# Patient Record
Sex: MALE | Race: BLACK OR AFRICAN AMERICAN | NOT HISPANIC OR LATINO | Employment: FULL TIME | URBAN - METROPOLITAN AREA
[De-identification: names, ages, dates, MRNs, and addresses within clinical notes are randomized per-mention and may not be internally consistent; named-entity substitution may affect disease eponyms.]

---

## 2017-01-25 ENCOUNTER — ALLSCRIPTS OFFICE VISIT (OUTPATIENT)
Dept: OTHER | Facility: OTHER | Age: 42
End: 2017-01-25

## 2017-04-05 ENCOUNTER — ALLSCRIPTS OFFICE VISIT (OUTPATIENT)
Dept: OTHER | Facility: OTHER | Age: 42
End: 2017-04-05

## 2017-04-05 DIAGNOSIS — Z13.6 ENCOUNTER FOR SCREENING FOR CARDIOVASCULAR DISORDERS: ICD-10-CM

## 2017-04-05 DIAGNOSIS — Z00.00 ENCOUNTER FOR GENERAL ADULT MEDICAL EXAMINATION WITHOUT ABNORMAL FINDINGS: ICD-10-CM

## 2017-04-08 LAB
A/G RATIO (HISTORICAL): 1.7 (CALC) (ref 1–2.5)
ALBUMIN SERPL BCP-MCNC: 4 G/DL (ref 3.6–5.1)
ALP SERPL-CCNC: 58 U/L (ref 40–115)
ALT SERPL W P-5'-P-CCNC: 40 U/L (ref 9–46)
AST SERPL W P-5'-P-CCNC: 27 U/L (ref 10–40)
BILIRUB SERPL-MCNC: 0.5 MG/DL (ref 0.2–1.2)
BUN SERPL-MCNC: 9 MG/DL (ref 7–25)
BUN/CREA RATIO (HISTORICAL): NORMAL (CALC) (ref 6–22)
CALCIUM SERPL-MCNC: 8.9 MG/DL (ref 8.6–10.3)
CHLORIDE SERPL-SCNC: 104 MMOL/L (ref 98–110)
CHOLEST SERPL-MCNC: 121 MG/DL (ref 125–200)
CHOLEST/HDLC SERPL: 3 (CALC)
CO2 SERPL-SCNC: 31 MMOL/L (ref 20–31)
CREAT SERPL-MCNC: 0.97 MG/DL (ref 0.6–1.35)
EGFR AFRICAN AMERICAN (HISTORICAL): 113 ML/MIN/1.73M2
EGFR-AMERICAN CALC (HISTORICAL): 98 ML/MIN/1.73M2
GAMMA GLOBULIN (HISTORICAL): 2.4 G/DL (CALC) (ref 1.9–3.7)
GLUCOSE (HISTORICAL): 94 MG/DL (ref 65–99)
HDLC SERPL-MCNC: 40 MG/DL
LDL CHOLESTEROL (HISTORICAL): 62 MG/DL (CALC)
NON-HDL-CHOL (CHOL-HDL) (HISTORICAL): 81 MG/DL (CALC)
POTASSIUM SERPL-SCNC: 4 MMOL/L (ref 3.5–5.3)
SODIUM SERPL-SCNC: 140 MMOL/L (ref 135–146)
TOTAL PROTEIN (HISTORICAL): 6.4 G/DL (ref 6.1–8.1)
TRIGL SERPL-MCNC: 95 MG/DL

## 2017-04-28 ENCOUNTER — ALLSCRIPTS OFFICE VISIT (OUTPATIENT)
Dept: OTHER | Facility: OTHER | Age: 42
End: 2017-04-28

## 2017-05-07 ENCOUNTER — GENERIC CONVERSION - ENCOUNTER (OUTPATIENT)
Dept: OTHER | Facility: OTHER | Age: 42
End: 2017-05-07

## 2017-05-16 LAB
DEPRECATED RDW RBC AUTO: 13.6 % (ref 11–15)
HCT VFR BLD AUTO: 45.9 % (ref 38.5–50)
HGB BLD-MCNC: 15.2 G/DL (ref 13.2–17.1)
MCH RBC QN AUTO: 28.4 PG (ref 27–33)
MCHC RBC AUTO-ENTMCNC: 33.2 G/DL (ref 32–36)
MCV RBC AUTO: 85.5 FL (ref 80–100)
PLATELET # BLD AUTO: 158 THOUSAND/UL (ref 140–400)
PMV BLD AUTO: 10.4 FL (ref 7.5–12.5)
RBC # BLD AUTO: 5.37 MILLION/UL (ref 4.2–5.8)
WBC # BLD AUTO: 3.4 THOUSAND/UL (ref 3.8–10.8)

## 2017-10-20 ENCOUNTER — ALLSCRIPTS OFFICE VISIT (OUTPATIENT)
Dept: OTHER | Facility: OTHER | Age: 42
End: 2017-10-20

## 2017-10-20 ENCOUNTER — GENERIC CONVERSION - ENCOUNTER (OUTPATIENT)
Dept: OTHER | Facility: OTHER | Age: 42
End: 2017-10-20

## 2017-10-20 ENCOUNTER — APPOINTMENT (OUTPATIENT)
Dept: LAB | Facility: HOSPITAL | Age: 42
End: 2017-10-20
Payer: COMMERCIAL

## 2017-10-20 ENCOUNTER — TRANSCRIBE ORDERS (OUTPATIENT)
Dept: ADMINISTRATIVE | Facility: HOSPITAL | Age: 42
End: 2017-10-20

## 2017-10-20 DIAGNOSIS — R39.14 FEELING OF INCOMPLETE BLADDER EMPTYING: Primary | ICD-10-CM

## 2017-10-20 DIAGNOSIS — R39.14 FEELING OF INCOMPLETE BLADDER EMPTYING: ICD-10-CM

## 2017-10-20 LAB
BILIRUB UR QL STRIP: 1
CLARITY UR: NORMAL
COLOR UR: YELLOW
GLUCOSE (HISTORICAL): NORMAL
HGB UR QL STRIP.AUTO: NORMAL
KETONES UR STRIP-MCNC: NORMAL MG/DL
LEUKOCYTE ESTERASE UR QL STRIP: 70
NITRITE UR QL STRIP: NORMAL
PH UR STRIP.AUTO: 5 [PH]
PROT UR STRIP-MCNC: 2000 MG/DL
SP GR UR STRIP.AUTO: 1.02
UROBILINOGEN UR QL STRIP.AUTO: 0.2

## 2017-10-20 PROCEDURE — 87086 URINE CULTURE/COLONY COUNT: CPT

## 2017-10-21 LAB — BACTERIA UR CULT: NORMAL

## 2017-10-21 NOTE — PROGRESS NOTES
Assessment  1  Feeling of incomplete bladder emptying (668 21) (R39 14)    Plan  Dysuria    · Urine Dip Non-Automated- POC; Status:Resulted - Requires Verification,Retrospective  By Protocol Authorization;   Done: 25GKU3684 09:50AM  Feeling of incomplete bladder emptying    · (1) URINE CULTURE; Source:Urine, Clean Catch; Status:Active; Requested  INL:[id number];    · US KIDNEY AND BLADDER WITH PVR; Status:Active; Requested PXX:[id number];   Flu vaccine need    · Fluzone Quadrivalent Intramuscular Suspension    Discussion/Summary    Incomplete bladder emptying- we will do bladder ultrasound  Urine sent for culture due to positive leukocytes  I will wait until culture results to prescribe any antibiotics  better family History on prostate cancer  The treatment plan was reviewed with the patient/guardian  The patient/guardian understands and agrees with the treatment plan      Chief Complaint  Pt here to discuss prostate exam       History of Present Illness  patient states he been having issues with bladder emptying  Has been feeling like volumes are decreased  This has been going on for a few months  Has some dribbling but no urgency, has decreased stream as it has been prior  Denies any burning or irritation  No blood has been present in urine  No ED concerns or libido concerns  Denies any immediate family history of prostate cancer  does do protein supplements up to twice a day for working out  also competed hep b series at work  Review of Systems    Constitutional: No fever or chills, feels well, no tiredness, no recent weight gain or weight loss  Cardiovascular: no chest pain  Respiratory: no shortness of breath  Gastrointestinal: no abdominal pain  Genitourinary: as noted in HPI  Musculoskeletal: no myalgias  ROS reviewed  Active Problems  1  Acquired trigger finger (727 03) (M65 30)   2  Bilateral impacted cerumen (380 4) (H61 23)   3   Encounter for screening for cardiovascular disorders (V81 2) (Z13 6)   4  Flu vaccine need (V04 81) (Z23)   5  Low back pain (724 2) (M54 5)   6  Mild persistent asthma without complication (551 23) (X89 27)   7  Need for Tdap vaccination (V06 1) (Z23)   8  Pain in finger of both hands (729 5) (M79 645,M79 644)   9  Snoring (786 09) (R06 83)   10  Suppurative otitis media of left ear, unspecified chronicity (382 4) (H66 42)    Past Medical History  1  Denied: History of alcohol abuse   2  Denied: History of mental disorder   3  Denied: History of substance abuse    The active problems and past medical history were reviewed and updated today  Surgical History  1  History of Rotator Cuff Repair    The surgical history was reviewed and updated today  Family History  Father    1  Family history of Diabetes (250 00) (E11 9)  Grandmother    2  Family history of Diabetes (250 00) (E11 9)  Family History    3  Denied: Family history of alcohol abuse   4  Denied: Family history of mental disorder   5  Denied: Family history of substance abuse    The family history was reviewed and updated today  Social History   · Active advance directive (V49 89) (Z78 9)   · Always uses seat belt   · Mandaeism   · Employed   · Has no children   · House   · Lives in private house   · Lives with domestic partner   · Never a smoker   · No advance directives (V49 89) (Z78 9)   · No alcohol use   · No history of alcohol use (Z78 9)   · No living will   · Primary language is English   · Single   · Supportive and safe   · [employer]  The social history was reviewed and updated today  Current Meds   1  Albuterol Sulfate (2 5 MG/3ML) 0 083% Inhalation Nebulization Solution; USE 1 VIAL IN   NEBULIZER EVERY 4 HOURS AS NEEDED; Therapy: 15Apr2016 to (Rehannohelia Pereira)  Requested for: 05Apr2017; Last   Rx:05Apr2017 Ordered   2  Cyclobenzaprine HCl - 10 MG Oral Tablet; TAKE 1 TABLET AT BEDTIME AS NEEDED;    Therapy: 27Apr2015 to (Evaluate:07Jxe9706)  Requested for: 05UWY3999; Last   Rx:28Apr2017 Ordered   3  Dulera 100-5 MCG/ACT Inhalation Aerosol; INHALE 2 PUFFS AT 12 HOUR INTERVALS   (MORNING AND EVENING); Therapy: 25ITN2615 to (Evaluate:39Xry6229)  Requested for: 05Apr2017; Last   Rx:05Apr2017 Ordered   4  Proventil  (90 Base) MCG/ACT Inhalation Aerosol Solution; INHALE 2 PUFFS   EVERY 4-6 HOURS, SPACED 60 SECONDS APART; Therapy: 91Wcl1868 to [phone])  Requested for: 320 2035; Last   Rx:05Apr2017 Ordered    The medication list was reviewed and updated today  Allergies  1  No Known Drug Allergies    Vitals  Vital Signs    Recorded: 37MKU0531 09:11AM   Temperature 97 7 F, Temporal   Heart Rate 66   Respiration 16   Systolic 627   Diastolic 510   Height 5 ft 2 in   Weight 152 lb 8 oz   BMI Calculated 27 89   BSA Calculated 1 7     Physical Exam    Constitutional   General appearance: No acute distress, well appearing and well nourished  Pulmonary   Respiratory effort: No increased work of breathing or signs of respiratory distress  Auscultation of lungs: Clear to auscultation, equal breath sounds bilaterally, no wheezes, no rales, no rhonci  Cardiovascular   Auscultation of heart: Normal rate and rhythm, normal S1 and S2, without murmurs  Abdomen   Abdomen: Non-tender, no masses    no CVA tenderness   Psychiatric   Orientation to person, place and time: Normal     Mood and affect: Normal          Results/Data  Urine Dip Non-Automated- POC 20Oct2017 09:50AM Noman Sagastume     Test Name Result Flag Reference   Color Yellow     Clarity Transparent     Leukocytes 70     Nitrite neg     Blood neg     Bilirubin 1     Urobilinogen 0 2     Protein 2000     Ph 5     Specific Gravity 1 025     Ketone ++++     Glucose neg     Color Yellow     Clarity Transparent     Leukocytes 70     Nitrite neg     Blood neg     Bilirubin 1     Urobilinogen 0 2     Protein 2000     Ph 5     Specific Gravity 1 025     Ketone ++++     Glucose neg Signatures   Electronically signed by : Melody Espino; Oct 20 2017 10:02AM EST                       (Author)    Electronically signed by :  Kirill Maria MD; Oct 20 2017 10:07AM EST                       (Author)

## 2017-10-23 ENCOUNTER — HOSPITAL ENCOUNTER (OUTPATIENT)
Dept: RADIOLOGY | Facility: HOSPITAL | Age: 42
Discharge: HOME | End: 2017-10-23
Payer: COMMERCIAL

## 2017-10-23 ENCOUNTER — GENERIC CONVERSION - ENCOUNTER (OUTPATIENT)
Dept: OTHER | Facility: OTHER | Age: 42
End: 2017-10-23

## 2017-10-23 DIAGNOSIS — R39.14 FEELING OF INCOMPLETE BLADDER EMPTYING: ICD-10-CM

## 2017-10-23 PROCEDURE — 51798 US URINE CAPACITY MEASURE: CPT

## 2018-01-10 NOTE — RESULT NOTES
Verified Results  (1) COMPREHENSIVE METABOLIC PANEL 09DPB3654 59:93WN Marysol Patricior   REPORT COMMENT:  FASTING:YES     Test Name Result Flag Reference   GLUCOSE 94 mg/dL  65-99   Fasting reference interval   UREA NITROGEN (BUN) 9 mg/dL  7-25   CREATININE 0 97 mg/dL  0 60-1 35   eGFR NON-AFR  AMERICAN 98 mL/min/1 73m2  > OR = 60   eGFR AFRICAN AMERICAN 113 mL/min/1 73m2  > OR = 60   BUN/CREATININE RATIO   4-46   NOT APPLICABLE (calc)   SODIUM 140 mmol/L  135-146   POTASSIUM 4 0 mmol/L  3 5-5 3   CHLORIDE 104 mmol/L     CARBON DIOXIDE 31 mmol/L  20-31   CALCIUM 8 9 mg/dL  8 6-10 3   PROTEIN, TOTAL 6 4 g/dL  6 1-8 1   ALBUMIN 4 0 g/dL  3 6-5 1   GLOBULIN 2 4 g/dL (calc)  1 9-3 7   ALBUMIN/GLOBULIN RATIO 1 7 (calc)  1 0-2 5   BILIRUBIN, TOTAL 0 5 mg/dL  0 2-1 2   ALKALINE PHOSPHATASE 58 U/L     AST 27 U/L  10-40   ALT 40 U/L  9-46     (1) LIPID PANEL, FASTING 07Apr2017 08:12AM Marysol Patricior     Test Name Result Flag Reference   CHOLESTEROL, TOTAL 121 mg/dL L 125-200   HDL CHOLESTEROL 40 mg/dL  > OR = 40   TRIGLICERIDES 95 mg/dL  <184   LDL-CHOLESTEROL 62 mg/dL (calc)  <130   Desirable range <100 mg/dL for patients with CHD or  diabetes and <70 mg/dL for diabetic patients with  known heart disease  CHOL/HDLC RATIO 3 0 (calc)  < OR = 5 0   NON HDL CHOLESTEROL 81 mg/dL (calc)     Target for non-HDL cholesterol is 30 mg/dL higher than   LDL cholesterol target

## 2018-01-12 VITALS
SYSTOLIC BLOOD PRESSURE: 140 MMHG | WEIGHT: 152.5 LBS | HEIGHT: 62 IN | HEART RATE: 66 BPM | BODY MASS INDEX: 28.06 KG/M2 | DIASTOLIC BLOOD PRESSURE: 102 MMHG | TEMPERATURE: 97.7 F | RESPIRATION RATE: 16 BRPM

## 2018-01-12 NOTE — RESULT NOTES
Verified Results  (1) URINE CULTURE 12QSD0114 05:52PM Luis Eduardo Cesar Order Number: [id number]     Test Name Result Flag Reference   CLINICAL REPORT (Report)     Test:        Urine culture  Specimen Type:   Urine  Specimen Date:   10/20/2017 5:52 PM  Result Date:    10/21/2017 3:30 PM  Result Status:   Final result  Resulting Lab:   [place] [address]            Tel: [phone]      CULTURE                                       ------------------                                   No Growth <1000 cfu/mL       Signatures   Electronically signed by : Alena Michael; Oct 23 2017 11:24AM EST                       (Author)

## 2018-01-13 VITALS
BODY MASS INDEX: 28.52 KG/M2 | TEMPERATURE: 99.7 F | DIASTOLIC BLOOD PRESSURE: 96 MMHG | HEART RATE: 80 BPM | WEIGHT: 155 LBS | SYSTOLIC BLOOD PRESSURE: 122 MMHG | RESPIRATION RATE: 16 BRPM | HEIGHT: 62 IN

## 2018-01-13 VITALS
HEIGHT: 62 IN | BODY MASS INDEX: 28.52 KG/M2 | SYSTOLIC BLOOD PRESSURE: 120 MMHG | WEIGHT: 155 LBS | DIASTOLIC BLOOD PRESSURE: 82 MMHG | HEART RATE: 78 BPM | TEMPERATURE: 99.3 F

## 2018-01-15 NOTE — PROGRESS NOTES
Assessment    1  Encounter for preventive health examination (V70 0) (Z00 00)    Plan  Encounter for screening for cardiovascular disorders    · (1) LIPID PANEL, FASTING; Status:Resulted - Requires Verification;   Done: 09Tga9739  08:12AM  Health Maintenance    · (1) COMPREHENSIVE METABOLIC PANEL; Status:Resulted - Requires Verification;    Done: 69ACV5143 08:12AM  Mild persistent asthma without complication    · Albuterol Sulfate (2 5 MG/3ML) 0 083% Inhalation Nebulization Solution; USE 1  VIAL IN NEBULIZER EVERY 4 HOURS AS NEEDED   · Dulera 100-5 MCG/ACT Inhalation Aerosol; INHALE 2 PUFFS AT 12 HOUR  INTERVALS (MORNING AND EVENING)   · Proventil  (90 Base) MCG/ACT Inhalation Aerosol Solution; INHALE 2  PUFFS EVERY 4-6 HOURS, SPACED 60 SECONDS APART    Discussion/Summary  Impression: health maintenance visit  Currently, he eats a healthy diet and has an adequate exercise regimen  Prostate cancer screening: PSA is not indicated  Colorectal cancer screening: colorectal cancer screening is not indicated  Patient discussion: discussed with the patient  Chief Complaint  yearly work physical      History of Present Illness  , Adult Male: The patient is being seen for a health maintenance evaluation  General Health: The patient's health since the last visit is described as good  He has regular dental visits  He denies vision problems  He denies hearing loss  Immunizations status: not up to date  Lifestyle:  He consumes a diverse and healthy diet  He does not have any weight concerns  He exercises regularly  He does not use tobacco  He denies alcohol use  Screening: cancer screening reviewed and updated  metabolic screening reviewed and updated  risk screening reviewed and updated  Review of Systems    Constitutional: No fever or chills, feels well, no tiredness, no recent weight gain or weight loss  Eyes: No complaints of eye pain, no red eyes, no discharge from eyes, no itchy eyes     ENT: no complaints of earache, no hearing loss, no nosebleeds, no nasal discharge, no sore throat, no hoarseness  Cardiovascular: No complaints of slow heart rate, no fast heart rate, no chest pain, no palpitations, no leg claudication, no lower extremity  Respiratory: No complaints of shortness of breath, no wheezing, no cough, no SOB on exertion, no orthopnea or PND  Gastrointestinal: No complaints of abdominal pain, no constipation, no nausea or vomiting, no diarrhea or bloody stools  Genitourinary: No complaints of dysuria, no incontinence, no hesitancy, no nocturia, no genital lesion, no testicular pain  Musculoskeletal: No complaints of arthralgia, no myalgias, no joint swelling or stiffness, no limb pain or swelling  Integumentary: No complaints of skin rash or skin lesions, no itching, no skin wound, no dry skin  Neurological: No compliants of headache, no confusion, no convulsions, no numbness or tingling, no dizziness or fainting, no limb weakness, no difficulty walking  Psychiatric: Is not suicidal, no sleep disturbances, no anxiety or depression, no change in personality, no emotional problems  Endocrine: No complaints of proptosis, no hot flashes, no muscle weakness, no erectile dysfunction, no deepening of the voice, no feelings of weakness  Hematologic/Lymphatic: No complaints of swollen glands, no swollen glands in the neck, does not bleed easily, no easy bruising  Active Problems    1  Acquired trigger finger (727 03) (M65 30)   2  Bilateral impacted cerumen (380 4) (H61 23)   3  Flu vaccine need (V04 81) (Z23)   4  Low back pain (724 2) (M54 5)   5  Mild persistent asthma without complication (785 36) (P35 43)   6  Need for Tdap vaccination (V06 1) (Z23)   7  Pain in finger of both hands (729 5) (M79 645,M79 644)   8  Snoring (786 09) (R06 83)   9   Suppurative otitis media of left ear, unspecified chronicity (382 4) (H66 42)    Past Medical History    · Denied: History of alcohol abuse   · Denied: History of mental disorder   · Denied: History of substance abuse    Surgical History    · History of Rotator Cuff Repair    Family History  Father    · Family history of Diabetes (250 00) (E11 9)  Grandmother    · Family history of Diabetes (250 00) (E11 9)    Social History    · Never a smoker    Current Meds   1  Albuterol Sulfate (2 5 MG/3ML) 0 083% Inhalation Nebulization Solution; USE 1 VIAL IN   NEBULIZER EVERY 4 HOURS AS NEEDED; Therapy: 57Voo3793 to (Evaluate:53Acm5305)  Requested for: 36Puo1982; Last   Rx:93Qsi2817 Ordered   2  Dulera 100-5 MCG/ACT Inhalation Aerosol; INHALE 2 PUFFS AT 12 HOUR INTERVALS   (MORNING AND EVENING); Therapy: 74MIV9556 to (Evaluate:04Jun2016)  Requested for: 33YJN8544; Last   Rx:29Pzh0772 Ordered   3  Proventil  (90 Base) MCG/ACT Inhalation Aerosol Solution; INHALE 2 PUFFS   EVERY 4-6 HOURS, SPACED 60 SECONDS APART; Therapy: 38Wig5527 to (Evaluate:93Fer3593)  Requested for: 97DLY8898; Last   Rx:37Oua2211 Ordered    Allergies    1  No Known Drug Allergies    Vitals   Recorded: 79LZC1534 11:22AM   Heart Rate 76   Systolic 780   Diastolic 76   Height 5 ft 2 in   Weight 153 lb    BMI Calculated 27 98   BSA Calculated 1 71     Physical Exam    Constitutional   General appearance: No acute distress, well appearing and well nourished  Eyes   Pupils and irises: Equal, round, reactive to light  Ears, Nose, Mouth, and Throat   Otoscopic examination: Tympanic membranes translucent with normal light reflex  Canals patent without erythema  Oropharynx: Normal with no erythema, edema, exudate or lesions  Neck   Thyroid: Normal, no thyromegaly  Pulmonary   Auscultation of lungs: Clear to auscultation  Cardiovascular   Auscultation of heart: Normal rate and rhythm, normal S1 and S2, no murmurs  Pedal pulses: 2+ bilaterally  Examination of extremities for edema and/or varicosities: Normal     Abdomen   Abdomen: Non-tender, no masses  Lymphatic   Palpation of lymph nodes in neck: No lymphadenopathy  Musculoskeletal   Gait and station: Normal     Inspection/palpation of joints, bones, and muscles: Normal     Skin   Skin and subcutaneous tissue: Normal without rashes or lesions  Neurologic   Cranial nerves: Cranial nerves 2-12 intact  Results/Data  (1) COMPREHENSIVE METABOLIC PANEL 25XIG4985 03:92GK Carmelo Up   REPORT COMMENT:  FASTING:YES     Test Name Result Flag Reference   GLUCOSE 94 mg/dL  65-99   Fasting reference interval   UREA NITROGEN (BUN) 9 mg/dL  7-25   CREATININE 0 97 mg/dL  0 60-1 35   eGFR NON-AFR  AMERICAN 98 mL/min/1 73m2  > OR = 60   eGFR AFRICAN AMERICAN 113 mL/min/1 73m2  > OR = 60   BUN/CREATININE RATIO   4-62   NOT APPLICABLE (calc)   SODIUM 140 mmol/L  135-146   POTASSIUM 4 0 mmol/L  3 5-5 3   CHLORIDE 104 mmol/L     CARBON DIOXIDE 31 mmol/L  20-31   CALCIUM 8 9 mg/dL  8 6-10 3   PROTEIN, TOTAL 6 4 g/dL  6 1-8 1   ALBUMIN 4 0 g/dL  3 6-5 1   GLOBULIN 2 4 g/dL (calc)  1 9-3 7   ALBUMIN/GLOBULIN RATIO 1 7 (calc)  1 0-2 5   BILIRUBIN, TOTAL 0 5 mg/dL  0 2-1 2   ALKALINE PHOSPHATASE 58 U/L     AST 27 U/L  10-40   ALT 40 U/L  9-46     PHQ-9 Adult Depression Screening 05Apr2017 11:25AM User, [facility]     Test Name Result Flag Reference   PHQ-9 Adult Depression Score 0     Over the last two weeks, how often have you been bothered by any of the following problems?   Little interest or pleasure in doing things: Not at all - 0  Feeling down, depressed, or hopeless: Not at all - 0  Trouble falling or staying asleep, or sleeping too much: Not at all - 0  Feeling tired or having little energy: Not at all - 0  Poor appetite or over eating: Not at all - 0  Feeling bad about yourself - or that you are a failure or have let yourself or your family down: Not at all - 0  Trouble concentrating on things, such as reading the newspaper or watching television: Not at all - 0  Moving or speaking so slowly that other people could have noticed  Or the opposite -  being so fidgety or restless that you have been moving around a lot more than usual: Not at all - 0  Thoughts that you would be better off dead, or of hurting yourself in some way: Not at all - 0   PHQ-9 Adult Depression Screening Negative     PHQ-9 Difficulty Level Not difficult at all     PHQ-9 Severity No Depression         Signatures   Electronically signed by : Winsome Simmons DO;  Apr 17 2017  8:30PM EST                       (Author)

## 2018-01-16 ENCOUNTER — ALLSCRIPTS OFFICE VISIT (OUTPATIENT)
Dept: OTHER | Facility: OTHER | Age: 43
End: 2018-01-16

## 2018-01-16 DIAGNOSIS — R80.9 PROTEINURIA: ICD-10-CM

## 2018-01-16 NOTE — RESULT NOTES
Verified Results  US KIDNEY AND BLADDER WITH PVR 23Oct2017 02:31PM Latoya Chun     Test Name Result Flag Reference   US KIDNEY AND BLADDER WITH PVR (Report)     RENAL ULTRASOUND     INDICATION: Proteinuria     COMPARISON: None  TECHNIQUE:  Ultrasound of the retroperitoneum was performed with a curvilinear transducer utilizing volumetric sweeps and still imaging techniques  FINDINGS:     KIDNEYS:   Symmetric and normal size  Right kidney: 9 7 x 6 5 cm  Normal echogenicity and contour  No suspicious masses detected  No hydronephrosis  No shadowing calculi  No perinephric fluid collections  Left kidney: 10 7 x 6 4 cm  Normal echogenicity and contour  No suspicious masses detected  No hydronephrosis  No shadowing calculi  No perinephric fluid collections  URETERS:   Nonvisualized  BLADDER:    Normally distended  No focal thickening or mass lesions  Bilateral ureteral jets detected  No significant postvoid residual urine volume         IMPRESSION:     Normal         Workstation performed: [id number]     Signed by:   Flores Richard MD   10/26/17       Signatures   Electronically signed by : Carlos Draper; Oct 30 2017 11:44AM EST                       (Author)

## 2018-01-16 NOTE — PROGRESS NOTES
Assessment   1  Mild persistent asthma without complication (292 21) (W64 72)  2  Pain in finger of both hands (729 5) (M79 645,M79 644)  3  Snoring (786 09) (R06 83)  4  Bilateral impacted cerumen (380 4) (H61 23)1      1 Amended By: Darcie Pitts; Feb 05 2016 8:38 AM EST    Plan  Bilateral impacted cerumen    · 1,2   Health Maintenance, Need for Tdap vaccination    · Administer: Adacel 5-2-15 5 LF-MCG/0 5 Intramuscular Suspension; INJECT 0 5  ML  Intramuscular; To Be Done: 10BWO12064   Low back pain    · Renew: Cyclobenzaprine HCl - 10 MG Oral Tablet; TAKE 1 TABLET AT BEDTIME AS  NEEDED1   Mild persistent asthma without complication    · Renew: Dulera 100-5 MCG/ACT Inhalation Aerosol; INHALE 2 PUFFS AT 12 HOUR  INTERVALS (MORNING AND EVENING)1    · Renew: Proventil  (90 Base) MCG/ACT Inhalation Aerosol Solution; INHALE 2  PUFFS EVERY 4-6 HOURS, SPACED 60 SECONDS APART1   Mild persistent asthma without complication, Pain in finger of both hands    · Start: PredniSONE 10 MG Oral Tablet; 4/d for  3days, 3/d for 3 days, 2/d for 3 days, 1/d for  3 days  Pain in finger of both hands    · *1 -  [facility] within [facility] (ORTHOPEDIC SURGERY ) Physician  Referral  Consult  Status: Hold For - Scheduling  Requested for:  65IHK6393  () Care Summary provided  : Yes  Snoring    · *1 - [address] Physician Referral  Consult  Status: Hold For - Scheduling   Requested for: 83FDE6609  () Care Summary provided  : Yes     1 Amended By: Darcie Pitts; Feb 05 2016 8:38 AM EST   2 Amended By: Darcie Pitts; Feb 05 2016 10:15 AM EST    Discussion/Summary    See how hands and congestion do w/prednisone taper, sleep study if no better, ortho eval if no better  Chief Complaint   1  Finger Problem  1ST FINGER, B/L HAND IS CLICKING  LEFT SIDE IS WORSE   HAS GOTTEN PROGRESSIVELY WORSE overworked out with exercise routine, iced and took motrin  SNORING IS GETTING WORSE AND NOTICED LATELY THAT WHEN HE SNEEZES, NO AIR COMES OUT OF HIS NOSE  ALSO NOTICED THAT HIS CHEST IS TIGHT IN THE MORNING AND FEELS CONGESTED  History of Present Illness  Finger Problem:   Gates Harada presents with complaints of sudden onset of frequent episodes of metacarpophalangeal joint and bilateral first digit finger problem  The symptoms resulted from a traction injury  The injury occurred at home  Symptoms are improved by ice, but not by analgesics  Symptoms are unchanged  Associated symptoms include finger stiffness  Review of Systems    Constitutional: no fever, not feeling poorly and no chills  ENT: nasal blockage sx, but no earache and no nasal discharge  Respiratory: shortness of breath, wheezing and girlfriend says snoring worse  Musculoskeletal: limb pain and finger pain  Active Problems   1  Low back pain (724 2) (M54 5)    Past Medical History  Active Problems And Past Medical History Reviewed: The active problems and past medical history were reviewed and updated today  Family History   1  Family history of Diabetes (250 00) (E11 9)   2  Family history of Diabetes (250 00) (E11 9)  Family History Reviewed: The family history was reviewed and updated today  Social History    · Never a smoker  The social history was reviewed and updated today  Surgical History   1  History of Rotator Cuff Repair  Surgical History Reviewed: The surgical history was reviewed and updated today  Current Meds  1  Cyclobenzaprine HCl - 10 MG Oral Tablet; TAKE 1 TABLET AT BEDTIME AS NEEDED; Therapy: 92EXS1497 to (Evaluate:27May2015)  Requested for: ; Last   Rx:27Apr2015 Ordered  2  Dulera 100-5 MCG/ACT Inhalation Aerosol; INHALE 2 PUFFS AT 12 HOUR INTERVALS   (MORNING AND EVENING); Therapy: 66WAN7366 to (Evaluate:02Jan2016)  Requested for: 07TPD7987; Last   EF:83JMI2706; Status: ACTIVE - Renewal Denied Ordered  3   Proventil  (90 Base) MCG/ACT Inhalation Aerosol Solution; INHALE 1 TO 2   PUFFS EVERY 4 TO 6 HOURS AS NEEDED; Therapy: 68Qxn0822 to (Last Rx:58Oma4514) Ordered    The medication list was reviewed and updated today  Allergies   1  No Known Drug Allergies    Vitals   Recorded: 85TBG8773 08:07AM   Temperature 98 7 F   Heart Rate 67   Systolic 98   Diastolic 66   Height 5 ft 2 in   Weight 150 lb    BMI Calculated 27 44   BSA Calculated 1 69     Physical Exam    Constitutional   General appearance: Abnormal   appears healthy and overweight  Ears, Nose, Mouth, and Throat   Otoscopic examination: Abnormal   The right tympanic membrane was normal  The left tympanic membrane was normal  The right external canal had a cerumen impaction  The left external canal had a cerumen impaction  Nasal mucosa, septum, and turbinates: Abnormal   The bilateral nasal mucosa was boggy and edematous  Oropharynx: Normal with no erythema, edema, exudate or lesions  Pulmonary   Auscultation of lungs: Abnormal   wheezing over both bases  Lymphatic   Palpation of lymph nodes in neck: No lymphadenopathy  Musculoskeletal   Inspection/palpation of joints, bones, and muscles: Abnormal   Appearance - bilateral finger swelling  Palpation - bilateral finger tenderness  Results/Data  PHQ-2 Adult Depression Screening 08ZKH8843 08:11AM User, [facility]     Test Name Result Flag Reference   PHQ-2 Adult Depression Score 0     Q1: 0, Q2: 0   PHQ-2 Adult Depression Screening Negative         Signatures   Electronically signed by :  Tristan Diamond MD; Feb 5 2016 10:15AM EST                       (Author)

## 2018-01-17 NOTE — PROGRESS NOTES
Assessment   1  Bunion, right foot (727 1) (M21 611)   2  Protein in urine (791 0) (R80 9)   3  Snoring (786 09) (R06 83)   4  Deviated nasal septum (470) (J34 2)    Plan   Bunion, right foot    · Wear shoes that give your toes plenty of room ; Status:Complete;   Done: 48HBA1284  Deviated nasal septum, Snoring    · 2 - [facility] (OTOLARYNGOLOGY) Co-Management  *  Status: Hold For - Scheduling     Requested for: 64FZK8480  Care Summary provided  : Yes  Protein in urine    · (1) PROTEIN, URINE 24HR; Status:Active; Requested ARJ:[id number]; Discussion/Summary      Abnormal protein in October during urinalysis  Recommend doing 24 hour urine  Continue diet changes to reduce high protein consumption  foot bunion- trial inserts and cushions for pain relief  if not improved requires podiatry referral   septum; referral to ENT given to evaluated for correction of septum   Possible side effects of new medications were reviewed with the patient/guardian today  The treatment plan was reviewed with the patient/guardian  The patient/guardian understands and agrees with the treatment plan      Chief Complaint   Patient present today for a follow up on foot pain and to discuss test results from October  patient he will like to get Tests to be rechecked to make sure everything is going back top normal           History of Present Illness   The patient is being seen for an initial evaluation of foot pain  This condition is not related to a specific injury  This occurred 1 month(s) ago  Symptoms:  no localized swelling,-- no limping-- and-- no refusal to bear weight--       The patient presents with complaints of foot pain (Right foot pain  )  The patient presents with complaints of localized bruising (right foot pain  ) The patient is currently experiencing symptoms  Associated symptoms:  no fever,-- no pain in other joints,-- no back pain,-- no localized numbness-- and-- no localized weakness   Current treatment includes ice, heat, activity modification, stretching regimen, acetaminophen, ibuprofen and muscle relaxer  patient states previous evaluation with ENT states he has small sinuses  he wants to be revaluated for this  States he has sinus congestion  wants to discuss ongoing urinary issues  regarding his protein  he states he has stopped doing protein supplements  Review of Systems        Constitutional: as noted in HPI  Eyes: as noted in HPI       ENT: as noted in HPI  Cardiovascular: no chest pain  Musculoskeletal: as noted in HPI  ROS reviewed  Active Problems   1  Acquired trigger finger (727 03) (M65 30)   2  Bilateral impacted cerumen (380 4) (H61 23)   3  Dysuria (788 1) (R30 0)   4  Encounter for screening for cardiovascular disorders (V81 2) (Z13 6)   5  Feeling of incomplete bladder emptying (788 21) (R39 14)   6  Flu vaccine need (V04 81) (Z23)   7  Low back pain (724 2) (M54 5)   8  Mild persistent asthma without complication (916 24) (A75 03)   9  Need for Tdap vaccination (V06 1) (Z23)   10  Pain in finger of both hands (729 5) (M79 645,M79 644)   11  Snoring (786 09) (R06 83)   12  Suppurative otitis media of left ear, unspecified chronicity (382 4) (H66 42)    Past Medical History   1  Denied: History of alcohol abuse   2  Denied: History of mental disorder   3  Denied: History of substance abuse     The active problems and past medical history were reviewed and updated today  Surgical History   1  History of Rotator Cuff Repair     The surgical history was reviewed and updated today  Family History   Father    1  Family history of Diabetes (250 00) (E11 9)  Grandmother    2  Family history of Diabetes (250 00) (E11 9)  Family History    3  Denied: Family history of alcohol abuse   4  Denied: Family history of mental disorder   5  Denied: Family history of substance abuse     The family history was reviewed and updated today         Social History · Active advance directive (V49 89) (Z78 9)   · Always uses seat belt   · Nondenominational   · Employed   · Has no children   · House   · Lives in private house   · Lives with domestic partner   · Never a smoker   · No advance directives (V49 89) (Z78 9)   · No alcohol use   · No history of alcohol use (Z78 9)   · No living will   · Primary language is English   · Single   · Supportive and safe   · [employer]  The social history was reviewed and is unchanged  Current Meds    1  Albuterol Sulfate (2 5 MG/3ML) 0 083% Inhalation Nebulization Solution; USE 1 VIAL IN     NEBULIZER EVERY 4 HOURS AS NEEDED; Therapy: 67Zac3925 to ([facility] Fanny)  Requested for: 05Apr2017; Last     Rx:[id number] Ordered   2  Cyclobenzaprine HCl - 10 MG Oral Tablet; TAKE 1 TABLET AT BEDTIME AS NEEDED; Therapy: 86Agp8781 to ([place])  Requested for: 28Apr2017; Last     Rx:[id number] Ordered   3  Dulera 100-5 MCG/ACT Inhalation Aerosol; INHALE 2 PUFFS AT 12 HOUR INTERVALS     (MORNING AND EVENING); Therapy: 76FIA1096 to (Evaluate:66Eqt8824)  Requested for: 05Apr2017; Last     Rx:05Apr2017 Ordered   4  Proventil  (90 Base) MCG/ACT Inhalation Aerosol Solution; INHALE 2 PUFFS     EVERY 4-6 HOURS, SPACED 60 SECONDS APART; Therapy: 65Cbm3490 to [phone])  Requested for: 320 2035; Last     Rx:[id number] Ordered     The medication list was reviewed and updated today  Allergies   1  No Known Drug Allergies    Vitals   Vital Signs    Recorded: 40OEL3447 09:40AM   Temperature 98 4 F, Temporal   Heart Rate 64   Respiration 16   Systolic 478   Diastolic 924   Height 5 ft 2 5 in   Weight 150 lb 8 oz   BMI Calculated 27 09   BSA Calculated 1 7     Physical Exam        Constitutional      General appearance: No acute distress, well appearing and well nourished         Ears, Nose, Mouth, and Throat      Nasal mucosa, septum, and turbinates: Abnormal   examination of the septum showed deviation to the left      Pulmonary      Respiratory effort: No increased work of breathing or signs of respiratory distress  Auscultation of lungs: Clear to auscultation, equal breath sounds bilaterally, no wheezes, no rales, no rhonci  Cardiovascular      Auscultation of heart: Normal rate and rhythm, normal S1 and S2, without murmurs  Musculoskeletal      Gait and station: Normal        Inspection/palpation of joints, bones, and muscles: Abnormal   Appearance - right foot ecchymosis  Palpation - right foot tenderness, but-- no right foot crepitus  Skin      Skin and subcutaneous tissue: Normal without rashes or lesions  Psychiatric      Orientation to person, place and time: Normal        Mood and affect: Normal           Results/Data   PHQ-9 Adult Depression Screening 87XQI6507 09:50AM User, [employer]      Test Name Result Flag Reference   PHQ-9 Adult Depression Score 0     Over the last two weeks, how often have you been bothered by any of the following problems? Little interest or pleasure in doing things: Not at all - 0     Feeling down, depressed, or hopeless: Not at all - 0     Trouble falling or staying asleep, or sleeping too much: Not at all - 0     Feeling tired or having little energy: Not at all - 0     Poor appetite or over eating: Not at all - 0     Feeling bad about yourself - or that you are a failure or have let yourself or your family down: Not at all - 0     Trouble concentrating on things, such as reading the newspaper or watching television: Not at all - 0     Moving or speaking so slowly that other people could have noticed   Or the opposite -  being so fidgety or restless that you have been moving around a lot more than usual: Not at all - 0     Thoughts that you would be better off dead, or of hurting yourself in some way: Not at all - 0   PHQ-9 Adult Depression Screening Negative     PHQ-9 Difficulty Level Not difficult at all     PHQ-9 Severity No Depression Signatures    Electronically signed by : Emerita Capone; Jan 16 2018 11:16AM EST                       (Author)     Electronically signed by :  Geno Black MD; Jan 16 2018 11:47AM EST                       (Author)

## 2018-01-18 ENCOUNTER — LAB CONVERSION - ENCOUNTER (OUTPATIENT)
Dept: OTHER | Facility: OTHER | Age: 43
End: 2018-01-18

## 2018-01-18 LAB
CREATININE 24 HOUR UR (HISTORICAL): 2.5 G/24 H (ref 0.63–2.5)
PROT/CREAT UR: 741 MG/G CREAT
PROTEIN 24 HOUR URINE (HISTORICAL): 1853 MG/24 H

## 2018-01-22 VITALS
SYSTOLIC BLOOD PRESSURE: 138 MMHG | RESPIRATION RATE: 16 BRPM | DIASTOLIC BLOOD PRESSURE: 102 MMHG | HEIGHT: 63 IN | BODY MASS INDEX: 26.67 KG/M2 | WEIGHT: 150.5 LBS | TEMPERATURE: 98.4 F | HEART RATE: 64 BPM

## 2018-01-22 VITALS
DIASTOLIC BLOOD PRESSURE: 76 MMHG | HEIGHT: 62 IN | WEIGHT: 153 LBS | HEART RATE: 76 BPM | SYSTOLIC BLOOD PRESSURE: 124 MMHG | BODY MASS INDEX: 28.16 KG/M2

## 2018-01-24 ENCOUNTER — TRANSCRIBE ORDERS (OUTPATIENT)
Dept: ADMINISTRATIVE | Facility: HOSPITAL | Age: 43
End: 2018-01-24

## 2018-01-24 DIAGNOSIS — R80.9 PROTEINURIA, UNSPECIFIED TYPE: Primary | ICD-10-CM

## 2018-03-22 ENCOUNTER — OFFICE VISIT (OUTPATIENT)
Dept: NEPHROLOGY | Facility: CLINIC | Age: 43
End: 2018-03-22
Payer: COMMERCIAL

## 2018-03-22 VITALS — WEIGHT: 151 LBS | BODY MASS INDEX: 27.79 KG/M2 | HEIGHT: 62 IN

## 2018-03-22 DIAGNOSIS — R80.9 PROTEINURIA, UNSPECIFIED TYPE: ICD-10-CM

## 2018-03-22 LAB
SL AMB  POCT GLUCOSE, UA: ABNORMAL
SL AMB LEUKOCYTE ESTERASE,UA: ABNORMAL
SL AMB POCT BILIRUBIN,UA: ABNORMAL
SL AMB POCT BLOOD,UA: ABNORMAL
SL AMB POCT CLARITY,UA: CLEAR
SL AMB POCT COLOR,UA: YELLOW
SL AMB POCT KETONES,UA: ABNORMAL
SL AMB POCT NITRITE,UA: ABNORMAL
SL AMB POCT PH,UA: 7
SL AMB POCT SPECIFIC GRAVITY,UA: 1.01
SL AMB POCT URINE PROTEIN: ABNORMAL
SL AMB POCT UROBILINOGEN: 0.2

## 2018-03-22 PROCEDURE — 99244 OFF/OP CNSLTJ NEW/EST MOD 40: CPT | Performed by: INTERNAL MEDICINE

## 2018-03-22 PROCEDURE — 81002 URINALYSIS NONAUTO W/O SCOPE: CPT | Performed by: INTERNAL MEDICINE

## 2018-03-22 RX ORDER — LISINOPRIL 10 MG/1
10 TABLET ORAL DAILY
Qty: 30 TABLET | Refills: 5 | Status: SHIPPED | OUTPATIENT
Start: 2018-03-22 | End: 2018-09-18

## 2018-03-22 RX ORDER — ALBUTEROL SULFATE 90 UG/1
2 AEROSOL, METERED RESPIRATORY (INHALATION)
COMMUNITY
Start: 2015-04-27 | End: 2018-07-13 | Stop reason: SDUPTHER

## 2018-03-22 NOTE — PATIENT INSTRUCTIONS
You are here for incidentally discovered protein in the urine  The amount of protein is around 1 8 g which is elevated but as we discussed it sort of in a medium range  Your last creatinine which is the blood test for the kidney function was 0 8 which was normal     At this point I think we should begin lisinopril 10 mg a day to help reduce protein in the urine  I understand on of high blood pressure but this is a low dose and should in give you any side effects if it does please stop it and call me      After your on that medication for a couple weeks then go get the blood work and urine test that I have provided and I will contact her with those results

## 2018-03-22 NOTE — PROGRESS NOTES
Consultation - Nephrology   Bozena Lopez 38 y o  male MRN: [id number]  Unit/Bed#:  Encounter: [id number]      Assessment/Plan     Assessment / Plan:  1  Renal     The patient was incidentally discovered to have proteinuria when he went to his family physician was complaining of some urinary hesitancy  This was then repeated and confirmed to be present a few months later  He is here for evaluation of proteinuria and a nondiabetic  His last blood work showed a creatinine is 0 8 but that was close to 10 months ago and protein quantitation was around 1 8 g  This falls and the tubular interstitial range  Urinalysis did not have an active urine sediment is I did not see any red blood cells or red blood cell casts  At this point we need to initiate evaluation with repeat blood work and repeat urine quantitation after initiation of ACE-inhibitor  I am going to start lisinopril 10 mg a day then will repeat lab work to make sure creatinine is stable and remained normal and also the protein estimation  If we are able to reduce proteinuria with this conservative approach we can avoid a renal biopsy but if the proteinuria would increased significantly with a creatinine will begin to increase he would need a renal biopsy at that time  2   Blood pressure  The patient has never had a history of hypertension was on no medications he states he was just very anxious today at sweats a little elevated  I told him that even if his blood pressure is normal the low dose of this medication should not reduce it but if he has any symptoms of dizziness or low blood pressure please stop it and give me a call  History of Present Illness   Physician Requesting Consult: No att  providers found  Reason for Consult / Principal Problem:   Proteinuria  Hx and PE limited by:   HPI: Bozena Lopez is a 38y o  year old male who presents with proteinuria    The patient states he has been in good health any went for a physical in complained a little bit of urinary hesitancy in felt eaten empties bladder well  They underwent a urinalysis in the office and found protein in the urine  A period of time past the primary physician repeated this and also did a 24 hour urine revealed significant proteinuria he is here for evaluation  History obtained from chart review and patient  He does not take any over-the-counter nonsteroidals  Consults    Review of Systems   Constitutional: Negative  HENT: Negative  Eyes: Negative  Respiratory: Negative  Cardiovascular: Negative  Gastrointestinal: Negative  Endocrine: Negative  Genitourinary: Negative for difficulty urinating, dysuria and hematuria  Mild urinary hesitancy   Musculoskeletal: Negative  Negative for arthralgias  Neurological: Negative  Historical Information   Patient Active Problem List   Diagnosis    Proteinuria    Mild asthma  Past Medical History:   Diagnosis Date    Proteinuria     No history of diabetes, cancer, stroke, liver disease, lung disease, HIV, hepatitis, kidney stones  Past Surgical History:   Procedure Laterality Date    SHOULDER SURGERY       Social History  works as a nurse  No alcohol tobacco or drug abuse  History   Alcohol Use No     History   Drug Use No     History   Smoking Status    Never Smoker   Smokeless Tobacco    Never Used     Family History   Problem Relation Age of Onset    No Known Problems Mother     Diabetes Father        Meds/Allergies   current meds:   No current facility-administered medications for this visit  No Known Allergies    Objective   [unfilled]  Body mass index is 27 62 kg/m²  Invasive Devices:        PHYSICAL EXAM:  Ht 5' 2" (1 575 m)   Wt 68 5 kg (151 lb)   BMI 27 62 kg/m²     Physical Exam   Constitutional: He is oriented to person, place, and time  He appears well-nourished  No distress  HENT:   Head: Atraumatic  Mouth/Throat: No oropharyngeal exudate     Eyes: Conjunctivae are normal  Pupils are equal, round, and reactive to light  No scleral icterus  Neck: Normal range of motion  Neck supple  No JVD present  Cardiovascular: Normal rate and regular rhythm  Exam reveals no friction rub  No murmur heard  No edema pretibially  Pulmonary/Chest: Effort normal and breath sounds normal  No respiratory distress  He has no wheezes  He has no rales  Abdominal: Soft  Bowel sounds are normal  He exhibits no distension  There is no tenderness  There is no rebound  Musculoskeletal: Normal range of motion  Neurological: He is alert and oriented to person, place, and time     Nonfocal motor exam in gait normal          Current Weight: Weight - Scale: 68 5 kg (151 lb)  First Weight: Weight - Scale: 68 5 kg (151 lb)    Lab Results:                  Invalid input(s): LABALBU

## 2018-03-22 NOTE — LETTER
March 22, 2018     Tomer [place], DO  [address] [facility]    Patient: Gerardo Mathis   YOB: 1975   Date of Visit: 3/22/2018       Dear Dr oRbina Stinson:    Thank you for referring Jose Her to me for evaluation  Below are my notes for this consultation  If you have questions, please do not hesitate to call me  I look forward to following your patient along with you  Sincerely,        Karla Del Valle MD        CC: No Recipients  Karla Del Valle MD  3/22/2018  3:44 PM  Sign at close encounter  Consultation - Nephrology   Gerardo Mathis 38 y o  male MRN: [id number]  Unit/Bed#:  Encounter: [id number]      Assessment/Plan     Assessment / Plan:  1  Renal     The patient was incidentally discovered to have proteinuria when he went to his family physician was complaining of some urinary hesitancy  This was then repeated and confirmed to be present a few months later  He is here for evaluation of proteinuria and a nondiabetic  His last blood work showed a creatinine is 0 8 but that was close to 10 months ago and protein quantitation was around 1 8 g  This falls and the tubular interstitial range  Urinalysis did not have an active urine sediment is I did not see any red blood cells or red blood cell casts  At this point we need to initiate evaluation with repeat blood work and repeat urine quantitation after initiation of ACE-inhibitor  I am going to start lisinopril 10 mg a day then will repeat lab work to make sure creatinine is stable and remained normal and also the protein estimation  If we are able to reduce proteinuria with this conservative approach we can avoid a renal biopsy but if the proteinuria would increased significantly with a creatinine will begin to increase he would need a renal biopsy at that time  2   Blood pressure  The patient has never had a history of hypertension was on no medications he states he was just very anxious today at sweats a little elevated  I told him that even if his blood pressure is normal the low dose of this medication should not reduce it but if he has any symptoms of dizziness or low blood pressure please stop it and give me a call  History of Present Illness   Physician Requesting Consult: No att  providers found  Reason for Consult / Principal Problem:   Proteinuria  Hx and PE limited by:   HPI: Maricruz Huff is a 38y o  year old male who presents with proteinuria  The patient states he has been in good health any went for a physical in complained a little bit of urinary hesitancy in felt eaten empties bladder well  They underwent a urinalysis in the office and found protein in the urine  A period of time past the primary physician repeated this and also did a 24 hour urine revealed significant proteinuria he is here for evaluation  History obtained from chart review and patient  He does not take any over-the-counter nonsteroidals  Consults    Review of Systems   Constitutional: Negative  HENT: Negative  Eyes: Negative  Respiratory: Negative  Cardiovascular: Negative  Gastrointestinal: Negative  Endocrine: Negative  Genitourinary: Negative for difficulty urinating, dysuria and hematuria  Mild urinary hesitancy   Musculoskeletal: Negative  Negative for arthralgias  Neurological: Negative  Historical Information   Patient Active Problem List   Diagnosis    Proteinuria    Mild asthma  Past Medical History:   Diagnosis Date    Proteinuria     No history of diabetes, cancer, stroke, liver disease, lung disease, HIV, hepatitis, kidney stones  Past Surgical History:   Procedure Laterality Date    SHOULDER SURGERY       Social History  works as a nurse  No alcohol tobacco or drug abuse    History   Alcohol Use No     History   Drug Use No     History   Smoking Status    Never Smoker   Smokeless Tobacco    Never Used     Family History   Problem Relation Age of Onset    No Known Problems Mother     Diabetes Father        Meds/Allergies   current meds:   No current facility-administered medications for this visit  No Known Allergies    Objective   [unfilled]  Body mass index is 27 62 kg/m²  Invasive Devices:        PHYSICAL EXAM:  Ht 5' 2" (1 575 m)   Wt 68 5 kg (151 lb)   BMI 27 62 kg/m²      Physical Exam   Constitutional: He is oriented to person, place, and time  He appears well-nourished  No distress  HENT:   Head: Atraumatic  Mouth/Throat: No oropharyngeal exudate  Eyes: Conjunctivae are normal  Pupils are equal, round, and reactive to light  No scleral icterus  Neck: Normal range of motion  Neck supple  No JVD present  Cardiovascular: Normal rate and regular rhythm  Exam reveals no friction rub  No murmur heard  No edema pretibially  Pulmonary/Chest: Effort normal and breath sounds normal  No respiratory distress  He has no wheezes  He has no rales  Abdominal: Soft  Bowel sounds are normal  He exhibits no distension  There is no tenderness  There is no rebound  Musculoskeletal: Normal range of motion  Neurological: He is alert and oriented to person, place, and time     Nonfocal motor exam in gait normal          Current Weight: Weight - Scale: 68 5 kg (151 lb)  First Weight: Weight - Scale: 68 5 kg (151 lb)    Lab Results:                  Invalid input(s): LABALBU

## 2018-04-10 LAB
ANA SER QL IF: NEGATIVE
BUN SERPL-MCNC: 15 MG/DL (ref 7–25)
BUN/CREAT SERPL: NORMAL (CALC) (ref 6–22)
CALCIUM SERPL-MCNC: 8.9 MG/DL (ref 8.6–10.3)
CHLORIDE SERPL-SCNC: 105 MMOL/L (ref 98–110)
CO2 SERPL-SCNC: 29 MMOL/L (ref 20–31)
CREAT SERPL-MCNC: 0.96 MG/DL (ref 0.6–1.35)
CREAT UR-MCNC: 62 MG/DL (ref 20–370)
GLUCOSE SERPL-MCNC: 67 MG/DL (ref 65–99)
POTASSIUM SERPL-SCNC: 4.2 MMOL/L (ref 3.5–5.3)
PROT UR-MCNC: 34 MG/DL (ref 5–25)
PROT/CREAT UR: 548 MG/G CREAT (ref 22–128)
SL AMB EGFR AFRICAN AMERICAN: 114 ML/MIN/1.73M2
SL AMB EGFR NON AFRICAN AMERICAN: 98 ML/MIN/1.73M2
SODIUM SERPL-SCNC: 138 MMOL/L (ref 135–146)

## 2018-04-11 ENCOUNTER — TELEPHONE (OUTPATIENT)
Dept: NEPHROLOGY | Facility: CLINIC | Age: 43
End: 2018-04-11

## 2018-04-11 NOTE — TELEPHONE ENCOUNTER
----- Message from Kalli Garcia MD sent at 4/11/2018 12:24 PM EDT -----  Call patient tell him on the medicine his protein estimation went down to 500 which is a big improvement kidney function electrolytes were all still totally normal   Decreased tolerating the medication if there is any problems you can let me know but tell this is all good news and then I can just see him back in September with urine protein to creatinine ratio and BMP unless I had him come in at a different time I just for get when he was supposed to come back  But things look good

## 2018-04-12 DIAGNOSIS — R80.1 PERSISTENT PROTEINURIA: Primary | ICD-10-CM

## 2018-06-22 DIAGNOSIS — J45.30 MILD PERSISTENT ASTHMA WITHOUT COMPLICATION: Primary | ICD-10-CM

## 2018-07-05 ENCOUNTER — TELEPHONE (OUTPATIENT)
Dept: NEPHROLOGY | Facility: CLINIC | Age: 43
End: 2018-07-05

## 2018-07-13 DIAGNOSIS — J45.30 MILD PERSISTENT ASTHMA WITHOUT COMPLICATION: Primary | ICD-10-CM

## 2018-07-13 RX ORDER — ALBUTEROL SULFATE 2.5 MG/3ML
2.5 SOLUTION RESPIRATORY (INHALATION) EVERY 6 HOURS PRN
Qty: 3 ML | Refills: 0 | Status: SHIPPED | OUTPATIENT
Start: 2018-07-13 | End: 2019-04-10 | Stop reason: SDUPTHER

## 2018-07-13 RX ORDER — ALBUTEROL SULFATE 90 UG/1
2 AEROSOL, METERED RESPIRATORY (INHALATION) 4 TIMES DAILY
Qty: 3 INHALER | Refills: 0 | Status: SHIPPED | OUTPATIENT
Start: 2018-07-13 | End: 2019-04-10 | Stop reason: SDUPTHER

## 2018-07-13 NOTE — TELEPHONE ENCOUNTER
Pt called in for med refill for albuterol solution for nebulizer and albuterol inhaler  Pt was advised that he is overdue for ov  Pt last seen January 2018  Pt will call back to schedule appointment

## 2018-07-28 DIAGNOSIS — J45.30 MILD PERSISTENT ASTHMA WITHOUT COMPLICATION: ICD-10-CM

## 2018-09-14 LAB
BUN SERPL-MCNC: 12 MG/DL (ref 7–25)
BUN/CREAT SERPL: NORMAL (CALC) (ref 6–22)
CALCIUM SERPL-MCNC: 8.8 MG/DL (ref 8.6–10.3)
CHLORIDE SERPL-SCNC: 106 MMOL/L (ref 98–110)
CO2 SERPL-SCNC: 28 MMOL/L (ref 20–32)
CREAT SERPL-MCNC: 0.85 MG/DL (ref 0.6–1.35)
CREAT UR-MCNC: 202 MG/DL (ref 20–320)
GLUCOSE SERPL-MCNC: 90 MG/DL (ref 65–99)
POTASSIUM SERPL-SCNC: 4.3 MMOL/L (ref 3.5–5.3)
PROT UR-MCNC: 186 MG/DL (ref 5–25)
PROT/CREAT UR: 921 MG/G CREAT (ref 22–128)
SL AMB EGFR AFRICAN AMERICAN: 125 ML/MIN/1.73M2
SL AMB EGFR NON AFRICAN AMERICAN: 108 ML/MIN/1.73M2
SODIUM SERPL-SCNC: 140 MMOL/L (ref 135–146)

## 2018-09-17 ENCOUNTER — HOSPITAL ENCOUNTER (OUTPATIENT)
Dept: RADIOLOGY | Facility: HOSPITAL | Age: 43
Discharge: HOME | End: 2018-09-17
Payer: COMMERCIAL

## 2018-09-17 ENCOUNTER — OFFICE VISIT (OUTPATIENT)
Dept: FAMILY MEDICINE CLINIC | Facility: CLINIC | Age: 43
End: 2018-09-17
Payer: COMMERCIAL

## 2018-09-17 ENCOUNTER — TRANSCRIBE ORDERS (OUTPATIENT)
Dept: LAB | Facility: HOSPITAL | Age: 43
End: 2018-09-17

## 2018-09-17 ENCOUNTER — APPOINTMENT (OUTPATIENT)
Dept: LAB | Facility: HOSPITAL | Age: 43
End: 2018-09-17
Payer: COMMERCIAL

## 2018-09-17 VITALS
BODY MASS INDEX: 27.12 KG/M2 | HEART RATE: 68 BPM | SYSTOLIC BLOOD PRESSURE: 142 MMHG | WEIGHT: 147.4 LBS | HEIGHT: 62 IN | RESPIRATION RATE: 16 BRPM | DIASTOLIC BLOOD PRESSURE: 84 MMHG

## 2018-09-17 DIAGNOSIS — R51.9 CHRONIC NONINTRACTABLE HEADACHE, UNSPECIFIED HEADACHE TYPE: ICD-10-CM

## 2018-09-17 DIAGNOSIS — R05.9 COUGH: ICD-10-CM

## 2018-09-17 DIAGNOSIS — M79.604 PAIN OF RIGHT LOWER EXTREMITY: ICD-10-CM

## 2018-09-17 DIAGNOSIS — G89.29 CHRONIC BILATERAL LOW BACK PAIN WITHOUT SCIATICA: ICD-10-CM

## 2018-09-17 DIAGNOSIS — G89.29 CHRONIC NONINTRACTABLE HEADACHE, UNSPECIFIED HEADACHE TYPE: ICD-10-CM

## 2018-09-17 DIAGNOSIS — J45.40 MODERATE PERSISTENT ASTHMA, UNSPECIFIED WHETHER COMPLICATED: ICD-10-CM

## 2018-09-17 DIAGNOSIS — Z23 NEED FOR INFLUENZA VACCINATION: Primary | ICD-10-CM

## 2018-09-17 DIAGNOSIS — J45.30 MILD PERSISTENT ASTHMA WITHOUT COMPLICATION: ICD-10-CM

## 2018-09-17 DIAGNOSIS — M54.50 CHRONIC BILATERAL LOW BACK PAIN WITHOUT SCIATICA: ICD-10-CM

## 2018-09-17 PROBLEM — R80.8 OTHER PROTEINURIA: Status: ACTIVE | Noted: 2018-03-22

## 2018-09-17 PROBLEM — R03.0 ELEVATED BP WITHOUT DIAGNOSIS OF HYPERTENSION: Status: ACTIVE | Noted: 2018-09-17

## 2018-09-17 LAB
BASOPHILS # BLD AUTO: 0.01 THOUSANDS/ΜL (ref 0–0.1)
BASOPHILS NFR BLD AUTO: 0 % (ref 0–1)
CK MB SERPL-MCNC: 1 % (ref 0–2.5)
CK MB SERPL-MCNC: 6.6 NG/ML (ref 0–5)
CK SERPL-CCNC: 690 U/L (ref 39–308)
CRP SERPL QL: <3 MG/L
EOSINOPHIL # BLD AUTO: 0.1 THOUSAND/ΜL (ref 0–0.61)
EOSINOPHIL NFR BLD AUTO: 2 % (ref 0–6)
ERYTHROCYTE [DISTWIDTH] IN BLOOD BY AUTOMATED COUNT: 12.2 % (ref 11.6–15.1)
ERYTHROCYTE [SEDIMENTATION RATE] IN BLOOD: 8 MM/HOUR (ref 0–10)
HCT VFR BLD AUTO: 48.1 % (ref 36.5–49.3)
HGB BLD-MCNC: 15.8 G/DL (ref 12–17)
IMM GRANULOCYTES # BLD AUTO: 0.01 THOUSAND/UL (ref 0–0.2)
IMM GRANULOCYTES NFR BLD AUTO: 0 % (ref 0–2)
LYMPHOCYTES # BLD AUTO: 2.44 THOUSANDS/ΜL (ref 0.6–4.47)
LYMPHOCYTES NFR BLD AUTO: 53 % (ref 14–44)
MCH RBC QN AUTO: 29.3 PG (ref 26.8–34.3)
MCHC RBC AUTO-ENTMCNC: 32.8 G/DL (ref 31.4–37.4)
MCV RBC AUTO: 89 FL (ref 82–98)
MONOCYTES # BLD AUTO: 0.32 THOUSAND/ΜL (ref 0.17–1.22)
MONOCYTES NFR BLD AUTO: 7 % (ref 4–12)
NEUTROPHILS # BLD AUTO: 1.79 THOUSANDS/ΜL (ref 1.85–7.62)
NEUTS SEG NFR BLD AUTO: 38 % (ref 43–75)
NRBC BLD AUTO-RTO: 0 /100 WBCS
PLATELET # BLD AUTO: 176 THOUSANDS/UL (ref 149–390)
PMV BLD AUTO: 11.7 FL (ref 8.9–12.7)
RBC # BLD AUTO: 5.4 MILLION/UL (ref 3.88–5.62)
WBC # BLD AUTO: 4.67 THOUSAND/UL (ref 4.31–10.16)

## 2018-09-17 PROCEDURE — 86618 LYME DISEASE ANTIBODY: CPT

## 2018-09-17 PROCEDURE — 70220 X-RAY EXAM OF SINUSES: CPT

## 2018-09-17 PROCEDURE — 36415 COLL VENOUS BLD VENIPUNCTURE: CPT

## 2018-09-17 PROCEDURE — 86140 C-REACTIVE PROTEIN: CPT

## 2018-09-17 PROCEDURE — 82553 CREATINE MB FRACTION: CPT | Performed by: FAMILY MEDICINE

## 2018-09-17 PROCEDURE — 82550 ASSAY OF CK (CPK): CPT | Performed by: FAMILY MEDICINE

## 2018-09-17 PROCEDURE — 3008F BODY MASS INDEX DOCD: CPT | Performed by: FAMILY MEDICINE

## 2018-09-17 PROCEDURE — 94010 BREATHING CAPACITY TEST: CPT | Performed by: FAMILY MEDICINE

## 2018-09-17 PROCEDURE — 90471 IMMUNIZATION ADMIN: CPT

## 2018-09-17 PROCEDURE — 85652 RBC SED RATE AUTOMATED: CPT

## 2018-09-17 PROCEDURE — 85025 COMPLETE CBC W/AUTO DIFF WBC: CPT

## 2018-09-17 PROCEDURE — 99214 OFFICE O/P EST MOD 30 MIN: CPT | Performed by: FAMILY MEDICINE

## 2018-09-17 PROCEDURE — 71046 X-RAY EXAM CHEST 2 VIEWS: CPT

## 2018-09-17 PROCEDURE — 90686 IIV4 VACC NO PRSV 0.5 ML IM: CPT

## 2018-09-17 RX ORDER — MELOXICAM 7.5 MG/1
7.5 TABLET ORAL DAILY
Qty: 30 TABLET | Refills: 5 | Status: SHIPPED | OUTPATIENT
Start: 2018-09-17 | End: 2018-12-13 | Stop reason: CLARIF

## 2018-09-17 NOTE — PROGRESS NOTES
OFFICE FOLLOW UP - Nephrology   Robert Easton 41 y o  male MRN: [id number]       ASSESSMENT and PLAN:  [place] was seen today for follow-up, proteinuria and hypertension  Diagnoses and all orders for this visit:    Other proteinuria  -unsure etiology, however concerns for Hypertension despite no definitive diagnosis  -Will stop Lisinopril secondary to complaints of dry cough  -Will start Losartan 25 mg daily  -Will check BMP in 10 days and have him return to office for BP check in 2-4 weeks  -Will repeat BMP and UPCR in 6 months with return office appt with Dr Dori Restrepo  -     losartan (COZAAR) 25 mg tablet; Take 1 tablet (25 mg total) by mouth daily  -     Basic metabolic panel; Future  -     Basic metabolic panel; Future  -     Protein / creatinine ratio, urine; Future    Elevated BP without diagnosis of hypertension  -BP acceptable today  -Instructed on low sodium diet and information given  -Will see in 2-4 weeks for BP check with initiation of losartan   -Okay for NSAID for short term use       Patient Instructions   All questions asked and answered  The patient has been instructed to call office at [phone] with any questions or concerns  The patient has been instructed to obtain prescribed blood work and urine studies prior to next appointment   Avoid NSAID products to include Motrin, Ibuprofen, Aleve, Naprosyn, or naproxen   Education material " Kidney Disease: Eating Less Sodium" given to patient today  Stop Lisinopril secondary to dry cough  Start losartan 25 mg daily tomorrow  Get BMP in 10 days  Return to office for BP check in 2-4 weeks          HPI: Robert Easton is a 41 y o  male who is here for Follow-up; Proteinuria; and Hypertension  The patient returns to office for hypertension and proteinuria follow up  He was last seen on 3/22/2018 for initial evaluation for proteinuria  Lisinopril 10 mg daily was started    Repeat UPC after initiation of ACE I was 0 54, however repeat UPC on 9/13/18 revealed increased to 0 92  Of note, he was recently evaluated in PCP office with complaints of H/A and back pain  Blood work has been revealed  On discussion, he is feeling okay  Complaining of dry cough that worsens with activity  Denies chest pain, shortness of breath, headaches, nausea, vomiting or urinary issues  Reports seeing family doctor for lower back pain and was prescribed Mobic for pain control  ROS:   All the systems were reviewed and were negative except as documented on the HPI  Allergies: Molds & smuts and Shellfish-derived products    Medications:   Current Outpatient Prescriptions:     albuterol (2 5 mg/3 mL) 0 083 % nebulizer solution, Take 1 vial (2 5 mg total) by nebulization every 6 (six) hours as needed for wheezing or shortness of breath, Disp: 3 mL, Rfl: 0    albuterol (PROVENTIL HFA) 90 mcg/act inhaler, Inhale 2 puffs 4 (four) times a day, Disp: 3 Inhaler, Rfl: 0    meloxicam (MOBIC) 7 5 mg tablet, Take 1 tablet (7 5 mg total) by mouth daily (Patient taking differently: Take 7 5 mg by mouth as needed  ), Disp: 30 tablet, Rfl: 5    mometasone-formoterol (DULERA) 200-5 MCG/ACT inhaler, Inhale 2 puffs 2 (two) times a day Rinse mouth after use , Disp: 1 Inhaler, Rfl: 0    losartan (COZAAR) 25 mg tablet, Take 1 tablet (25 mg total) by mouth daily, Disp: 30 tablet, Rfl: 5    Past Medical History:   Diagnosis Date    Proteinuria      Past Surgical History:   Procedure Laterality Date    SHOULDER SURGERY       Family History   Problem Relation Age of Onset    No Known Problems Mother     Diabetes Father       reports that he has never smoked  He has never used smokeless tobacco  He reports that he does not drink alcohol or use drugs  Physical Exam:   Vitals:    09/18/18 1132 09/18/18 1157   BP:  132/88   Pulse:  68   Weight: 67 4 kg (148 lb 9 6 oz)    Height: 5' 3" (1 6 m)      Body mass index is 26 32 kg/m²      General: cooperative, in not acute distress  Eyes: conjunctivae pink, anicteric sclerae  ENT: lips and mucous membranes moist  Neck: supple, no JVD  Chest: clear breath sounds bilateral, no crackles, ronchus or wheezings  CVS: distinct S1 & S2, normal rate, regular rhythm  Abdomen: soft, non-tender, non-distended, normoactive bowel sounds  Extremities: no edema of both legs  Skin: no rash  Neuro: awake, alert, oriented      Lab Results:  Results for orders placed or performed in visit on 09/17/18   C-reactive protein   Result Value Ref Range    CRP <3 0 <3 0 mg/L   Sedimentation rate, automated   Result Value Ref Range    Sed Rate 8 0 - 10 mm/hour   CBC and differential   Result Value Ref Range    WBC 4 67 4 31 - 10 16 Thousand/uL    RBC 5 40 3 88 - 5 62 Million/uL    Hemoglobin 15 8 12 0 - 17 0 g/dL    Hematocrit 48 1 36 5 - 49 3 %    MCV 89 82 - 98 fL    MCH 29 3 26 8 - 34 3 pg    MCHC 32 8 31 4 - 37 4 g/dL    RDW 12 2 11 6 - 15 1 %    MPV 11 7 8 9 - 12 7 fL    Platelets 623 170 - 702 Thousands/uL    nRBC 0 /100 WBCs    Neutrophils Relative 38 (L) 43 - 75 %    Immat GRANS % 0 0 - 2 %    Lymphocytes Relative 53 (H) 14 - 44 %    Monocytes Relative 7 4 - 12 %    Eosinophils Relative 2 0 - 6 %    Basophils Relative 0 0 - 1 %    Neutrophils Absolute 1 79 (L) 1 85 - 7 62 Thousands/µL    Immature Grans Absolute 0 01 0 00 - 0 20 Thousand/uL    Lymphocytes Absolute 2 44 0 60 - 4 47 Thousands/µL    Monocytes Absolute 0 32 0 17 - 1 22 Thousand/µL    Eosinophils Absolute 0 10 0 00 - 0 61 Thousand/µL    Basophils Absolute 0 01 0 00 - 0 10 Thousands/µL         Results from last 7 days  Lab Units 09/17/18  1659 09/13/18  1013   WBC Thousand/uL 4 67  --    HEMOGLOBIN g/dL 15 8  --    HEMATOCRIT % 48 1  --    PLATELETS Thousands/uL 176  --    CHLORIDE mmol/L  --  106   CO2 mmol/L  --  28   BUN mg/dL  --  12   CREATININE mg/dL  --  0 85   SL AMB CALCIUM mg/dL  --  8 8         Portions of the record may have been created with voice recognition software   Occasional wrong word or "sound a like" substitutions may have occurred due to the inherent limitations of voice recognition software  Read the chart carefully and recognize, using context, where substitutions have occurred  If you have any questions, please contact the dictating provider

## 2018-09-17 NOTE — PROGRESS NOTES
Assessment/Plan:    Mild persistent asthma without complication  Needs spirometry    Chronic bilateral low back pain without sciatica  Continue muscle relaxant    Protein in urine  Seeing kidney doc tomorrow-await their input on nSAID       Diagnoses and all orders for this visit:    Need for influenza vaccination  -     Cancel: influenza vaccine, 1148-1975, quadrivalent, recombinant, PF, 0 5 mL, for patients 18-49 yr with comorbidities (FLUBLOK)  -     Cancel: influenza vaccine, 8841-0279, quadrivalent, recombinant, PF, 0 5 mL, for patients 18-49 yr with comorbidities (FLUBLOK)  -     SYRINGE/SINGLE-DOSE VIAL: influenza vaccine, 6758-7985, quadrivalent, 0 5 mL, preservative-free, for patients 3+ yr (FLUZONE)    Mild persistent asthma without complication  -     POCT spirometry    Chronic bilateral low back pain without sciatica  -     CK  -     C-reactive protein; Future  -     Sedimentation rate, automated; Future  -     CBC and differential; Future  -     Lyme Antibody Profile with reflex to WB; Future    Chronic nonintractable headache, unspecified headache type  -     XR sinuses < 3 vw; Future    Cough  -     XR sinuses < 3 vw; Future  -     XR chest pa & lateral; Future    Pain of right lower extremity  -     CK  -     C-reactive protein; Future  -     Sedimentation rate, automated; Future  -     CBC and differential; Future  -     Lyme Antibody Profile with reflex to WB; Future          Subjective:      Patient ID: Robert Easton is a 41 y o  male  cramping r leg  Headache r side of head,trouble swallowing, spots before l eye      Back Pain   This is a chronic problem  The current episode started more than 1 month ago  The problem occurs constantly  The problem is unchanged  The pain is present in the lumbar spine and thoracic spine  The quality of the pain is described as aching  Radiates to: cramping r leg  The pain is at a severity of 5/10  The pain is moderate  The symptoms are aggravated by bending  Associated symptoms include headaches and leg pain  Pertinent negatives include no bladder incontinence, bowel incontinence, chest pain or dysuria  He has tried analgesics and home exercises (exercise helps) for the symptoms  The treatment provided mild relief  Leg Pain    The incident occurred more than 1 week ago  There was no injury mechanism  The pain is present in the right leg  The quality of the pain is described as cramping  The pain is at a severity of 3/10  The pain is mild  The pain has been fluctuating since onset  Pertinent negatives include no inability to bear weight, loss of sensation or muscle weakness  Nothing aggravates the symptoms  He has tried acetaminophen for the symptoms  The treatment provided mild relief  Headache    This is a new problem  The current episode started more than 1 month ago  The problem occurs intermittently  The problem has been unchanged  The pain is located in the right unilateral region  Radiates to: r ear  The quality of the pain is described as aching  The pain is at a severity of 3/10  The pain is mild  Associated symptoms include back pain and coughing  Nothing aggravates the symptoms  He has tried acetaminophen for the symptoms  The treatment provided mild relief  The following portions of the patient's history were reviewed and updated as appropriate: allergies, current medications, past family history, past medical history, past social history, past surgical history and problem list       Review of Systems   Constitutional: Positive for fatigue  Negative for activity change and appetite change  Eyes: Positive for visual disturbance  Respiratory: Positive for cough  Due to lisinopril   Cardiovascular: Negative for chest pain  Gastrointestinal: Negative for bowel incontinence  Genitourinary: Negative for bladder incontinence and dysuria  Musculoskeletal: Positive for back pain  Neurological: Positive for headaches  Psychiatric/Behavioral: Positive for sleep disturbance  Objective:      /92 (BP Location: Left arm, Patient Position: Sitting, Cuff Size: Standard)   Pulse 68   Resp 16   Ht 5' 2" (1 575 m)   Wt 66 9 kg (147 lb 6 4 oz)   BMI 26 96 kg/m²          Physical Exam   Constitutional: He appears well-developed and well-nourished  HENT:   Right Ear: Tympanic membrane normal    Left Ear: Tympanic membrane normal    Nose: Mucosal edema and rhinorrhea present  Right sinus exhibits no maxillary sinus tenderness and no frontal sinus tenderness  Left sinus exhibits no maxillary sinus tenderness and no frontal sinus tenderness  Mouth/Throat: No oropharyngeal exudate, posterior oropharyngeal edema or posterior oropharyngeal erythema  Neck: No thyromegaly present  Cardiovascular: Normal rate, regular rhythm and normal heart sounds  Pulmonary/Chest: Breath sounds normal    Musculoskeletal: He exhibits no edema or tenderness  Lymphadenopathy:     He has no cervical adenopathy  Vitals reviewed

## 2018-09-18 ENCOUNTER — OFFICE VISIT (OUTPATIENT)
Dept: NEPHROLOGY | Facility: CLINIC | Age: 43
End: 2018-09-18
Payer: COMMERCIAL

## 2018-09-18 VITALS
SYSTOLIC BLOOD PRESSURE: 132 MMHG | WEIGHT: 148.6 LBS | HEIGHT: 63 IN | HEART RATE: 68 BPM | DIASTOLIC BLOOD PRESSURE: 88 MMHG | BODY MASS INDEX: 26.33 KG/M2

## 2018-09-18 DIAGNOSIS — R80.8 OTHER PROTEINURIA: Primary | ICD-10-CM

## 2018-09-18 DIAGNOSIS — R03.0 ELEVATED BP WITHOUT DIAGNOSIS OF HYPERTENSION: ICD-10-CM

## 2018-09-18 PROCEDURE — 99213 OFFICE O/P EST LOW 20 MIN: CPT | Performed by: NURSE PRACTITIONER

## 2018-09-18 RX ORDER — LOSARTAN POTASSIUM 25 MG/1
25 TABLET ORAL DAILY
Qty: 30 TABLET | Refills: 5 | Status: ON HOLD | OUTPATIENT
Start: 2018-09-18 | End: 2019-02-21 | Stop reason: ALTCHOICE

## 2018-09-18 NOTE — PATIENT INSTRUCTIONS
All questions asked and answered  The patient has been instructed to call office at [phone] with any questions or concerns     The patient has been instructed to obtain prescribed blood work and urine studies prior to next appointment   Avoid NSAID products to include Motrin, Ibuprofen, Aleve, Naprosyn, or naproxen   Education material " Kidney Disease: Eating Less Sodium" given to patient today  Stop Lisinopril secondary to dry cough  Start losartan 25 mg daily tomorrow  Get BMP in 10 days  Return to office for BP check in 2-4 weeks

## 2018-09-19 ENCOUNTER — TELEPHONE (OUTPATIENT)
Dept: FAMILY MEDICINE CLINIC | Facility: CLINIC | Age: 43
End: 2018-09-19

## 2018-09-19 LAB
B BURGDOR IGG SER IA-ACNC: 0.2
B BURGDOR IGM SER IA-ACNC: 0.23

## 2018-09-19 NOTE — TELEPHONE ENCOUNTER
----- Message from Moshe Rider MD sent at 9/18/2018  6:07 PM EDT -----  cpk quiite high-rec rheum eval-find out who takes insurance, needs to go for xryas

## 2018-09-19 NOTE — TELEPHONE ENCOUNTER
Patient notified  Patient also wanted to let Dr Samantha Pino know that he called his specialist and he was told to call rheumatology to schedule an appointment

## 2018-09-19 NOTE — TELEPHONE ENCOUNTER
----- Message from Linda Linda MD sent at 9/19/2018  5:16 PM EDT -----  Lyme nl, only abnormality is increased CPK previously addressed

## 2018-09-19 NOTE — TELEPHONE ENCOUNTER
Spoke with pt   Made him aware his results are elevated and to contact his ins to find out what rheumatologist is in network

## 2018-09-20 DIAGNOSIS — R74.8 ELEVATED CPK: Primary | ICD-10-CM

## 2018-09-20 NOTE — TELEPHONE ENCOUNTER
Pt called back in and stated he has an rheumatology appt with Dr Giovana Serrano and he will like you to generate a referral please advise

## 2018-09-21 ENCOUNTER — TELEPHONE (OUTPATIENT)
Dept: FAMILY MEDICINE CLINIC | Facility: CLINIC | Age: 43
End: 2018-09-21

## 2018-09-21 DIAGNOSIS — J32.0 CHRONIC MAXILLARY SINUSITIS: Primary | ICD-10-CM

## 2018-09-21 RX ORDER — AMOXICILLIN 400 MG/5ML
POWDER, FOR SUSPENSION ORAL
Qty: 300 ML | Refills: 0 | Status: SHIPPED | OUTPATIENT
Start: 2018-09-21 | End: 2018-10-12

## 2018-09-28 LAB
BUN SERPL-MCNC: 8 MG/DL (ref 7–25)
BUN/CREAT SERPL: NORMAL (CALC) (ref 6–22)
CALCIUM SERPL-MCNC: 8.9 MG/DL (ref 8.6–10.3)
CHLORIDE SERPL-SCNC: 106 MMOL/L (ref 98–110)
CO2 SERPL-SCNC: 30 MMOL/L (ref 20–32)
CREAT SERPL-MCNC: 0.81 MG/DL (ref 0.6–1.35)
GLUCOSE SERPL-MCNC: 93 MG/DL (ref 65–99)
POTASSIUM SERPL-SCNC: 4.2 MMOL/L (ref 3.5–5.3)
SL AMB EGFR AFRICAN AMERICAN: 128 ML/MIN/1.73M2
SL AMB EGFR NON AFRICAN AMERICAN: 110 ML/MIN/1.73M2
SODIUM SERPL-SCNC: 139 MMOL/L (ref 135–146)

## 2018-10-19 ENCOUNTER — TELEPHONE (OUTPATIENT)
Dept: FAMILY MEDICINE CLINIC | Facility: CLINIC | Age: 43
End: 2018-10-19

## 2018-10-19 ENCOUNTER — OFFICE VISIT (OUTPATIENT)
Dept: FAMILY MEDICINE CLINIC | Facility: CLINIC | Age: 43
End: 2018-10-19
Payer: COMMERCIAL

## 2018-10-19 VITALS
TEMPERATURE: 97.6 F | WEIGHT: 144.13 LBS | HEIGHT: 61 IN | SYSTOLIC BLOOD PRESSURE: 132 MMHG | RESPIRATION RATE: 16 BRPM | HEART RATE: 76 BPM | BODY MASS INDEX: 27.21 KG/M2 | DIASTOLIC BLOOD PRESSURE: 82 MMHG

## 2018-10-19 DIAGNOSIS — R74.8 ELEVATED CPK: Primary | ICD-10-CM

## 2018-10-19 DIAGNOSIS — J45.40 MODERATE PERSISTENT ASTHMA, UNSPECIFIED WHETHER COMPLICATED: Primary | ICD-10-CM

## 2018-10-19 DIAGNOSIS — R80.8 OTHER PROTEINURIA: ICD-10-CM

## 2018-10-19 PROCEDURE — 1036F TOBACCO NON-USER: CPT | Performed by: FAMILY MEDICINE

## 2018-10-19 PROCEDURE — 3008F BODY MASS INDEX DOCD: CPT | Performed by: FAMILY MEDICINE

## 2018-10-19 PROCEDURE — 99213 OFFICE O/P EST LOW 20 MIN: CPT | Performed by: FAMILY MEDICINE

## 2018-10-19 RX ORDER — MELOXICAM 15 MG/1
TABLET ORAL
COMMUNITY
End: 2018-10-19 | Stop reason: CLARIF

## 2018-10-19 NOTE — TELEPHONE ENCOUNTER
Pt is very concerned about not getting answers for protein in urine and that your NP  Scheduled him 6  Months out/ Is there any way he can see you sooner or you can call him to discuss his concerns-he wants some answers   Realize w/u still in progress and there may no true answers for proteinuria as of yet but pt would like a better plan than 6  Month follow yup

## 2018-10-19 NOTE — PROGRESS NOTES
Assessment/Plan: Moderate persistent asthma  Asthma doing better    Other proteinuria  On BP meds for protein in urine       There are no diagnoses linked to this encounter  Subjective:      Patient ID: Toya Palacios is a 41 y o  male  F/u asthma and protein in urine-still having foamy urine occasional        The following portions of the patient's history were reviewed and updated as appropriate: allergies, current medications, past family history, past medical history, past social history, past surgical history and problem list     Review of Systems   Constitutional: Negative for activity change, appetite change and unexpected weight change  Respiratory: Negative for shortness of breath  Cardiovascular: Negative for chest pain  Genitourinary: Negative for difficulty urinating  Neurological: Negative for headaches  Objective:      /82 (BP Location: Left arm, Patient Position: Sitting, Cuff Size: Adult)   Pulse 76   Temp 97 6 °F (36 4 °C) (Temporal)   Resp 16   Ht 5' 1 03" (1 55 m)   Wt 65 4 kg (144 lb 2 oz)   BMI 27 21 kg/m²          Physical Exam   Constitutional: He appears well-developed and well-nourished  Neck: No thyromegaly present  Cardiovascular: Normal rate, regular rhythm and normal heart sounds  Pulmonary/Chest: Breath sounds normal    Musculoskeletal: He exhibits no edema  Lymphadenopathy:     He has no cervical adenopathy  Vitals reviewed

## 2018-10-22 DIAGNOSIS — R80.1 PERSISTENT PROTEINURIA: Primary | ICD-10-CM

## 2018-10-22 NOTE — TELEPHONE ENCOUNTER
A follow up appointment has been made and [facility] lab slip has been mailed out to the patient

## 2018-10-22 NOTE — TELEPHONE ENCOUNTER
Have him see me next available and when that is get urine protein/creatinine ratio  Could be next month or two

## 2018-11-02 ENCOUNTER — TELEPHONE (OUTPATIENT)
Dept: FAMILY MEDICINE CLINIC | Facility: CLINIC | Age: 43
End: 2018-11-02

## 2018-11-21 DIAGNOSIS — M54.50 CHRONIC BILATERAL LOW BACK PAIN WITHOUT SCIATICA: Primary | ICD-10-CM

## 2018-11-21 DIAGNOSIS — G89.29 CHRONIC BILATERAL LOW BACK PAIN WITHOUT SCIATICA: Primary | ICD-10-CM

## 2018-11-21 LAB — CK SERPL-CCNC: 976 U/L (ref 44–196)

## 2018-11-23 ENCOUNTER — TELEPHONE (OUTPATIENT)
Dept: FAMILY MEDICINE CLINIC | Facility: CLINIC | Age: 43
End: 2018-11-23

## 2018-11-23 RX ORDER — CYCLOBENZAPRINE HCL 10 MG
10 TABLET ORAL
Qty: 30 TABLET | Refills: 0 | Status: SHIPPED | OUTPATIENT
Start: 2018-11-23 | End: 2018-12-13 | Stop reason: CLARIF

## 2018-11-23 NOTE — TELEPHONE ENCOUNTER
----- Message from Tristan Diamond MD sent at 11/22/2018  9:42 PM EST -----  cpk higher, does pt have f/u with rheum?

## 2018-11-23 NOTE — TELEPHONE ENCOUNTER
Pt called looking for cyclobenzaprine 10mg I po qd #30  but I didn't see it on his current med list but is in [place] [facility] rx'd back in April 2017, he uses only if needed  Please send a new script over for pt

## 2018-12-06 LAB
CREAT UR-MCNC: 23 MG/DL (ref 20–320)
PROT UR-MCNC: 66 MG/DL (ref 5–25)
PROT/CREAT UR: 2870 MG/G CREAT (ref 22–128)

## 2018-12-13 ENCOUNTER — OFFICE VISIT (OUTPATIENT)
Dept: NEPHROLOGY | Facility: CLINIC | Age: 43
End: 2018-12-13
Payer: COMMERCIAL

## 2018-12-13 VITALS
HEART RATE: 80 BPM | DIASTOLIC BLOOD PRESSURE: 82 MMHG | SYSTOLIC BLOOD PRESSURE: 136 MMHG | HEIGHT: 61 IN | BODY MASS INDEX: 26.81 KG/M2 | WEIGHT: 142 LBS

## 2018-12-13 DIAGNOSIS — R80.8 OTHER PROTEINURIA: Primary | ICD-10-CM

## 2018-12-13 PROCEDURE — 99214 OFFICE O/P EST MOD 30 MIN: CPT | Performed by: INTERNAL MEDICINE

## 2018-12-13 NOTE — LETTER
December 13, 2018     MD Cody Yepez [phone]    Patient: Jun Vu   YOB: 1975   Date of Visit: 12/13/2018       Dear Dr Africa Scott:    Thank you for referring Niki Heredia to me for evaluation  Below are my notes for this consultation  If you have questions, please do not hesitate to call me  I look forward to following your patient along with you  Sincerely,        Nisha Monge MD        CC: No Recipients  Nisha Monge MD  12/13/2018  8:43 AM  Sign at close encounter  NEPHROLOGY PROGRESS NOTE    Jun Vu 41 y o  male MRN: [id number]  Unit/Bed#:  Encounter: [id number]  Reason for Consult:  Proteinuria    Patient is here for routine follow-up related to proteinuria  Overall the patient has been doing well from a health standpoint but of course he is anxious worry about it  His cough did improve after stopping lisinopril and he is on losartan at a low dose and had 1 episode of dizziness but other wise doing okay  ASSESSMENT/PLAN:  1  Renal  Patient is referred for evaluation of proteinuria and we have been monitor it for about a year and it has ranged anywhere from 500 mg to 2 8 g  His most recent estimation has increased again over 2000 mg  The only change was switching from lisinopril to losartan so it is unsure whether not that is the cause or whether not it is related to a change in the underlying condition going on  We are going to do a 24 hr urine for protein and creatinine to truly measure the amount of proteinuria then based on that decide whether not we are going to try and optimize medical therapy versus kidney biopsy  I discussed this at length with the patient answered his questions explained to him the procedure and potential treatments  He will continue with losartan blood pressure is acceptable and I will contact him with results as above  SUBJECTIVE:  Review of Systems   Constitution: Negative  HENT: Negative      Eyes: Negative  Cardiovascular: Negative  Negative for dyspnea on exertion and leg swelling  Respiratory: Negative  Negative for cough, hemoptysis and shortness of breath  Skin: Negative for rash  Musculoskeletal: Negative  Gastrointestinal: Negative  Genitourinary: Negative for dysuria, hematuria and incomplete emptying  Foamy urine  Neurological: Negative  OBJECTIVE:  Current Weight: Weight - Scale: 64 4 kg (142 lb)  [email or web address] com:     Blood pressure 136/82, pulse 80, height 5' 1 03" (1 55 m), weight 64 4 kg (142 lb)  , Body mass index is 26 8 kg/m²  [unfilled]    Physical Exam: /82 (BP Location: Left arm, Patient Position: Sitting, Cuff Size: Standard)   Pulse 80   Ht 5' 1 03" (1 55 m)   Wt 64 4 kg (142 lb)   BMI 26 80 kg/m²    Physical Exam   Constitutional: He is oriented to person, place, and time  He appears well-nourished  No distress  HENT:   Head: Atraumatic  Mouth/Throat: No oropharyngeal exudate  Eyes: Pupils are equal, round, and reactive to light  Conjunctivae are normal  No scleral icterus  Neck: Neck supple  No JVD present  Cardiovascular: Normal rate and regular rhythm  Exam reveals no friction rub  No edema  Pulmonary/Chest: Effort normal and breath sounds normal  No respiratory distress  He has no wheezes  He has no rales  Abdominal: Soft  Bowel sounds are normal  He exhibits no distension  There is no tenderness  There is no rebound  Neurological: He is alert and oriented to person, place, and time         Medications:    Current Outpatient Prescriptions:     albuterol (2 5 mg/3 mL) 0 083 % nebulizer solution, Take 1 vial (2 5 mg total) by nebulization every 6 (six) hours as needed for wheezing or shortness of breath, Disp: 3 mL, Rfl: 0    albuterol (PROVENTIL HFA) 90 mcg/act inhaler, Inhale 2 puffs 4 (four) times a day, Disp: 3 Inhaler, Rfl: 0    losartan (COZAAR) 25 mg tablet, Take 1 tablet (25 mg total) by mouth daily, Disp: 30 tablet, Rfl: 5    mometasone-formoterol (DULERA) 200-5 MCG/ACT inhaler, Inhale 2 puffs 2 (two) times a day Rinse mouth after use , Disp: 1 Inhaler, Rfl: 0    mometasone-formoterol (DULERA) 200-5 MCG/ACT inhaler, Dulera 200 mcg-5 mcg/actuation HFA aerosol inhaler, Disp: , Rfl:     Laboratory Results:  Lab Results   Component Value Date    WBC 4 67 09/17/2018    HGB 15 8 09/17/2018    HCT 48 1 09/17/2018    MCV 89 09/17/2018     09/17/2018     Lab Results   Component Value Date    CALCIUM 8 9 09/28/2018     04/07/2017    K 4 2 09/28/2018    CO2 30 09/28/2018     09/28/2018    BUN 8 09/28/2018    CREATININE 0 97 04/07/2017     Lab Results   Component Value Date    CALCIUM 8 9 09/28/2018     No results found for: LABPROT

## 2018-12-13 NOTE — PROGRESS NOTES
NEPHROLOGY PROGRESS NOTE    Isela Carpenter 41 y o  male MRN: [id number]  Unit/Bed#:  Encounter: [id number]  Reason for Consult:  Proteinuria    Patient is here for routine follow-up related to proteinuria  Overall the patient has been doing well from a health standpoint but of course he is anxious worry about it  His cough did improve after stopping lisinopril and he is on losartan at a low dose and had 1 episode of dizziness but other wise doing okay  ASSESSMENT/PLAN:  1  Renal  Patient is referred for evaluation of proteinuria and we have been monitor it for about a year and it has ranged anywhere from 500 mg to 2 8 g  His most recent estimation has increased again over 2000 mg  The only change was switching from lisinopril to losartan so it is unsure whether not that is the cause or whether not it is related to a change in the underlying condition going on  We are going to do a 24 hr urine for protein and creatinine to truly measure the amount of proteinuria then based on that decide whether not we are going to try and optimize medical therapy versus kidney biopsy  I discussed this at length with the patient answered his questions explained to him the procedure and potential treatments  He will continue with losartan blood pressure is acceptable and I will contact him with results as above  SUBJECTIVE:  Review of Systems   Constitution: Negative  HENT: Negative  Eyes: Negative  Cardiovascular: Negative  Negative for dyspnea on exertion and leg swelling  Respiratory: Negative  Negative for cough, hemoptysis and shortness of breath  Skin: Negative for rash  Musculoskeletal: Negative  Gastrointestinal: Negative  Genitourinary: Negative for dysuria, hematuria and incomplete emptying  Foamy urine  Neurological: Negative          OBJECTIVE:  Current Weight: Weight - Scale: 64 4 kg (142 lb)  [email or web address]:     Blood pressure 136/82, pulse 80, height 5' 1 03" (1 55 m), weight 64 4 kg (142 lb)  , Body mass index is 26 8 kg/m²  [unfilled]    Physical Exam: /82 (BP Location: Left arm, Patient Position: Sitting, Cuff Size: Standard)   Pulse 80   Ht 5' 1 03" (1 55 m)   Wt 64 4 kg (142 lb)   BMI 26 80 kg/m²   Physical Exam   Constitutional: He is oriented to person, place, and time  He appears well-nourished  No distress  HENT:   Head: Atraumatic  Mouth/Throat: No oropharyngeal exudate  Eyes: Pupils are equal, round, and reactive to light  Conjunctivae are normal  No scleral icterus  Neck: Neck supple  No JVD present  Cardiovascular: Normal rate and regular rhythm  Exam reveals no friction rub  No edema  Pulmonary/Chest: Effort normal and breath sounds normal  No respiratory distress  He has no wheezes  He has no rales  Abdominal: Soft  Bowel sounds are normal  He exhibits no distension  There is no tenderness  There is no rebound  Neurological: He is alert and oriented to person, place, and time         Medications:    Current Outpatient Prescriptions:     albuterol (2 5 mg/3 mL) 0 083 % nebulizer solution, Take 1 vial (2 5 mg total) by nebulization every 6 (six) hours as needed for wheezing or shortness of breath, Disp: 3 mL, Rfl: 0    albuterol (PROVENTIL HFA) 90 mcg/act inhaler, Inhale 2 puffs 4 (four) times a day, Disp: 3 Inhaler, Rfl: 0    losartan (COZAAR) 25 mg tablet, Take 1 tablet (25 mg total) by mouth daily, Disp: 30 tablet, Rfl: 5    mometasone-formoterol (DULERA) 200-5 MCG/ACT inhaler, Inhale 2 puffs 2 (two) times a day Rinse mouth after use , Disp: 1 Inhaler, Rfl: 0    mometasone-formoterol (DULERA) 200-5 MCG/ACT inhaler, Dulera 200 mcg-5 mcg/actuation HFA aerosol inhaler, Disp: , Rfl:     Laboratory Results:  Lab Results   Component Value Date    WBC 4 67 09/17/2018    HGB 15 8 09/17/2018    HCT 48 1 09/17/2018    MCV 89 09/17/2018     09/17/2018     Lab Results   Component Value Date    CALCIUM 8 9 09/28/2018     04/07/2017    K 4 2 09/28/2018    CO2 30 09/28/2018     09/28/2018    BUN 8 09/28/2018    CREATININE 0 97 04/07/2017     Lab Results   Component Value Date    CALCIUM 8 9 09/28/2018     No results found for: LABPROT

## 2018-12-13 NOTE — PATIENT INSTRUCTIONS
You are here for follow-up to monitor protein in the urine  The blood test your kidney function call creatinine is still normal as it is less than 1  Unfortunately as we discussed the protein level or estimation increased to over 2 g  Over the course of the year it has been up and down and the only change was from lisinopril to losartan some unsure if that affected it or if there is the actual changes in the kidney  We will do a 24 hour urine collection for protein to truly measure it and then we will discuss whether not we should proceed with kidney biopsy or titrating your medications to help lower protein  Your total creatine kinase is the muscle enzyme that was elevated slightly and your doctor is going to monitor that

## 2018-12-26 LAB
BUN SERPL-MCNC: 9 MG/DL (ref 7–25)
BUN/CREAT SERPL: ABNORMAL (CALC) (ref 6–22)
CALCIUM SERPL-MCNC: 8.8 MG/DL (ref 8.6–10.3)
CHLORIDE SERPL-SCNC: 106 MMOL/L (ref 98–110)
CO2 SERPL-SCNC: 30 MMOL/L (ref 20–32)
CREAT 24H UR-MRATE: 1.69 G/24 H (ref 0.5–2.15)
CREAT SERPL-MCNC: 0.75 MG/DL (ref 0.6–1.35)
GLUCOSE SERPL-MCNC: 108 MG/DL (ref 65–99)
POTASSIUM SERPL-SCNC: 3.8 MMOL/L (ref 3.5–5.3)
PROT 24H UR-MRATE: 4740 MG/24 H
PROT/CREAT 24H UR: 2801 MG/G CREAT
SL AMB EGFR AFRICAN AMERICAN: 132 ML/MIN/1.73M2
SL AMB EGFR NON AFRICAN AMERICAN: 114 ML/MIN/1.73M2
SODIUM SERPL-SCNC: 141 MMOL/L (ref 135–146)

## 2019-01-02 ENCOUNTER — TELEPHONE (OUTPATIENT)
Dept: FAMILY MEDICINE CLINIC | Facility: CLINIC | Age: 44
End: 2019-01-02

## 2019-01-02 NOTE — TELEPHONE ENCOUNTER
Dr Helen Maurice ordering provider and he just reviewed lab an hour ago-his office should be contacting pt by end of day

## 2019-01-08 ENCOUNTER — TELEPHONE (OUTPATIENT)
Dept: NEPHROLOGY | Facility: CLINIC | Age: 44
End: 2019-01-08

## 2019-01-08 NOTE — TELEPHONE ENCOUNTER
Second attempt called and left a voicemail in regards to scheduling patient for his march follow up    Left our call back number

## 2019-01-10 ENCOUNTER — TELEPHONE (OUTPATIENT)
Dept: NEPHROLOGY | Facility: CLINIC | Age: 44
End: 2019-01-10

## 2019-01-10 NOTE — TELEPHONE ENCOUNTER
Patient was left with three voice mails in regards to scheduling him for his march follow up appointment  Also a letter was sent on 1 10 19 to call and schedule the follow up appointment  If patient calls in regards to scheduling his follow up he is all set for an appointment already on 03 13 2019 with Dr Branden Madrigal

## 2019-01-11 ENCOUNTER — OFFICE VISIT (OUTPATIENT)
Dept: FAMILY MEDICINE CLINIC | Facility: CLINIC | Age: 44
End: 2019-01-11
Payer: COMMERCIAL

## 2019-01-11 VITALS
BODY MASS INDEX: 28.89 KG/M2 | TEMPERATURE: 98.1 F | DIASTOLIC BLOOD PRESSURE: 100 MMHG | WEIGHT: 153 LBS | OXYGEN SATURATION: 98 % | HEIGHT: 61 IN | SYSTOLIC BLOOD PRESSURE: 142 MMHG | RESPIRATION RATE: 18 BRPM | HEART RATE: 81 BPM

## 2019-01-11 DIAGNOSIS — H65.92 LEFT NON-SUPPURATIVE OTITIS MEDIA: ICD-10-CM

## 2019-01-11 DIAGNOSIS — I88.9 LYMPHADENITIS: Primary | ICD-10-CM

## 2019-01-11 PROCEDURE — 99213 OFFICE O/P EST LOW 20 MIN: CPT | Performed by: FAMILY MEDICINE

## 2019-01-11 PROCEDURE — 3008F BODY MASS INDEX DOCD: CPT | Performed by: FAMILY MEDICINE

## 2019-01-11 NOTE — PROGRESS NOTES
Assessment/Plan:    No problem-specific Assessment & Plan notes found for this encounter  Diagnoses and all orders for this visit:    Lymphadenitis  -     cefuroxime (CEFTIN) 250 mg/5 mL suspension; 10 ml po bid    Left non-suppurative otitis media  -     cefuroxime (CEFTIN) 250 mg/5 mL suspension; 10 ml po bid          Subjective:      Patient ID: Tina Cabral is a 41 y o  male  URI    This is a new problem  The current episode started in the past 7 days  The problem has been gradually worsening  There has been no fever  Associated symptoms include congestion, coughing, rhinorrhea and sinus pain  Pertinent negatives include no ear pain or headaches  He has tried acetaminophen and sleep for the symptoms  The treatment provided mild relief  The following portions of the patient's history were reviewed and updated as appropriate: allergies, current medications, past family history, past medical history, past social history, past surgical history and problem list     Review of Systems   Constitutional: Positive for chills  Negative for activity change, appetite change and fever  HENT: Positive for congestion, rhinorrhea and sinus pain  Negative for ear pain  Respiratory: Positive for cough  Neurological: Negative for headaches  Hematological: Positive for adenopathy  Objective:      /100 (BP Location: Left arm, Patient Position: Sitting, Cuff Size: Standard)   Pulse 81   Temp 98 1 °F (36 7 °C) (Temporal)   Resp 18   Ht 5' 1" (1 549 m)   Wt 69 4 kg (153 lb)   SpO2 98%   BMI 28 91 kg/m²          Physical Exam   Constitutional: He appears well-developed and well-nourished  HENT:   Right Ear: Tympanic membrane normal    Left Ear: Tympanic membrane is erythematous  A middle ear effusion is present  Nose: Mucosal edema present  No rhinorrhea  Right sinus exhibits no maxillary sinus tenderness  Left sinus exhibits no maxillary sinus tenderness     Mouth/Throat: Posterior oropharyngeal erythema present  No oropharyngeal exudate or posterior oropharyngeal edema  Cardiovascular: Normal rate, regular rhythm and normal heart sounds  Pulmonary/Chest: Breath sounds normal    Lymphadenopathy:     He has cervical adenopathy

## 2019-01-14 ENCOUNTER — TELEPHONE (OUTPATIENT)
Dept: FAMILY MEDICINE CLINIC | Facility: CLINIC | Age: 44
End: 2019-01-14

## 2019-01-14 DIAGNOSIS — J01.91 ACUTE RECURRENT SINUSITIS, UNSPECIFIED LOCATION: Primary | ICD-10-CM

## 2019-01-14 RX ORDER — AMOXICILLIN AND CLAVULANATE POTASSIUM 400; 57 MG/5ML; MG/5ML
10 POWDER, FOR SUSPENSION ORAL 2 TIMES DAILY
Qty: 200 ML | Refills: 0 | Status: SHIPPED | OUTPATIENT
Start: 2019-01-14 | End: 2019-01-24

## 2019-01-14 NOTE — TELEPHONE ENCOUNTER
Pharmacy called and stated that they do not offer this dose of anbx in solution, please call in an alternative anbx for the patient

## 2019-01-16 ENCOUNTER — TELEPHONE (OUTPATIENT)
Dept: NEPHROLOGY | Facility: CLINIC | Age: 44
End: 2019-01-16

## 2019-01-16 NOTE — TELEPHONE ENCOUNTER
Efrain Bradley called in requesting lab results from 12/24/18  I'm not sure if you had a chance to review his results yet  Results are in EPIC

## 2019-01-24 ENCOUNTER — TELEPHONE (OUTPATIENT)
Dept: NEPHROLOGY | Facility: CLINIC | Age: 44
End: 2019-01-24

## 2019-01-24 DIAGNOSIS — R80.9 PROTEINURIA, UNSPECIFIED TYPE: Primary | ICD-10-CM

## 2019-02-07 ENCOUNTER — TELEPHONE (OUTPATIENT)
Dept: NEPHROLOGY | Facility: CLINIC | Age: 44
End: 2019-02-07

## 2019-02-07 NOTE — TELEPHONE ENCOUNTER
Pete Coates just called  He stated that he was admitted yesterday  They removed his appendix  They want him to get a CT scan done of his stomach  He said that he is getting an MRI done for you in like two weeks  He is wondering if the CT Scan will be enough or do you want him to do the whole process of the MRI  Patient is requesting a phone call back from one of us  He is at [facility], [facility]  Patients call back number is [phone]  Patient has a follow up appointment with you   3/13/19 at 930 am in [address] [place] [address]  Please advise

## 2019-02-08 ENCOUNTER — TELEPHONE (OUTPATIENT)
Dept: FAMILY MEDICINE CLINIC | Facility: CLINIC | Age: 44
End: 2019-02-08

## 2019-02-08 NOTE — TELEPHONE ENCOUNTER
Davonte Caballero for [facility] called to confirm reason for surgery   Davonte Caballero was advised pt had Appendectomy

## 2019-02-18 NOTE — NURSING NOTE
Spoke with patient and phone consult / pre instructions for kidney biopsy scheduled for 2/21/19 completed  Patient verbalizes understanding of NPO status ,  needed , potential to stay overnight if any complications, day and campus of procedure and aware he will receive phone call night prior to procedure with arrival time

## 2019-02-21 ENCOUNTER — HOSPITAL ENCOUNTER (OUTPATIENT)
Dept: RADIOLOGY | Facility: HOSPITAL | Age: 44
Discharge: HOME | End: 2019-02-21
Attending: INTERNAL MEDICINE | Admitting: RADIOLOGY
Payer: COMMERCIAL

## 2019-02-21 VITALS
BODY MASS INDEX: 27.79 KG/M2 | DIASTOLIC BLOOD PRESSURE: 84 MMHG | SYSTOLIC BLOOD PRESSURE: 129 MMHG | WEIGHT: 151 LBS | HEIGHT: 62 IN | RESPIRATION RATE: 16 BRPM | OXYGEN SATURATION: 98 % | HEART RATE: 95 BPM | TEMPERATURE: 97.7 F

## 2019-02-21 DIAGNOSIS — R80.9 PROTEINURIA, UNSPECIFIED TYPE: ICD-10-CM

## 2019-02-21 LAB
ANION GAP SERPL CALCULATED.3IONS-SCNC: 5 MMOL/L (ref 4–13)
BUN SERPL-MCNC: 8 MG/DL (ref 5–25)
CALCIUM SERPL-MCNC: 8.7 MG/DL (ref 8.3–10.1)
CHLORIDE SERPL-SCNC: 108 MMOL/L (ref 100–108)
CO2 SERPL-SCNC: 27 MMOL/L (ref 21–32)
CREAT SERPL-MCNC: 0.78 MG/DL (ref 0.6–1.3)
ERYTHROCYTE [DISTWIDTH] IN BLOOD BY AUTOMATED COUNT: 12.3 % (ref 11.6–15.1)
GFR SERPL CREATININE-BSD FRML MDRD: 130 ML/MIN/1.73SQ M
GLUCOSE P FAST SERPL-MCNC: 85 MG/DL (ref 65–99)
GLUCOSE SERPL-MCNC: 85 MG/DL (ref 65–140)
HCT VFR BLD AUTO: 43.3 % (ref 36.5–49.3)
HGB BLD-MCNC: 14.3 G/DL (ref 12–17)
INR PPP: 0.9 (ref 0.86–1.17)
MCH RBC QN AUTO: 28.8 PG (ref 26.8–34.3)
MCHC RBC AUTO-ENTMCNC: 33 G/DL (ref 31.4–37.4)
MCV RBC AUTO: 87 FL (ref 82–98)
PLATELET # BLD AUTO: 188 THOUSANDS/UL (ref 149–390)
PMV BLD AUTO: 11.1 FL (ref 8.9–12.7)
POTASSIUM SERPL-SCNC: 3.9 MMOL/L (ref 3.5–5.3)
PROTHROMBIN TIME: 12.3 SECONDS (ref 11.8–14.2)
RBC # BLD AUTO: 4.96 MILLION/UL (ref 3.88–5.62)
SODIUM SERPL-SCNC: 140 MMOL/L (ref 136–145)
WBC # BLD AUTO: 4.53 THOUSAND/UL (ref 4.31–10.16)

## 2019-02-21 PROCEDURE — 80048 BASIC METABOLIC PNL TOTAL CA: CPT | Performed by: RADIOLOGY

## 2019-02-21 PROCEDURE — 88305 TISSUE EXAM BY PATHOLOGIST: CPT | Performed by: INTERNAL MEDICINE

## 2019-02-21 PROCEDURE — 85610 PROTHROMBIN TIME: CPT | Performed by: RADIOLOGY

## 2019-02-21 PROCEDURE — 50200 RENAL BIOPSY PERQ: CPT

## 2019-02-21 PROCEDURE — 85027 COMPLETE CBC AUTOMATED: CPT | Performed by: RADIOLOGY

## 2019-02-21 PROCEDURE — 88350 IMFLUOR EA ADDL 1ANTB STN PX: CPT | Performed by: INTERNAL MEDICINE

## 2019-02-21 PROCEDURE — 99152 MOD SED SAME PHYS/QHP 5/>YRS: CPT | Performed by: RADIOLOGY

## 2019-02-21 PROCEDURE — 88346 IMFLUOR 1ST 1ANTB STAIN PX: CPT | Performed by: INTERNAL MEDICINE

## 2019-02-21 PROCEDURE — 50200 RENAL BIOPSY PERQ: CPT | Performed by: RADIOLOGY

## 2019-02-21 PROCEDURE — 77012 CT SCAN FOR NEEDLE BIOPSY: CPT

## 2019-02-21 PROCEDURE — 88348 ELECTRON MICROSCOPY DX: CPT | Performed by: INTERNAL MEDICINE

## 2019-02-21 PROCEDURE — 88300 SURGICAL PATH GROSS: CPT | Performed by: PATHOLOGY

## 2019-02-21 PROCEDURE — 99152 MOD SED SAME PHYS/QHP 5/>YRS: CPT

## 2019-02-21 PROCEDURE — 88333 PATH CONSLTJ SURG CYTO XM 1: CPT | Performed by: PATHOLOGY

## 2019-02-21 PROCEDURE — 99153 MOD SED SAME PHYS/QHP EA: CPT

## 2019-02-21 PROCEDURE — 88313 SPECIAL STAINS GROUP 2: CPT | Performed by: INTERNAL MEDICINE

## 2019-02-21 PROCEDURE — 77012 CT SCAN FOR NEEDLE BIOPSY: CPT | Performed by: RADIOLOGY

## 2019-02-21 RX ORDER — MELATONIN
1000 WEEKLY
COMMUNITY
End: 2019-09-27 | Stop reason: ALTCHOICE

## 2019-02-21 RX ORDER — MIDAZOLAM HYDROCHLORIDE 1 MG/ML
INJECTION INTRAMUSCULAR; INTRAVENOUS CODE/TRAUMA/SEDATION MEDICATION
Status: COMPLETED | OUTPATIENT
Start: 2019-02-21 | End: 2019-02-21

## 2019-02-21 RX ORDER — SODIUM CHLORIDE 9 MG/ML
75 INJECTION, SOLUTION INTRAVENOUS CONTINUOUS
Status: DISCONTINUED | OUTPATIENT
Start: 2019-02-21 | End: 2019-02-21 | Stop reason: HOSPADM

## 2019-02-21 RX ORDER — FENTANYL CITRATE 50 UG/ML
INJECTION, SOLUTION INTRAMUSCULAR; INTRAVENOUS CODE/TRAUMA/SEDATION MEDICATION
Status: COMPLETED | OUTPATIENT
Start: 2019-02-21 | End: 2019-02-21

## 2019-02-21 RX ADMIN — MIDAZOLAM 1 MG: 1 INJECTION INTRAMUSCULAR; INTRAVENOUS at 08:21

## 2019-02-21 RX ADMIN — SODIUM CHLORIDE 75 ML/HR: 0.9 INJECTION, SOLUTION INTRAVENOUS at 07:23

## 2019-02-21 RX ADMIN — FENTANYL CITRATE 50 MCG: 50 INJECTION, SOLUTION INTRAMUSCULAR; INTRAVENOUS at 08:13

## 2019-02-21 RX ADMIN — FENTANYL CITRATE 50 MCG: 50 INJECTION, SOLUTION INTRAMUSCULAR; INTRAVENOUS at 08:21

## 2019-02-21 RX ADMIN — FENTANYL CITRATE 50 MCG: 50 INJECTION, SOLUTION INTRAMUSCULAR; INTRAVENOUS at 08:07

## 2019-02-21 RX ADMIN — MIDAZOLAM 1 MG: 1 INJECTION INTRAMUSCULAR; INTRAVENOUS at 08:07

## 2019-02-21 RX ADMIN — MIDAZOLAM 1 MG: 1 INJECTION INTRAMUSCULAR; INTRAVENOUS at 08:13

## 2019-02-21 NOTE — SEDATION DOCUMENTATION
At Patient starting snoring loudly and O2 sats declined in to 80"s    Non-rebreather placed with 15 L  Stats rebounded  Dr Sherry Trent in room and aware of situation  Patient then transferred back to IR Holding for obs  Patient slowly became more awake  Nasal cannula placed at 3L  Sats 100%  Then patient placed on room air, saturation level at 97% on RA  Patient awake  Dr Sherry Trent aware of patient progress

## 2019-02-21 NOTE — DISCHARGE INSTRUCTIONS
Percutaneous Kidney Biopsy   WHAT YOU NEED TO KNOW:   A percutaneous kidney biopsy is a procedure to remove a small sample of kidney tissue  It may also be done to check for kidney disease or cancer  DISCHARGE INSTRUCTIONS:   Follow up with your healthcare provider as directed:  Write down your questions so you remember to ask them during your visits  Wound care: The Band-Aid may be removed in 24 hours  For more information:   · National Kidney and Urologic Diseases Information Clearinghouse  [address] [place] [address]  Phone: 6- 715 - 725-7713  Web Address: http://kidney  niddk nih gov/   Care after your procedure:    1  Limit your activities for 36 hours after your biopsy  2  No driving day of biopsy  3  Return to your normal diet  Flat sips of flat soda helps with mild nausea  4  Remove band-aid or dressing 24 hours after procedure  Contact Interventional Radiology at [phone]    Helen PATIENTS: Contact Interventional Radiology at [phone]) Thais Whitehead PATIENTS: Contact Interventional Radiology at [phone]) if:    1  Difficulty breathing, nausea or vomiting  2  You feel weak or dizzy  3  Chills or fever above 101 degrees F  You have persistent nausea or vomiting    4  You have severe pain in your abdomen or where You feel weak or dizzy  your           procedure was done  5  You have blood in your urine  You urinate small amounts or not at all  4  Develop any redness, swelling, heat, unusual drainage, heavy bruising or bleeding     from biopsy site

## 2019-02-21 NOTE — BRIEF OP NOTE (RAD/CATH)
IR IMAGE GUIDED BIOPSY/ASPIRATION KIDNEY  Procedure Note    PATIENT NAME: Jenna Gomez  : 1975  MRN: [id number]     Pre-op Diagnosis:   1  Proteinuria, unspecified type      Post-op Diagnosis:   1  Proteinuria, unspecified type        Surgeon:   Gil Dukes MD    Estimated Blood Loss: 2 cc    Findings: Successful CT guided medical renal biopsy of inferior pole left renal cortex  Specimens: 3 core samples placed into [facility]      Complications:  None    Anesthesia: Conscious sedation and Local    Gil Dukes MD     Date: 2019  Time: 8:38 AM

## 2019-03-05 LAB — SCAN RESULT: NORMAL

## 2019-03-21 ENCOUNTER — TELEPHONE (OUTPATIENT)
Dept: FAMILY MEDICINE CLINIC | Facility: CLINIC | Age: 44
End: 2019-03-21

## 2019-03-21 NOTE — TELEPHONE ENCOUNTER
Called patient to schedule ER follow up but patient has an appointment coming up on 3/27/2019 with Dr Schwab Cones

## 2019-03-27 ENCOUNTER — OFFICE VISIT (OUTPATIENT)
Dept: FAMILY MEDICINE CLINIC | Facility: CLINIC | Age: 44
End: 2019-03-27
Payer: COMMERCIAL

## 2019-03-27 VITALS
RESPIRATION RATE: 16 BRPM | TEMPERATURE: 98.9 F | DIASTOLIC BLOOD PRESSURE: 88 MMHG | SYSTOLIC BLOOD PRESSURE: 126 MMHG | HEART RATE: 84 BPM | BODY MASS INDEX: 27.86 KG/M2 | HEIGHT: 62 IN | WEIGHT: 151.4 LBS

## 2019-03-27 DIAGNOSIS — R39.15 URINARY URGENCY: ICD-10-CM

## 2019-03-27 DIAGNOSIS — Z00.00 WELL ADULT EXAM: ICD-10-CM

## 2019-03-27 DIAGNOSIS — R74.8 INCREASED LIVER ENZYMES: Primary | ICD-10-CM

## 2019-03-27 PROBLEM — K35.80 ACUTE APPENDICITIS: Status: ACTIVE | Noted: 2019-02-06

## 2019-03-27 PROBLEM — K35.80 ACUTE APPENDICITIS: Status: RESOLVED | Noted: 2019-02-06 | Resolved: 2019-03-27

## 2019-03-27 PROCEDURE — 86580 TB INTRADERMAL TEST: CPT

## 2019-03-27 PROCEDURE — 99396 PREV VISIT EST AGE 40-64: CPT | Performed by: FAMILY MEDICINE

## 2019-03-27 NOTE — PROGRESS NOTES
Assessment/Plan     Healthy male exam      1  Here for work physical  2  Patient Counseling:  --Nutrition: Stressed importance of moderation in sodium/caffeine intake, saturated fat and cholesterol, caloric balance, sufficient intake of fresh fruits, vegetables, fiber  --Discussed the issue of  daily use of baby aspirin-not needed  --Exercise: Stressed the importance of regular exercise  --Substance Abuse: no concerns    --Sexuality:  --Injury prevention: Discussed safety belts, safety helmets, smoke detector  --Dental health: Discussed importance of regular tooth brushing, flossing, and dental visits  [facility]  --Immunizations reviewed  --After hours service discussed with patient    3  Discussed the patient's BMI with him  The BMI is in the acceptable range  4  Follow up in one year  Subjective     Wynell Aase is a 41 y o  male and is here for a comprehensive physical exam  The patient reports problems - protein in urine, elevated CPK  Do you take any herbs or supplements that were not prescribed by a doctor? no  Are you taking calcium supplements? no  Are you taking aspirin daily? no     History:  Patient receives prostate care outside our clinic  Date last prostate exam: none  Date last PSA: none    The following portions of the patient's history were reviewed and updated as appropriate: allergies, current medications, past family history, past medical history, past social history, past surgical history and problem list     Review of Systems  Do you have pain that bothers you in your daily life? yes  Gastrointestinal: positive for abdominal pain    : urinary urgency  Objective     /88 (BP Location: Right arm, Patient Position: Sitting, Cuff Size: Standard)   Pulse 84   Temp 98 9 °F (37 2 °C) (Temporal)   Resp 16   Ht 5' 2" (1 575 m)   Wt 68 7 kg (151 lb 6 4 oz)   BMI 27 69 kg/m²     General Appearance:    Alert, cooperative, no distress, appears stated age   Head: Normocephalic, without obvious abnormality, atraumatic   Eyes:    PERRL, conjunctiva/corneas clear, EOM's intact, fundi     benign, both eyes        Ears:    Normal TM's and external ear canals, both ears   Nose:   Nares normal, septum midline, mucosa normal, no drainage    or sinus tenderness   Throat:   Lips, mucosa, and tongue normal; teeth and gums normal   Neck:   Supple, symmetrical, trachea midline, no adenopathy;        thyroid:  No enlargement/tenderness/nodules; no carotid    bruit or JVD   Back:     Symmetric, no curvature, ROM normal, no CVA tenderness   Lungs:     Clear to auscultation bilaterally, respirations unlabored   Chest wall:    No tenderness or deformity   Heart:    Regular rate and rhythm, S1 and S2 normal, no murmur, rub   or gallop   Abdomen:     Soft, non-tender, bowel sounds active all four quadrants,     no masses, no organomegaly   Genitalia:     Rectal:     Extremities:   Extremities normal, atraumatic, no cyanosis or edema   Pulses:   2+ and symmetric all extremities   Skin:   Skin color, texture, turgor normal, no rashes or lesions   Lymph nodes:   Cervical, supraclavicular, and axillary nodes normal   Neurologic:   CNII-XII intact  Normal strength, sensation and reflexes       throughout

## 2019-03-29 ENCOUNTER — CLINICAL SUPPORT (OUTPATIENT)
Dept: FAMILY MEDICINE CLINIC | Facility: CLINIC | Age: 44
End: 2019-03-29

## 2019-03-29 DIAGNOSIS — Z11.1 SCREENING FOR TUBERCULOSIS: Primary | ICD-10-CM

## 2019-03-29 LAB
INDURATION: NORMAL MM
TB SKIN TEST: NEGATIVE

## 2019-03-29 NOTE — PROGRESS NOTES
PPD Reading Note  PPD read and results entered in [address]  Result: 0 mm induration    Interpretation: Negative  If test not read within 48-72 hours of initial placement, patient advised to repeat in other arm 1-3 weeks after this test   Allergic reaction: no

## 2019-04-10 DIAGNOSIS — J45.30 MILD PERSISTENT ASTHMA WITHOUT COMPLICATION: ICD-10-CM

## 2019-04-10 RX ORDER — ALBUTEROL SULFATE 90 MCG
HFA AEROSOL WITH ADAPTER (GRAM) INHALATION
Qty: 20.1 INHALER | Refills: 0 | Status: SHIPPED | OUTPATIENT
Start: 2019-04-10 | End: 2019-09-06 | Stop reason: SDUPTHER

## 2019-04-10 RX ORDER — ALBUTEROL SULFATE 2.5 MG/3ML
SOLUTION RESPIRATORY (INHALATION)
Qty: 75 ML | Refills: 0 | Status: SHIPPED | OUTPATIENT
Start: 2019-04-10 | End: 2019-09-27 | Stop reason: SDUPTHER

## 2019-04-19 LAB
ALBUMIN SERPL-MCNC: 3 G/DL (ref 3.6–5.1)
ALBUMIN/GLOB SERPL: 1.4 (CALC) (ref 1–2.5)
ALP SERPL-CCNC: 56 U/L (ref 40–115)
ALT SERPL-CCNC: 28 U/L (ref 9–46)
AST SERPL-CCNC: 25 U/L (ref 10–40)
BILIRUB SERPL-MCNC: 0.3 MG/DL (ref 0.2–1.2)
BUN SERPL-MCNC: 10 MG/DL (ref 7–25)
BUN/CREAT SERPL: ABNORMAL (CALC) (ref 6–22)
CALCIUM SERPL-MCNC: 8.6 MG/DL (ref 8.6–10.3)
CHLORIDE SERPL-SCNC: 108 MMOL/L (ref 98–110)
CO2 SERPL-SCNC: 26 MMOL/L (ref 20–32)
CREAT SERPL-MCNC: 1.06 MG/DL (ref 0.6–1.35)
GLOBULIN SER CALC-MCNC: 2.2 G/DL (CALC) (ref 1.9–3.7)
GLUCOSE SERPL-MCNC: 91 MG/DL (ref 65–99)
POTASSIUM SERPL-SCNC: 4.2 MMOL/L (ref 3.5–5.3)
PROT SERPL-MCNC: 5.2 G/DL (ref 6.1–8.1)
SL AMB EGFR AFRICAN AMERICAN: 101 ML/MIN/1.73M2
SL AMB EGFR NON AFRICAN AMERICAN: 87 ML/MIN/1.73M2
SODIUM SERPL-SCNC: 142 MMOL/L (ref 135–146)

## 2019-04-22 ENCOUNTER — TELEPHONE (OUTPATIENT)
Dept: FAMILY MEDICINE CLINIC | Facility: CLINIC | Age: 44
End: 2019-04-22

## 2019-04-24 ENCOUNTER — OFFICE VISIT (OUTPATIENT)
Dept: NEPHROLOGY | Facility: CLINIC | Age: 44
End: 2019-04-24
Payer: COMMERCIAL

## 2019-04-24 ENCOUNTER — TELEPHONE (OUTPATIENT)
Dept: NEPHROLOGY | Facility: CLINIC | Age: 44
End: 2019-04-24

## 2019-04-24 VITALS
SYSTOLIC BLOOD PRESSURE: 122 MMHG | HEART RATE: 80 BPM | HEIGHT: 63 IN | BODY MASS INDEX: 27.18 KG/M2 | DIASTOLIC BLOOD PRESSURE: 80 MMHG | WEIGHT: 153.4 LBS

## 2019-04-24 DIAGNOSIS — N02.2 MEMBRANOUS NEPHROPATHY DETERMINED BY BIOPSY: ICD-10-CM

## 2019-04-24 DIAGNOSIS — R80.8 OTHER PROTEINURIA: Primary | ICD-10-CM

## 2019-04-24 PROCEDURE — 99214 OFFICE O/P EST MOD 30 MIN: CPT | Performed by: INTERNAL MEDICINE

## 2019-04-24 RX ORDER — LOSARTAN POTASSIUM 25 MG/1
25 TABLET ORAL DAILY
Refills: 5 | COMMUNITY
Start: 2019-04-10 | End: 2019-06-24 | Stop reason: SDUPTHER

## 2019-04-24 RX ORDER — CYCLOSPORINE 100 MG/1
100 CAPSULE, LIQUID FILLED ORAL EVERY 12 HOURS SCHEDULED
Qty: 60 CAPSULE | Refills: 5 | Status: SHIPPED | OUTPATIENT
Start: 2019-04-24 | End: 2019-05-08 | Stop reason: CLARIF

## 2019-05-02 ENCOUNTER — TELEPHONE (OUTPATIENT)
Dept: NEPHROLOGY | Facility: CLINIC | Age: 44
End: 2019-05-02

## 2019-05-06 ENCOUNTER — TELEPHONE (OUTPATIENT)
Dept: FAMILY MEDICINE CLINIC | Facility: CLINIC | Age: 44
End: 2019-05-06

## 2019-05-06 DIAGNOSIS — I10 ESSENTIAL HYPERTENSION: Primary | ICD-10-CM

## 2019-05-07 RX ORDER — AMLODIPINE BESYLATE 5 MG/1
5 TABLET ORAL DAILY
Qty: 30 TABLET | Refills: 5 | Status: SHIPPED | OUTPATIENT
Start: 2019-05-07 | End: 2019-09-27 | Stop reason: CLARIF

## 2019-05-08 DIAGNOSIS — N05.2 MEMBRANOUS GLOMERULONEPHRITIS: Primary | ICD-10-CM

## 2019-05-09 ENCOUNTER — OFFICE VISIT (OUTPATIENT)
Dept: FAMILY MEDICINE CLINIC | Facility: CLINIC | Age: 44
End: 2019-05-09
Payer: COMMERCIAL

## 2019-05-09 VITALS
HEART RATE: 76 BPM | TEMPERATURE: 98.5 F | HEIGHT: 63 IN | WEIGHT: 151.4 LBS | DIASTOLIC BLOOD PRESSURE: 96 MMHG | BODY MASS INDEX: 26.82 KG/M2 | SYSTOLIC BLOOD PRESSURE: 150 MMHG | RESPIRATION RATE: 16 BRPM

## 2019-05-09 DIAGNOSIS — I10 ESSENTIAL HYPERTENSION: Primary | ICD-10-CM

## 2019-05-09 PROBLEM — R03.0 ELEVATED BP WITHOUT DIAGNOSIS OF HYPERTENSION: Status: RESOLVED | Noted: 2018-09-17 | Resolved: 2019-05-09

## 2019-05-09 PROCEDURE — 1036F TOBACCO NON-USER: CPT | Performed by: FAMILY MEDICINE

## 2019-05-09 PROCEDURE — 99213 OFFICE O/P EST LOW 20 MIN: CPT | Performed by: FAMILY MEDICINE

## 2019-05-09 PROCEDURE — 3008F BODY MASS INDEX DOCD: CPT | Performed by: FAMILY MEDICINE

## 2019-05-21 LAB
BUN SERPL-MCNC: 14 MG/DL (ref 7–25)
BUN/CREAT SERPL: ABNORMAL (CALC) (ref 6–22)
CALCIUM SERPL-MCNC: 8.5 MG/DL (ref 8.6–10.3)
CHLORIDE SERPL-SCNC: 107 MMOL/L (ref 98–110)
CO2 SERPL-SCNC: 29 MMOL/L (ref 20–32)
CREAT SERPL-MCNC: 1.02 MG/DL (ref 0.6–1.35)
CREAT UR-MCNC: 106 MG/DL (ref 20–320)
GLUCOSE SERPL-MCNC: 85 MG/DL (ref 65–99)
POTASSIUM SERPL-SCNC: 4 MMOL/L (ref 3.5–5.3)
PROT UR-MCNC: 359 MG/DL (ref 5–25)
PROT/CREAT UR: 3387 MG/G CREAT (ref 22–128)
SL AMB EGFR AFRICAN AMERICAN: 105 ML/MIN/1.73M2
SL AMB EGFR NON AFRICAN AMERICAN: 91 ML/MIN/1.73M2
SODIUM SERPL-SCNC: 139 MMOL/L (ref 135–146)

## 2019-06-05 ENCOUNTER — TELEPHONE (OUTPATIENT)
Dept: NEPHROLOGY | Facility: CLINIC | Age: 44
End: 2019-06-05

## 2019-06-05 DIAGNOSIS — I10 ESSENTIAL HYPERTENSION: ICD-10-CM

## 2019-06-05 DIAGNOSIS — R80.1 PERSISTENT PROTEINURIA: ICD-10-CM

## 2019-06-05 DIAGNOSIS — R80.8 OTHER PROTEINURIA: ICD-10-CM

## 2019-06-05 DIAGNOSIS — N05.2 MEMBRANOUS GLOMERULONEPHRITIS: Primary | ICD-10-CM

## 2019-06-05 DIAGNOSIS — R80.9 PROTEINURIA, UNSPECIFIED TYPE: ICD-10-CM

## 2019-06-12 ENCOUNTER — TELEPHONE (OUTPATIENT)
Dept: NEPHROLOGY | Facility: CLINIC | Age: 44
End: 2019-06-12

## 2019-06-24 DIAGNOSIS — N05.2 MEMBRANOUS GLOMERULONEPHRITIS: ICD-10-CM

## 2019-06-24 RX ORDER — LOSARTAN POTASSIUM 25 MG/1
25 TABLET ORAL DAILY
Qty: 30 TABLET | Refills: 5 | Status: SHIPPED | OUTPATIENT
Start: 2019-06-24 | End: 2019-11-18 | Stop reason: SDUPTHER

## 2019-06-28 LAB
BUN SERPL-MCNC: 13 MG/DL (ref 7–25)
BUN/CREAT SERPL: NORMAL (CALC) (ref 6–22)
CALCIUM SERPL-MCNC: 8.8 MG/DL (ref 8.6–10.3)
CHLORIDE SERPL-SCNC: 105 MMOL/L (ref 98–110)
CO2 SERPL-SCNC: 29 MMOL/L (ref 20–32)
CREAT SERPL-MCNC: 0.91 MG/DL (ref 0.6–1.35)
CREAT UR-MCNC: 82 MG/DL (ref 20–320)
GLUCOSE SERPL-MCNC: 74 MG/DL (ref 65–99)
POTASSIUM SERPL-SCNC: 4.2 MMOL/L (ref 3.5–5.3)
PROT UR-MCNC: 237 MG/DL (ref 5–25)
PROT/CREAT UR: 2890 MG/G CREAT (ref 22–128)
SL AMB EGFR AFRICAN AMERICAN: 121 ML/MIN/1.73M2
SL AMB EGFR NON AFRICAN AMERICAN: 104 ML/MIN/1.73M2
SODIUM SERPL-SCNC: 138 MMOL/L (ref 135–146)

## 2019-07-01 ENCOUNTER — TELEPHONE (OUTPATIENT)
Dept: NEPHROLOGY | Facility: CLINIC | Age: 44
End: 2019-07-01

## 2019-07-01 DIAGNOSIS — R03.0 ELEVATED BP WITHOUT DIAGNOSIS OF HYPERTENSION: ICD-10-CM

## 2019-07-01 DIAGNOSIS — I10 ESSENTIAL HYPERTENSION: ICD-10-CM

## 2019-07-01 DIAGNOSIS — N02.2 MEMBRANOUS NEPHROPATHY DETERMINED BY BIOPSY: ICD-10-CM

## 2019-07-01 DIAGNOSIS — R80.9 PROTEINURIA, UNSPECIFIED TYPE: ICD-10-CM

## 2019-07-01 DIAGNOSIS — R80.1 PERSISTENT PROTEINURIA: ICD-10-CM

## 2019-07-01 DIAGNOSIS — R80.8 OTHER PROTEINURIA: ICD-10-CM

## 2019-07-01 DIAGNOSIS — N05.2 MEMBRANOUS GLOMERULONEPHRITIS: Primary | ICD-10-CM

## 2019-07-01 NOTE — TELEPHONE ENCOUNTER
Left message for the patient to call back in regards to his recent lab test results and instructions from Dr Radha Walter MA

## 2019-07-01 NOTE — TELEPHONE ENCOUNTER
----- Message from Trevon Warren MD sent at 7/1/2019 10:43 AM EDT -----  Call and let patient know that urine protein level is down slightly but still around 2 5 g  Kidney function still remains normal   Tell him I know I am seeing him at the beginning of August and you can mail him slip to repeat urine protein to creatinine ratio at that time in BMP  Also for lab work before his August visit please have him do the lab for PLA2R antibodies ( anti phospholipase A2 receptor)  prior to that visit    This is a special tests and you may need to call the lab on how to order it     ----- Message -----  From: Estefania Cornejo Lab Results In  Sent: 6/27/2019   6:30 PM EDT  To: Trevon Warren MD

## 2019-07-01 NOTE — TELEPHONE ENCOUNTER
Spoke with the patient and he is aware of his lab test results and that he needs additional lab test completed before his 8/1/19 appointment and the lab slips have been mailed out for the patient,        Indra Burgos MA

## 2019-07-25 LAB
BUN SERPL-MCNC: 10 MG/DL (ref 7–25)
BUN/CREAT SERPL: NORMAL (CALC) (ref 6–22)
CALCIUM SERPL-MCNC: 8.6 MG/DL (ref 8.6–10.3)
CHLORIDE SERPL-SCNC: 107 MMOL/L (ref 98–110)
CO2 SERPL-SCNC: 26 MMOL/L (ref 20–32)
CREAT SERPL-MCNC: 0.97 MG/DL (ref 0.6–1.35)
CREAT UR-MCNC: 153 MG/DL (ref 20–320)
GLUCOSE SERPL-MCNC: 88 MG/DL (ref 65–99)
PLA2R IGG SER IA-ACNC: 4 RU/ML
PLA2R IGG SERPL QL IF: POSITIVE
POTASSIUM SERPL-SCNC: 4.3 MMOL/L (ref 3.5–5.3)
PROT UR-MCNC: 218 MG/DL (ref 5–25)
PROT/CREAT UR: 1425 MG/G CREAT (ref 22–128)
SL AMB EGFR AFRICAN AMERICAN: 112 ML/MIN/1.73M2
SL AMB EGFR NON AFRICAN AMERICAN: 97 ML/MIN/1.73M2
SODIUM SERPL-SCNC: 139 MMOL/L (ref 135–146)

## 2019-08-01 ENCOUNTER — OFFICE VISIT (OUTPATIENT)
Dept: NEPHROLOGY | Facility: CLINIC | Age: 44
End: 2019-08-01
Payer: COMMERCIAL

## 2019-08-01 VITALS
WEIGHT: 150.4 LBS | BODY MASS INDEX: 26.65 KG/M2 | DIASTOLIC BLOOD PRESSURE: 82 MMHG | SYSTOLIC BLOOD PRESSURE: 134 MMHG | HEIGHT: 63 IN | RESPIRATION RATE: 16 BRPM | HEART RATE: 80 BPM

## 2019-08-01 DIAGNOSIS — N02.2 MEMBRANOUS NEPHROPATHY DETERMINED BY BIOPSY: Primary | ICD-10-CM

## 2019-08-01 DIAGNOSIS — I10 ESSENTIAL HYPERTENSION: ICD-10-CM

## 2019-08-01 DIAGNOSIS — R80.8 OTHER PROTEINURIA: ICD-10-CM

## 2019-08-01 DIAGNOSIS — N05.2 MEMBRANOUS GLOMERULONEPHRITIS, STAGE 1: Primary | ICD-10-CM

## 2019-08-01 PROCEDURE — 3075F SYST BP GE 130 - 139MM HG: CPT | Performed by: INTERNAL MEDICINE

## 2019-08-01 PROCEDURE — 3079F DIAST BP 80-89 MM HG: CPT | Performed by: INTERNAL MEDICINE

## 2019-08-01 PROCEDURE — 99214 OFFICE O/P EST MOD 30 MIN: CPT | Performed by: INTERNAL MEDICINE

## 2019-08-01 NOTE — PROGRESS NOTES
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 it is primary membranous given positive for PLA2R antibodies on staining of the specimen  Creatinine remains normal at 0 9 so there has been no decline in his kidney function  I was pleased to see that his protein estimation has gone down significantly to 1 4 g from over 3 5 g so he is responding to the cyclosporin  Also his antibody titers are negative so that would suggest less disease activity  At this point were going to continue with cyclosporin treatment at least for 1-2 years monitor protein excretions if they continue to decline there may come a time we would discontinue the medication as some patient spontaneously developed remission from this condition  The patient was given the news that the protein excretion is lower renal functions normal   We are going to do urine protein estimation and labs long term and then prior to his visit and I will contact him with results  Continue his ARB as his blood pressure is under good control and also helps reduce proteinuria  SUBJECTIVE:  Review of Systems   Constitution: Negative for chills and fever  HENT: Negative  Eyes: Negative  Cardiovascular: Negative  Negative for chest pain, dyspnea on exertion, leg swelling and orthopnea  Respiratory: Negative  Negative for cough, shortness of breath and wheezing  Gastrointestinal: Negative  Negative for abdominal pain, diarrhea, nausea and vomiting  Genitourinary: Negative  Negative for dysuria, hematuria and incomplete emptying  Neurological: Negative  Psychiatric/Behavioral: Negative for altered mental status  OBJECTIVE:  Current Weight: Weight - Scale: 68 2 kg (150 lb 6 4 oz)  [email or web address]:     Blood pressure 134/82, pulse 80, resp  rate 16, height 5' 3" (1 6 m), weight 68 2 kg (150 lb 6 4 oz)  , Body mass index is 26 64 kg/m²  [unfilled]    Physical Exam: /82 (BP Location: Left arm, Patient Position: Sitting, Cuff Size: Standard)   Pulse 80   Resp 16   Ht 5' 3" (1 6 m)   Wt 68 2 kg (150 lb 6 4 oz)   BMI 26 64 kg/m²   Physical Exam   Constitutional: He is oriented to person, place, and time  He appears well-nourished  No distress  HENT:   Head: Atraumatic  Eyes: No scleral icterus  Neck: Neck supple  No JVD present  Cardiovascular: Normal rate and regular rhythm  Exam reveals no friction rub  No edema  Pulmonary/Chest: Effort normal and breath sounds normal  No respiratory distress  He has no wheezes  He has no rales  Abdominal: Soft  Bowel sounds are normal  He exhibits no distension  There is no tenderness  Musculoskeletal: He exhibits no edema  Neurological: He is alert and oriented to person, place, and time  Psychiatric: He has a normal mood and affect         Medications:    Current Outpatient Medications:     albuterol (2 5 mg/3 mL) 0 083 % nebulizer solution, USE 1 VIAL IN THE NEBULIZER EVERY 6 HOURS AS NEEDED FORM WHEEZE OR SHORTNESS OF BREATH, Disp: 75 mL, Rfl: 0    amLODIPine (NORVASC) 5 mg tablet, Take 1 tablet (5 mg total) by mouth daily, Disp: 30 tablet, Rfl: 5    cholecalciferol (VITAMIN D3) 1,000 units tablet, Take 1,000 Units by mouth once a week , Disp: , Rfl:     cycloSPORINE (SandIMMUNE) 100 MG/ML solution, Take 1 mL (100 mg total) by mouth every 12 (twelve) hours, Disp: 60 mL, Rfl: 5    losartan (COZAAR) 25 mg tablet, Take 1 tablet (25 mg total) by mouth daily, Disp: 30 tablet, Rfl: 5    mometasone-formoterol (DULERA) 200-5 MCG/ACT inhaler, Inhale 2 puffs 2 (two) times a day Rinse mouth after use , Disp: 1 Inhaler, Rfl: 0    PROVENTIL  (90 Base) MCG/ACT inhaler, TAKE 2 PUFFS BY MOUTH 4 TIMES A DAY (Patient not taking: Reported on 8/1/2019), Disp: 20 1 Inhaler, Rfl: 0    Laboratory Results:  Lab Results   Component Value Date    WBC 4 53 02/21/2019    HGB 14 3 02/21/2019    HCT 43 3 02/21/2019    MCV 87 02/21/2019     02/21/2019     Lab Results   Component Value Date    SODIUM 139 07/22/2019    K 4 3 07/22/2019     07/22/2019    CO2 26 07/22/2019    BUN 10 07/22/2019    CREATININE 0 97 07/22/2019    GLUC 88 07/22/2019    CALCIUM 8 6 07/22/2019     Lab Results   Component Value Date    CALCIUM 8 6 07/22/2019     No results found for: LABPROT

## 2019-08-01 NOTE — LETTER
August 1, 2019     Linda Linda MD  [address] [place] [address] [address]    Patient: Victor M Armenta   YOB: 1975   Date of Visit: 8/1/2019       Dear Dr Ashwini Wilson:    Thank you for referring Donna Morgan to me for evaluation  Below are my notes for this consultation  If you have questions, please do not hesitate to call me  I look forward to following your patient along with you  Sincerely,        Wesley Walker MD        CC: No Recipients  Wesley Walker MD  8/1/2019 10:39 AM  Sign at close encounter  NEPHROLOGY PROGRESS NOTE    Victor M Armenta 41 y o  male MRN: [id number]  Unit/Bed#:  Encounter: [id number]  Reason for Consult:  Membranous GN    Patient looks great feeling well says his health been good no swelling or other complaints  He went on vacation had a very nice time  He is tolerating the medication without any complications  ASSESSMENT/PLAN:  1  Renal  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 in it is primary membranous given positive for PLA2R antibodies on staining of the specimen  Creatinine remains normal at 0 9 so there has been no decline in his kidney function  I was pleased to see that his protein estimation has gone down significantly to 1 4 g from over 3 5 g so he is responding to the cyclosporin  Also his antibody titers are negative so that would suggest less disease activity  At this point were going to continue with cyclosporin treatment at least for 1-2 years monitor protein excretions if they continue to decline there may come a time we would discontinue the medication as some patient spontaneously developed remission from this condition  The patient was given the news that the protein excretion is lower renal functions normal   We are going to do urine protein estimation and labs [facility] and then prior to his visit and I will contact him with results    Continue his ARB as his blood pressure is under good control and also helps reduce proteinuria  SUBJECTIVE:  Review of Systems   Constitution: Negative for chills and fever  HENT: Negative  Eyes: Negative  Cardiovascular: Negative  Negative for chest pain, dyspnea on exertion, leg swelling and orthopnea  Respiratory: Negative  Negative for cough, shortness of breath and wheezing  Gastrointestinal: Negative  Negative for abdominal pain, diarrhea, nausea and vomiting  Genitourinary: Negative  Negative for dysuria, hematuria and incomplete emptying  Neurological: Negative  Psychiatric/Behavioral: Negative for altered mental status  OBJECTIVE:  Current Weight: Weight - Scale: 68 2 kg (150 lb 6 4 oz)  [email or web address]:     Blood pressure 134/82, pulse 80, resp  rate 16, height 5' 3" (1 6 m), weight 68 2 kg (150 lb 6 4 oz)  , Body mass index is 26 64 kg/m²  [unfilled]    Physical Exam: /82 (BP Location: Left arm, Patient Position: Sitting, Cuff Size: Standard)   Pulse 80   Resp 16   Ht 5' 3" (1 6 m)   Wt 68 2 kg (150 lb 6 4 oz)   BMI 26 64 kg/m²    Physical Exam   Constitutional: He is oriented to person, place, and time  He appears well-nourished  No distress  HENT:   Head: Atraumatic  Eyes: No scleral icterus  Neck: Neck supple  No JVD present  Cardiovascular: Normal rate and regular rhythm  Exam reveals no friction rub  No edema  Pulmonary/Chest: Effort normal and breath sounds normal  No respiratory distress  He has no wheezes  He has no rales  Abdominal: Soft  Bowel sounds are normal  He exhibits no distension  There is no tenderness  Musculoskeletal: He exhibits no edema  Neurological: He is alert and oriented to person, place, and time  Psychiatric: He has a normal mood and affect         Medications:    Current Outpatient Medications:     albuterol (2 5 mg/3 mL) 0 083 % nebulizer solution, USE 1 VIAL IN THE NEBULIZER EVERY 6 HOURS AS NEEDED FORM WHEEZE OR SHORTNESS OF BREATH, Disp: 75 mL, Rfl: 0    amLODIPine (NORVASC) 5 mg tablet, Take 1 tablet (5 mg total) by mouth daily, Disp: 30 tablet, Rfl: 5    cholecalciferol (VITAMIN D3) 1,000 units tablet, Take 1,000 Units by mouth once a week , Disp: , Rfl:     cycloSPORINE (SandIMMUNE) 100 MG/ML solution, Take 1 mL (100 mg total) by mouth every 12 (twelve) hours, Disp: 60 mL, Rfl: 5    losartan (COZAAR) 25 mg tablet, Take 1 tablet (25 mg total) by mouth daily, Disp: 30 tablet, Rfl: 5    mometasone-formoterol (DULERA) 200-5 MCG/ACT inhaler, Inhale 2 puffs 2 (two) times a day Rinse mouth after use , Disp: 1 Inhaler, Rfl: 0    PROVENTIL  (90 Base) MCG/ACT inhaler, TAKE 2 PUFFS BY MOUTH 4 TIMES A DAY (Patient not taking: Reported on 8/1/2019), Disp: 20 1 Inhaler, Rfl: 0    Laboratory Results:  Lab Results   Component Value Date    WBC 4 53 02/21/2019    HGB 14 3 02/21/2019    HCT 43 3 02/21/2019    MCV 87 02/21/2019     02/21/2019     Lab Results   Component Value Date    SODIUM 139 07/22/2019    K 4 3 07/22/2019     07/22/2019    CO2 26 07/22/2019    BUN 10 07/22/2019    CREATININE 0 97 07/22/2019    GLUC 88 07/22/2019    CALCIUM 8 6 07/22/2019     Lab Results   Component Value Date    CALCIUM 8 6 07/22/2019     No results found for: LABPROT

## 2019-09-06 DIAGNOSIS — J45.40 MODERATE PERSISTENT ASTHMA, UNSPECIFIED WHETHER COMPLICATED: ICD-10-CM

## 2019-09-06 DIAGNOSIS — J45.30 MILD PERSISTENT ASTHMA WITHOUT COMPLICATION: ICD-10-CM

## 2019-09-06 RX ORDER — ALBUTEROL SULFATE 90 UG/1
AEROSOL, METERED RESPIRATORY (INHALATION)
Qty: 8.5 INHALER | Refills: 5 | Status: SHIPPED | OUTPATIENT
Start: 2019-09-06 | End: 2019-09-27 | Stop reason: SDUPTHER

## 2019-09-06 RX ORDER — MOMETASONE FUROATE AND FORMOTEROL FUMARATE DIHYDRATE 200; 5 UG/1; UG/1
AEROSOL RESPIRATORY (INHALATION)
Qty: 1 INHALER | Refills: 5 | Status: SHIPPED | OUTPATIENT
Start: 2019-09-06 | End: 2019-09-27 | Stop reason: SDUPTHER

## 2019-09-27 ENCOUNTER — OFFICE VISIT (OUTPATIENT)
Dept: FAMILY MEDICINE CLINIC | Facility: CLINIC | Age: 44
End: 2019-09-27
Payer: COMMERCIAL

## 2019-09-27 VITALS
WEIGHT: 149.8 LBS | BODY MASS INDEX: 26.54 KG/M2 | HEIGHT: 63 IN | HEART RATE: 76 BPM | DIASTOLIC BLOOD PRESSURE: 62 MMHG | SYSTOLIC BLOOD PRESSURE: 122 MMHG

## 2019-09-27 DIAGNOSIS — J45.30 MILD PERSISTENT ASTHMA WITHOUT COMPLICATION: ICD-10-CM

## 2019-09-27 DIAGNOSIS — J45.40 MODERATE PERSISTENT ASTHMA, UNSPECIFIED WHETHER COMPLICATED: ICD-10-CM

## 2019-09-27 DIAGNOSIS — I10 ESSENTIAL HYPERTENSION: Primary | ICD-10-CM

## 2019-09-27 PROCEDURE — 3078F DIAST BP <80 MM HG: CPT | Performed by: FAMILY MEDICINE

## 2019-09-27 PROCEDURE — 3074F SYST BP LT 130 MM HG: CPT | Performed by: FAMILY MEDICINE

## 2019-09-27 PROCEDURE — 99213 OFFICE O/P EST LOW 20 MIN: CPT | Performed by: FAMILY MEDICINE

## 2019-09-27 PROCEDURE — 3008F BODY MASS INDEX DOCD: CPT | Performed by: FAMILY MEDICINE

## 2019-09-27 RX ORDER — ALBUTEROL SULFATE 2.5 MG/3ML
2.5 SOLUTION RESPIRATORY (INHALATION) EVERY 4 HOURS PRN
Qty: 75 ML | Refills: 5 | Status: SHIPPED | OUTPATIENT
Start: 2019-09-27 | End: 2020-03-27 | Stop reason: SDUPTHER

## 2019-09-27 RX ORDER — ALBUTEROL SULFATE 90 UG/1
2 AEROSOL, METERED RESPIRATORY (INHALATION) EVERY 4 HOURS PRN
Qty: 8.5 INHALER | Refills: 5 | Status: SHIPPED | OUTPATIENT
Start: 2019-09-27 | End: 2020-03-27 | Stop reason: SDUPTHER

## 2019-09-27 RX ORDER — ERGOCALCIFEROL 1.25 MG/1
50000 CAPSULE ORAL WEEKLY
Refills: 11 | COMMUNITY
Start: 2019-08-29 | End: 2021-11-26 | Stop reason: ALTCHOICE

## 2019-09-27 NOTE — PROGRESS NOTES
Assessment and Plan:    Problem List Items Addressed This Visit        Respiratory    Moderate persistent asthma     Refill med         Relevant Medications    albuterol (2 5 mg/3 mL) 0 083 % nebulizer solution    albuterol (PROVENTIL HFA,VENTOLIN HFA) 90 mcg/act inhaler    mometasone-formoterol (DULERA) 200-5 MCG/ACT inhaler       Cardiovascular and Mediastinum    Essential hypertension - Primary     BP stable when sticks to diet           Other Visit Diagnoses     Mild persistent asthma without complication        Relevant Medications    albuterol (2 5 mg/3 mL) 0 083 % nebulizer solution    albuterol (PROVENTIL HFA,VENTOLIN HFA) 90 mcg/act inhaler    mometasone-formoterol (DULERA) 200-5 MCG/ACT inhaler                 Diagnoses and all orders for this visit:    Essential hypertension    Mild persistent asthma without complication  -     albuterol (2 5 mg/3 mL) 0 083 % nebulizer solution; Take 1 vial (2 5 mg total) by nebulization every 4 (four) hours as needed for wheezing or shortness of breath  -     albuterol (PROVENTIL HFA,VENTOLIN HFA) 90 mcg/act inhaler; Inhale 2 puffs every 4 (four) hours as needed for wheezing    Moderate persistent asthma, unspecified whether complicated  -     mometasone-formoterol (DULERA) 200-5 MCG/ACT inhaler; Inhale 2 puffs 2 (two) times a day Rinse mouth after use  Other orders  -     ergocalciferol (VITAMIN D2) 50,000 units; Take 50,000 Units by mouth once a week              Subjective:      Patient ID: Rehan Lakhani is a 45 y o  male      CC:    Chief Complaint   Patient presents with    Follow-up     Routine follow up  kw    Asthma       HPI:    Here for f/u BP=protein improving on cyclosporine, asthma doing fairly well, nephrologist thought he heard irregular heartbeat, occ palpitations      The following portions of the patient's history were reviewed and updated as appropriate: allergies, current medications, past family history, past medical history, past social history, past surgical history and problem list       Review of Systems   Constitutional: Negative for activity change, appetite change and unexpected weight change  Respiratory: Negative for shortness of breath  Cardiovascular: Positive for palpitations  Negative for chest pain  Neurological: Negative for dizziness and headaches  Data to review:       Objective:    Vitals:    09/27/19 1127   BP: 122/62   BP Location: Left arm   Patient Position: Sitting   Pulse: 76   Weight: 67 9 kg (149 lb 12 8 oz)   Height: 5' 3" (1 6 m)        Physical Exam   Constitutional: He appears well-developed and well-nourished  Neck: No thyromegaly present  Cardiovascular: Normal rate, regular rhythm and normal heart sounds  occ PACs   Pulmonary/Chest: Effort normal and breath sounds normal    Musculoskeletal: He exhibits no edema  Lymphadenopathy:     He has no cervical adenopathy  Vitals reviewed

## 2019-10-03 LAB
BUN SERPL-MCNC: 19 MG/DL (ref 7–25)
BUN/CREAT SERPL: NORMAL (CALC) (ref 6–22)
CALCIUM SERPL-MCNC: 9 MG/DL (ref 8.6–10.3)
CHLORIDE SERPL-SCNC: 105 MMOL/L (ref 98–110)
CO2 SERPL-SCNC: 29 MMOL/L (ref 20–32)
CREAT SERPL-MCNC: 0.99 MG/DL (ref 0.6–1.35)
CREAT UR-MCNC: 61 MG/DL (ref 20–320)
GLUCOSE SERPL-MCNC: 72 MG/DL (ref 65–99)
POTASSIUM SERPL-SCNC: 4.1 MMOL/L (ref 3.5–5.3)
PROT UR-MCNC: 103 MG/DL (ref 5–25)
PROT/CREAT UR: 1689 MG/G CREAT (ref 22–128)
SL AMB EGFR AFRICAN AMERICAN: 108 ML/MIN/1.73M2
SL AMB EGFR NON AFRICAN AMERICAN: 94 ML/MIN/1.73M2
SODIUM SERPL-SCNC: 138 MMOL/L (ref 135–146)

## 2019-10-04 ENCOUNTER — TELEPHONE (OUTPATIENT)
Dept: NEPHROLOGY | Facility: CLINIC | Age: 44
End: 2019-10-04

## 2019-10-04 NOTE — TELEPHONE ENCOUNTER
----- Message from Raine Byrne MD sent at 10/3/2019 12:50 PM EDT -----  Call and tell him kidney function normal and protein level the same as last visit so improved since on medication  If he has any questions let me know   ----- Message -----  From: Jean Blevins Lab Results In  Sent: 10/2/2019  11:00 PM EDT  To: Raine Byrne MD

## 2019-10-04 NOTE — TELEPHONE ENCOUNTER
Left message for the patient to call back in regards to his recent lab test results from Dr Emir Spivey MA

## 2019-10-04 NOTE — TELEPHONE ENCOUNTER
Spoke with the patient and he is aware of his lab test results  Patient has no further questions at this time        Ashley Granados MA

## 2019-10-15 ENCOUNTER — CLINICAL SUPPORT (OUTPATIENT)
Dept: FAMILY MEDICINE CLINIC | Facility: CLINIC | Age: 44
End: 2019-10-15
Payer: COMMERCIAL

## 2019-10-15 DIAGNOSIS — Z23 NEED FOR INFLUENZA VACCINATION: Primary | ICD-10-CM

## 2019-10-15 PROCEDURE — 90686 IIV4 VACC NO PRSV 0.5 ML IM: CPT

## 2019-10-15 PROCEDURE — 90471 IMMUNIZATION ADMIN: CPT

## 2019-11-18 DIAGNOSIS — N05.2 MEMBRANOUS GLOMERULONEPHRITIS: ICD-10-CM

## 2019-11-18 RX ORDER — LOSARTAN POTASSIUM 25 MG/1
TABLET ORAL
Qty: 90 TABLET | Refills: 1 | Status: SHIPPED | OUTPATIENT
Start: 2019-11-18 | End: 2020-05-29 | Stop reason: SDUPTHER

## 2019-12-30 ENCOUNTER — OFFICE VISIT (OUTPATIENT)
Dept: NEPHROLOGY | Facility: CLINIC | Age: 44
End: 2019-12-30
Payer: COMMERCIAL

## 2019-12-30 VITALS
DIASTOLIC BLOOD PRESSURE: 80 MMHG | HEART RATE: 70 BPM | HEIGHT: 63 IN | SYSTOLIC BLOOD PRESSURE: 122 MMHG | RESPIRATION RATE: 16 BRPM | BODY MASS INDEX: 27.29 KG/M2 | WEIGHT: 154 LBS

## 2019-12-30 DIAGNOSIS — I10 ESSENTIAL HYPERTENSION: ICD-10-CM

## 2019-12-30 DIAGNOSIS — N02.2 MEMBRANOUS NEPHROPATHY DETERMINED BY BIOPSY: Primary | ICD-10-CM

## 2019-12-30 DIAGNOSIS — R80.8 OTHER PROTEINURIA: ICD-10-CM

## 2019-12-30 PROCEDURE — 3079F DIAST BP 80-89 MM HG: CPT | Performed by: INTERNAL MEDICINE

## 2019-12-30 PROCEDURE — 3074F SYST BP LT 130 MM HG: CPT | Performed by: INTERNAL MEDICINE

## 2019-12-30 PROCEDURE — 99214 OFFICE O/P EST MOD 30 MIN: CPT | Performed by: INTERNAL MEDICINE

## 2019-12-30 NOTE — PROGRESS NOTES
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  His renal function remained normal with a creatinine is 0 9 and physically he has no findings of nephrotic syndrome as he has no edema  His blood pressure is also under excellent control  I have placed him on cyclosporin and we had a good response with proteinuria being reduced to under 2 g but unfortunately the latest estimation is up to 5 g  He is on cyclosporin as well as an ARB  I explained to him that he seems to have membranous in the category where he has waxing and waning of his proteinuria as opposed to complete remission  I am waiting phospho lipase a antibodies because when his proteinuria was lower these levels were negative lb curious to see if the antibody titers have increased when his proteinuria increased which is a way that we can follow disease activity  For now I again renal functions normal no edema continue current medications blood pressure is excellent I will call with the results of the lab testing  I am going to do urine protein estimation in 3 months and repeat blood work in urine protein estimation prior to his visit in 6 months I told to call if there is any questions or problems between visits  I did explain that if in the future we need to change medications there are some others that her little more intense that we can use including either ACTHAR or Rituxan  SUBJECTIVE:  Review of Systems   Constitution: Negative for chills, decreased appetite, fever and malaise/fatigue  HENT: Negative  Cardiovascular: Negative  Negative for chest pain, dyspnea on exertion, leg swelling, orthopnea and palpitations  Respiratory: Negative  Negative for cough, shortness of breath, sputum production and wheezing  Skin: Negative  Gastrointestinal: Negative  Negative for bloating, abdominal pain, diarrhea and nausea  Genitourinary: Negative  Negative for bladder incontinence, dysuria, hematuria and incomplete emptying  Neurological: Negative  Psychiatric/Behavioral: Negative  OBJECTIVE:  Current Weight: Weight - Scale: 69 9 kg (154 lb)  [email or web address]     Blood pressure 122/80, pulse 70, resp  rate 16, height 5' 3" (1 6 m), weight 69 9 kg (154 lb)  , Body mass index is 27 28 kg/m²  [unfilled]    Physical Exam: /80 (BP Location: Left arm, Patient Position: Sitting, Cuff Size: Standard)   Pulse 70   Resp 16   Ht 5' 3" (1 6 m)   Wt 69 9 kg (154 lb)   BMI 27 28 kg/m²   Physical Exam   Constitutional: He is oriented to person, place, and time  He appears well-developed and well-nourished  No distress  HENT:   Head: Normocephalic and atraumatic  Mouth/Throat: Oropharynx is clear and moist    Eyes: Pupils are equal, round, and reactive to light  EOM are normal  No scleral icterus  Neck: Normal range of motion  Neck supple  No JVD present  Cardiovascular: Normal rate and regular rhythm  Exam reveals no gallop and no friction rub  No edema  Pulmonary/Chest: Effort normal and breath sounds normal  No respiratory distress  He has no wheezes  He has no rales  Abdominal: Soft  Bowel sounds are normal  He exhibits no distension  There is no tenderness  There is no rebound  Neurological: He is alert and oriented to person, place, and time  Skin: He is not diaphoretic  Psychiatric: He has a normal mood and affect         Medications:    Current Outpatient Medications:     albuterol (2 5 mg/3 mL) 0 083 % nebulizer solution, Take 1 vial (2 5 mg total) by nebulization every 4 (four) hours as needed for wheezing or shortness of breath, Disp: 75 mL, Rfl: 5    albuterol (PROVENTIL HFA,VENTOLIN HFA) 90 mcg/act inhaler, Inhale 2 puffs every 4 (four) hours as needed for wheezing, Disp: 8 5 Inhaler, Rfl: 5    cycloSPORINE (SandIMMUNE) 100 MG/ML solution, Take 1 mL (100 mg total) by mouth every 12 (twelve) hours, Disp: 60 mL, Rfl: 5    ergocalciferol (VITAMIN D2) 50,000 units, Take 50,000 Units by mouth once a week, Disp: , Rfl: 11    losartan (COZAAR) 25 mg tablet, TAKE 1 TABLET BY MOUTH EVERY DAY, Disp: 90 tablet, Rfl: 1    mometasone-formoterol (DULERA) 200-5 MCG/ACT inhaler, Inhale 2 puffs 2 (two) times a day Rinse mouth after use , Disp: 1 Inhaler, Rfl: 5    Laboratory Results:  Lab Results   Component Value Date    WBC 4 53 02/21/2019    HGB 14 3 02/21/2019    HCT 43 3 02/21/2019    MCV 87 02/21/2019     02/21/2019     Lab Results   Component Value Date    SODIUM 139 12/27/2019    K 4 2 12/27/2019     12/27/2019    CO2 31 12/27/2019    BUN 15 12/27/2019    CREATININE 0 99 12/27/2019    GLUC 83 12/27/2019    CALCIUM 9 4 12/27/2019     Lab Results   Component Value Date    CALCIUM 9 4 12/27/2019     No results found for: LABPROT

## 2019-12-30 NOTE — LETTER
December 30, 2019     Anabel Lea, [address]    Patient: Cameron Mosley   YOB: 1975   Date of Visit: 12/30/2019       Dear Dr Rees Postin:    Thank you for referring Jose Alfredovashti Anju to me for evaluation  Below are my notes for this consultation  If you have questions, please do not hesitate to call me  I look forward to following your patient along with you  Sincerely,        Julius Barfield MD        CC: No Recipients  Julius Barfield MD  12/30/2019 12:37 PM  Sign at close encounter  NEPHROLOGY PROGRESS NOTE    Cameron Mosley 45 y o  male MRN: [id number]  Unit/Bed#:  Encounter: [id number]  Reason for Consult:  Membranous GN    The patient is here for routine follow-up  He looks great has been exercising continues to work at his job is enjoying it  He has been taking his medications and feels he has not missed any dosages  ASSESSMENT/PLAN:  1  Renal    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  His renal function remained normal with a creatinine is 0 9 and physically he has no findings of nephrotic syndrome as he has no edema  His blood pressure is also under excellent control  I have placed him on cyclosporin and we had a good response with proteinuria being reduced to under 2 g but unfortunately the latest estimation is up to 5 g  He is on cyclosporin as well as an ARB  I explained to him that he seems to have membranous in the category where he has waxing and waning of his proteinuria as opposed to complete remission  I am waiting phospho lipase a antibodies because when his proteinuria was lower these levels were negative lb curious to see if the antibody titers have increased when his proteinuria increased which is a way that we can follow disease activity  For now I again renal functions normal no edema continue current medications blood pressure is excellent I will call with the results of the lab testing    I am going to do urine protein estimation in 3 months and repeat blood work in urine protein estimation prior to his visit in 6 months I told to call if there is any questions or problems between visits  I did explain that if in the future we need to change medications there are some others that her little more intense that we can use including either ACTHAR or Rituxan  SUBJECTIVE:  Review of Systems   Constitution: Negative for chills, decreased appetite, fever and malaise/fatigue  HENT: Negative  Cardiovascular: Negative  Negative for chest pain, dyspnea on exertion, leg swelling, orthopnea and palpitations  Respiratory: Negative  Negative for cough, shortness of breath, sputum production and wheezing  Skin: Negative  Gastrointestinal: Negative  Negative for bloating, abdominal pain, diarrhea and nausea  Genitourinary: Negative  Negative for bladder incontinence, dysuria, hematuria and incomplete emptying  Neurological: Negative  Psychiatric/Behavioral: Negative  OBJECTIVE:  Current Weight: Weight - Scale: 69 9 kg (154 lb)  [email or web address]     Blood pressure 122/80, pulse 70, resp  rate 16, height 5' 3" (1 6 m), weight 69 9 kg (154 lb)  , Body mass index is 27 28 kg/m²  @[email or web address]@    Physical Exam: /80 (BP Location: Left arm, Patient Position: Sitting, Cuff Size: Standard)   Pulse 70   Resp 16   Ht 5' 3" (1 6 m)   Wt 69 9 kg (154 lb)   BMI 27 28 kg/m²    Physical Exam   Constitutional: He is oriented to person, place, and time  He appears well-developed and well-nourished  No distress  HENT:   Head: Normocephalic and atraumatic  Mouth/Throat: Oropharynx is clear and moist    Eyes: Pupils are equal, round, and reactive to light  EOM are normal  No scleral icterus  Neck: Normal range of motion  Neck supple  No JVD present  Cardiovascular: Normal rate and regular rhythm  Exam reveals no gallop and no friction rub  No edema     Pulmonary/Chest: Effort normal and breath sounds normal  No respiratory distress  He has no wheezes  He has no rales  Abdominal: Soft  Bowel sounds are normal  He exhibits no distension  There is no tenderness  There is no rebound  Neurological: He is alert and oriented to person, place, and time  Skin: He is not diaphoretic  Psychiatric: He has a normal mood and affect         Medications:    Current Outpatient Medications:     albuterol (2 5 mg/3 mL) 0 083 % nebulizer solution, Take 1 vial (2 5 mg total) by nebulization every 4 (four) hours as needed for wheezing or shortness of breath, Disp: 75 mL, Rfl: 5    albuterol (PROVENTIL HFA,VENTOLIN HFA) 90 mcg/act inhaler, Inhale 2 puffs every 4 (four) hours as needed for wheezing, Disp: 8 5 Inhaler, Rfl: 5    cycloSPORINE (SandIMMUNE) 100 MG/ML solution, Take 1 mL (100 mg total) by mouth every 12 (twelve) hours, Disp: 60 mL, Rfl: 5    ergocalciferol (VITAMIN D2) 50,000 units, Take 50,000 Units by mouth once a week, Disp: , Rfl: 11    losartan (COZAAR) 25 mg tablet, TAKE 1 TABLET BY MOUTH EVERY DAY, Disp: 90 tablet, Rfl: 1    mometasone-formoterol (DULERA) 200-5 MCG/ACT inhaler, Inhale 2 puffs 2 (two) times a day Rinse mouth after use , Disp: 1 Inhaler, Rfl: 5    Laboratory Results:  Lab Results   Component Value Date    WBC 4 53 02/21/2019    HGB 14 3 02/21/2019    HCT 43 3 02/21/2019    MCV 87 02/21/2019     02/21/2019     Lab Results   Component Value Date    SODIUM 139 12/27/2019    K 4 2 12/27/2019     12/27/2019    CO2 31 12/27/2019    BUN 15 12/27/2019    CREATININE 0 99 12/27/2019    GLUC 83 12/27/2019    CALCIUM 9 4 12/27/2019     Lab Results   Component Value Date    CALCIUM 9 4 12/27/2019     No results found for: LABPROT

## 2019-12-30 NOTE — PATIENT INSTRUCTIONS
You are here for follow-up sounds like her health has been doing very well you enjoying work and you really offer no specific complaints  Your creatinine level which is the blood test for the kidney 0 9 so that still perfectly normal   You have protein in urine related to membranous GN as we diagnosed by kidney biopsy  Your on treatment to try lower protein in the urine with cyclosporin and losartan  The protein estimation this time had increased to 5 g and I discussed with you that you seemed to have the clinical picture of a patient with membranous where the protein levels go up and down  Again the function is still normal   You have been taking her medications so we can explain it by missing those  Also there is another test that I was explaining to the antibody titer and I am curious to see when that comes back if it was elevated to indicate that your immune system was probably more active causing more protein  I will call you with that result comes in  Regardless at this point I still feel you should not worry and we will manage this chronically as we discussed  I am going to give you of slip to just do the urine protein estimation in 3 months and then again prior to her visit  If you have any questions or problems please call before your visit

## 2020-01-02 LAB
BUN SERPL-MCNC: 15 MG/DL (ref 7–25)
BUN/CREAT SERPL: NORMAL (CALC) (ref 6–22)
CALCIUM SERPL-MCNC: 9.4 MG/DL (ref 8.6–10.3)
CHLORIDE SERPL-SCNC: 103 MMOL/L (ref 98–110)
CO2 SERPL-SCNC: 31 MMOL/L (ref 20–32)
CREAT SERPL-MCNC: 0.99 MG/DL (ref 0.6–1.35)
CREAT UR-MCNC: 51 MG/DL (ref 20–320)
GLUCOSE SERPL-MCNC: 83 MG/DL (ref 65–99)
PLA2R IGG SER IA-ACNC: 7 RU/ML
PLA2R IGG SERPL QL IF: POSITIVE
POTASSIUM SERPL-SCNC: 4.2 MMOL/L (ref 3.5–5.3)
PROT UR-MCNC: 274 MG/DL (ref 5–25)
PROT/CREAT UR: 5.37 MG/MG CREAT (ref 0.02–0.13)
PROT/CREAT UR: 5373 MG/G CREAT (ref 22–128)
SL AMB EGFR AFRICAN AMERICAN: 108 ML/MIN/1.73M2
SL AMB EGFR NON AFRICAN AMERICAN: 94 ML/MIN/1.73M2
SODIUM SERPL-SCNC: 139 MMOL/L (ref 135–146)

## 2020-01-21 DIAGNOSIS — N05.2 MEMBRANOUS GLOMERULONEPHRITIS: ICD-10-CM

## 2020-01-31 DIAGNOSIS — I10 ESSENTIAL HYPERTENSION: ICD-10-CM

## 2020-01-31 DIAGNOSIS — N02.2 MEMBRANOUS NEPHROPATHY DETERMINED BY BIOPSY: Primary | ICD-10-CM

## 2020-01-31 RX ORDER — AMLODIPINE BESYLATE 5 MG/1
5 TABLET ORAL DAILY
Qty: 90 TABLET | Refills: 1 | Status: SHIPPED | OUTPATIENT
Start: 2020-01-31 | End: 2020-07-27

## 2020-02-22 LAB
CREAT UR-MCNC: 110 MG/DL (ref 20–320)
PROT UR-MCNC: 173 MG/DL (ref 5–25)
PROT/CREAT UR: 1.57 MG/MG CREAT (ref 0.02–0.13)
PROT/CREAT UR: 1573 MG/G CREAT (ref 22–128)

## 2020-03-27 ENCOUNTER — OFFICE VISIT (OUTPATIENT)
Dept: FAMILY MEDICINE CLINIC | Facility: CLINIC | Age: 45
End: 2020-03-27
Payer: COMMERCIAL

## 2020-03-27 VITALS
BODY MASS INDEX: 26.75 KG/M2 | RESPIRATION RATE: 18 BRPM | HEART RATE: 56 BPM | WEIGHT: 151 LBS | SYSTOLIC BLOOD PRESSURE: 132 MMHG | HEIGHT: 63 IN | DIASTOLIC BLOOD PRESSURE: 78 MMHG | TEMPERATURE: 98.9 F

## 2020-03-27 DIAGNOSIS — R00.2 PALPITATIONS: Primary | ICD-10-CM

## 2020-03-27 DIAGNOSIS — J45.40 MODERATE PERSISTENT ASTHMA, UNSPECIFIED WHETHER COMPLICATED: ICD-10-CM

## 2020-03-27 DIAGNOSIS — I10 ESSENTIAL HYPERTENSION: ICD-10-CM

## 2020-03-27 DIAGNOSIS — J45.30 MILD PERSISTENT ASTHMA WITHOUT COMPLICATION: ICD-10-CM

## 2020-03-27 PROCEDURE — 1036F TOBACCO NON-USER: CPT | Performed by: FAMILY MEDICINE

## 2020-03-27 PROCEDURE — 3078F DIAST BP <80 MM HG: CPT | Performed by: FAMILY MEDICINE

## 2020-03-27 PROCEDURE — 3075F SYST BP GE 130 - 139MM HG: CPT | Performed by: FAMILY MEDICINE

## 2020-03-27 PROCEDURE — 3008F BODY MASS INDEX DOCD: CPT | Performed by: FAMILY MEDICINE

## 2020-03-27 PROCEDURE — 99213 OFFICE O/P EST LOW 20 MIN: CPT | Performed by: FAMILY MEDICINE

## 2020-03-27 RX ORDER — ALBUTEROL SULFATE 2.5 MG/3ML
2.5 SOLUTION RESPIRATORY (INHALATION) EVERY 4 HOURS PRN
Qty: 75 ML | Refills: 5 | Status: SHIPPED | OUTPATIENT
Start: 2020-03-27 | End: 2020-09-25 | Stop reason: SDUPTHER

## 2020-03-27 RX ORDER — ALBUTEROL SULFATE 90 UG/1
2 AEROSOL, METERED RESPIRATORY (INHALATION) EVERY 4 HOURS PRN
Qty: 8.5 INHALER | Refills: 5 | Status: SHIPPED | OUTPATIENT
Start: 2020-03-27 | End: 2020-09-25 | Stop reason: SDUPTHER

## 2020-03-27 NOTE — PROGRESS NOTES
Assessment and Plan:    Problem List Items Addressed This Visit     None                 There are no diagnoses linked to this encounter  Subjective:      Patient ID: Bard Ortega is a 45 y o  male  CC:    Chief Complaint   Patient presents with    Follow-up     Patient present today for his 6 month follow up and for a few concerns which he will discuss with Dr Sherita Everett  HPI:    Here for f/u HTN, having some palpitations when climbing steps, occ wakes up  from sleep,had gluten test through [facility] and had questions about it, unfortunately no results available      The following portions of the patient's history were reviewed and updated as appropriate: allergies, current medications, past family history, past medical history, past social history, past surgical history and problem list     Review of Systems   Constitutional: Negative for activity change, appetite change and unexpected weight change  Respiratory: Positive for shortness of breath  Negative for cough  Cardiovascular: Positive for palpitations  Negative for chest pain and leg swelling  Gastrointestinal: Negative for abdominal pain, diarrhea and nausea  Occ fecal urgency after eating carbs   Skin: Positive for rash  Neurological: Positive for headaches  Negative for dizziness  Data to review:       Objective:    Vitals:    03/27/20 0753   BP: 132/78   BP Location: Left arm   Patient Position: Sitting   Cuff Size: Large   Pulse: 56   Resp: 18   Temp: 98 9 °F (37 2 °C)   TempSrc: Temporal   Weight: 68 5 kg (151 lb)   Height: 5' 3" (1 6 m)        Physical Exam   Constitutional: He appears well-developed and well-nourished  Neck: No thyromegaly present  Cardiovascular: Normal rate, regular rhythm and normal heart sounds  Pulmonary/Chest: Effort normal and breath sounds normal    Musculoskeletal: He exhibits no edema  Vitals reviewed

## 2020-04-03 ENCOUNTER — TELEPHONE (OUTPATIENT)
Dept: FAMILY MEDICINE CLINIC | Facility: CLINIC | Age: 45
End: 2020-04-03

## 2020-05-15 ENCOUNTER — HOSPITAL ENCOUNTER (OUTPATIENT)
Dept: NON INVASIVE DIAGNOSTICS | Facility: HOSPITAL | Age: 45
Discharge: HOME | End: 2020-05-15
Payer: COMMERCIAL

## 2020-05-15 DIAGNOSIS — R00.2 PALPITATIONS: ICD-10-CM

## 2020-05-15 PROCEDURE — 93225 XTRNL ECG REC<48 HRS REC: CPT

## 2020-05-15 PROCEDURE — 93226 XTRNL ECG REC<48 HR SCAN A/R: CPT

## 2020-05-20 PROCEDURE — 93227 XTRNL ECG REC<48 HR R&I: CPT | Performed by: INTERNAL MEDICINE

## 2020-05-29 DIAGNOSIS — N05.2 MEMBRANOUS GLOMERULONEPHRITIS: ICD-10-CM

## 2020-06-01 DIAGNOSIS — N05.2 MEMBRANOUS GLOMERULONEPHRITIS: ICD-10-CM

## 2020-06-01 RX ORDER — LOSARTAN POTASSIUM 25 MG/1
TABLET ORAL
Qty: 90 TABLET | Refills: 3 | Status: SHIPPED | OUTPATIENT
Start: 2020-06-01 | End: 2020-08-31

## 2020-06-01 RX ORDER — LOSARTAN POTASSIUM 25 MG/1
25 TABLET ORAL DAILY
Qty: 90 TABLET | Refills: 3 | Status: SHIPPED | OUTPATIENT
Start: 2020-06-01 | End: 2020-06-01

## 2020-06-02 LAB
BUN SERPL-MCNC: 20 MG/DL (ref 7–25)
BUN/CREAT SERPL: NORMAL (CALC) (ref 6–22)
CALCIUM SERPL-MCNC: 9 MG/DL (ref 8.6–10.3)
CHLORIDE SERPL-SCNC: 105 MMOL/L (ref 98–110)
CO2 SERPL-SCNC: 27 MMOL/L (ref 20–32)
CREAT SERPL-MCNC: 1.13 MG/DL (ref 0.6–1.35)
GLUCOSE SERPL-MCNC: 84 MG/DL (ref 65–99)
PLA2R IGG SER IA-ACNC: <2 RU/ML
PLA2R IGG SERPL QL IF: NEGATIVE
POTASSIUM SERPL-SCNC: 4.3 MMOL/L (ref 3.5–5.3)
SL AMB EGFR AFRICAN AMERICAN: 92 ML/MIN/1.73M2
SL AMB EGFR NON AFRICAN AMERICAN: 80 ML/MIN/1.73M2
SODIUM SERPL-SCNC: 138 MMOL/L (ref 135–146)

## 2020-06-04 ENCOUNTER — TELEPHONE (OUTPATIENT)
Dept: NEPHROLOGY | Facility: CLINIC | Age: 45
End: 2020-06-04

## 2020-06-20 DIAGNOSIS — N05.2 MEMBRANOUS GLOMERULONEPHRITIS: ICD-10-CM

## 2020-06-21 RX ORDER — CYCLOSPORINE 100 MG/ML
SOLUTION ORAL
Qty: 50 ML | Refills: 5 | Status: SHIPPED | OUTPATIENT
Start: 2020-06-21 | End: 2021-01-05

## 2020-07-27 DIAGNOSIS — I10 ESSENTIAL HYPERTENSION: ICD-10-CM

## 2020-07-27 RX ORDER — AMLODIPINE BESYLATE 5 MG/1
TABLET ORAL
Qty: 90 TABLET | Refills: 1 | Status: SHIPPED | OUTPATIENT
Start: 2020-07-27 | End: 2021-02-02

## 2020-08-28 ENCOUNTER — TELEPHONE (OUTPATIENT)
Dept: NEPHROLOGY | Facility: CLINIC | Age: 45
End: 2020-08-28

## 2020-08-28 NOTE — TELEPHONE ENCOUNTER
Patient would like to speak to you at your earliest convenience regarding his recent BP readings   He stated they have been running high and he is concerned

## 2020-08-31 DIAGNOSIS — N05.2 MEMBRANOUS GLOMERULONEPHRITIS: ICD-10-CM

## 2020-08-31 RX ORDER — LOSARTAN POTASSIUM 50 MG/1
50 TABLET ORAL DAILY
Qty: 30 TABLET | Refills: 5 | Status: SHIPPED | OUTPATIENT
Start: 2020-08-31 | End: 2021-02-23

## 2020-08-31 NOTE — PROGRESS NOTES
I spoke to the patient at the end of last week he told me at times his blood pressure was slightly elevated  I am raising his losartan to 50 mg a day and continue other medications at the same dose and will be seeing him in the near future

## 2020-09-08 ENCOUNTER — OFFICE VISIT (OUTPATIENT)
Dept: NEPHROLOGY | Facility: CLINIC | Age: 45
End: 2020-09-08
Payer: COMMERCIAL

## 2020-09-08 VITALS
BODY MASS INDEX: 26.75 KG/M2 | DIASTOLIC BLOOD PRESSURE: 78 MMHG | HEIGHT: 63 IN | HEART RATE: 70 BPM | WEIGHT: 151 LBS | SYSTOLIC BLOOD PRESSURE: 138 MMHG

## 2020-09-08 DIAGNOSIS — N02.2 MEMBRANOUS NEPHROPATHY DETERMINED BY BIOPSY: ICD-10-CM

## 2020-09-08 DIAGNOSIS — R80.8 OTHER PROTEINURIA: Primary | ICD-10-CM

## 2020-09-08 PROCEDURE — 99214 OFFICE O/P EST MOD 30 MIN: CPT | Performed by: INTERNAL MEDICINE

## 2020-09-08 NOTE — PATIENT INSTRUCTIONS
You are here for follow-up and it sounds like her blood pressures come down nicely in your on losartan alone at 50 mg so just continue that and monitor you can always call me if the blood pressure would go up again  With respect to her kidney function the creatinine 0 9 so that still normal and the protein estimation was great was the lowest it has been at 627 mg  For now continue your current medications the cyclosporin and losartan and will continue to monitor  Overall as we showed you this is the lowers the protein estimation is been so I am very pleased with

## 2020-09-08 NOTE — LETTER
September 8, 2020     [place], [address]    Patient: Miguel Walters   YOB: 1975   Date of Visit: 9/8/2020       Dear Dr Rosalee Chavira:    Thank you for referring Pascual Trent to me for evaluation  Below are my notes for this consultation  If you have questions, please do not hesitate to call me  I look forward to following your patient along with you  Sincerely,        Marta Gallo MD        CC: No Recipients  Marta Gallo MD  9/8/2020 11:46 AM  Sign when Signing Visit  NEPHROLOGY PROGRESS NOTE    Miguel Walters 45 y o  male MRN: [id number]  Unit/Bed#:  Encounter: [id number]  Reason for Consult:  Membranous GN    The patient is here for routine follow-up  He has been doing well he has not been ill and 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  He has been doing well with no intercurrent illnesses and no specific complaints today  We reviewed the patient's he told me he is now taking losartan 50 mg a day as we recommended he stop the amlodipine cultures been lower at home  ASSESSMENT/PLAN:  1  Renal    The patient has biopsy-proven membranous GN please being treated with cyclosporin to reduce proteinuria and an ARB  He is not been given any other immunosuppressive medication  His latest labs reveal a creatinine of 0 9 and protein estimation of 627 mg which is the lowest it has been  This levels even sent prior to the increase in losartan so clinically he has an well  Phospho lipase A2 receptor antibodies are pending but last ones in May were negative so hopefully the patient is negative now and I suspect that to be the case given the low levels of proteinuria  For now I will continue with his current treatment and if we see persistently lower proteinuria we would try and stop the cyclosporin at that time but for now I told to continue with no changes  BMP in urine protein estimation prior to visit      It was told to call if there is any problems or questions before his next visit  2  Hypertension    Blood pressure is acceptable  He had called me a week or so ago stating that at times it was elevated to increase losartan to 50 mg a day  He then told me that was running low so he stopped his amlodipine it still acceptable so he will continue to monitor  SUBJECTIVE:  Review of Systems   Constitution: Negative for chills, decreased appetite, fever and malaise/fatigue  HENT: Negative  Eyes: Negative  Cardiovascular: Negative  Negative for chest pain, dyspnea on exertion, leg swelling and orthopnea  Respiratory: Negative  Negative for cough, shortness of breath, sputum production and wheezing  Skin: Negative  Negative for rash  Gastrointestinal: Negative  Negative for abdominal pain, diarrhea, nausea and vomiting  Genitourinary: Negative for dysuria, flank pain, hematuria and incomplete emptying  Neurological: Negative  Psychiatric/Behavioral: Negative  Negative for altered mental status, depression, hallucinations and hypervigilance  OBJECTIVE:  Current Weight: Weight - Scale: 68 5 kg (151 lb)  [email or web address] com:     Blood pressure 138/78, pulse 70, height 5' 3" (1 6 m), weight 68 5 kg (151 lb)  , Body mass index is 26 75 kg/m²  [unfilled]    Physical Exam: /78   Pulse 70   Ht 5' 3" (1 6 m)   Wt 68 5 kg (151 lb)   BMI 26 75 kg/m²   Physical Exam  Constitutional:       Appearance: Normal appearance  He is not ill-appearing or diaphoretic  HENT:      Head: Normocephalic and atraumatic  Nose:      Comments: Wearing a mask     Mouth/Throat:      Comments: Wearing a mask  Eyes:      General: No scleral icterus  Extraocular Movements: Extraocular movements intact  Neck:      Musculoskeletal: Normal range of motion and neck supple  Cardiovascular:      Rate and Rhythm: Normal rate and regular rhythm  Heart sounds: Normal heart sounds  No friction rub  No gallop         Comments: No edema  Pulmonary:      Effort: Pulmonary effort is normal  No respiratory distress  Breath sounds: Normal breath sounds  No wheezing, rhonchi or rales  Abdominal:      General: Bowel sounds are normal  There is no distension  Palpations: Abdomen is soft  Tenderness: There is no abdominal tenderness  There is no rebound  Skin:     General: Skin is warm  Neurological:      General: No focal deficit present  Mental Status: He is alert and oriented to person, place, and time  Mental status is at baseline  Psychiatric:         Mood and Affect: Mood normal          Behavior: Behavior normal          Thought Content:  Thought content normal          Medications:    Current Outpatient Medications:     albuterol (2 5 mg/3 mL) 0 083 % nebulizer solution, Take 1 vial (2 5 mg total) by nebulization every 4 (four) hours as needed for wheezing or shortness of breath, Disp: 75 mL, Rfl: 5    albuterol (PROVENTIL HFA,VENTOLIN HFA) 90 mcg/act inhaler, Inhale 2 puffs every 4 (four) hours as needed for wheezing, Disp: 8 5 Inhaler, Rfl: 5    losartan (COZAAR) 50 mg tablet, Take 1 tablet (50 mg total) by mouth daily, Disp: 30 tablet, Rfl: 5    mometasone-formoterol (Dulera) 200-5 MCG/ACT inhaler, Inhale 2 puffs 2 (two) times a day Rinse mouth after use , Disp: 1 Inhaler, Rfl: 5    SANDIMMUNE 100 MG/ML solution, TAKE 1 ML (100 MG TOTAL) BY MOUTH EVERY 12 (TWELVE) HOURS, Disp: 50 mL, Rfl: 5    amLODIPine (NORVASC) 5 mg tablet, TAKE 1 TABLET BY MOUTH EVERY DAY, Disp: 90 tablet, Rfl: 1    ergocalciferol (VITAMIN D2) 50,000 units, Take 50,000 Units by mouth once a week, Disp: , Rfl: 11    Laboratory Results:  Lab Results   Component Value Date    WBC 4 53 02/21/2019    HGB 14 3 02/21/2019    HCT 43 3 02/21/2019    MCV 87 02/21/2019     02/21/2019     Lab Results   Component Value Date    SODIUM 138 05/28/2020    K 4 3 05/28/2020     05/28/2020    CO2 27 05/28/2020    BUN 20 05/28/2020 CREATININE 1 13 05/28/2020    GLUC 84 05/28/2020    CALCIUM 9 0 05/28/2020     Lab Results   Component Value Date    CALCIUM 9 0 05/28/2020     No results found for: LABPROT

## 2020-09-08 NOTE — PROGRESS NOTES
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  He has been doing well with no intercurrent illnesses and no specific complaints today  We reviewed the patient's he told me he is now taking losartan 50 mg a day as we recommended he stop the amlodipine cultures been lower at home  ASSESSMENT/PLAN:  1  Renal    The patient has biopsy-proven membranous GN please being treated with cyclosporin to reduce proteinuria and an ARB  He is not been given any other immunosuppressive medication  His latest labs reveal a creatinine of 0 9 and protein estimation of 627 mg which is the lowest it has been  This levels even sent prior to the increase in losartan so clinically he has an well  Phospho lipase A2 receptor antibodies are pending but last ones in May were negative so hopefully the patient is negative now and I suspect that to be the case given the low levels of proteinuria  For now I will continue with his current treatment and if we see persistently lower proteinuria we would try and stop the cyclosporin at that time but for now I told to continue with no changes  BMP in urine protein estimation prior to visit  It was told to call if there is any problems or questions before his next visit  2  Hypertension    Blood pressure is acceptable  He had called me a week or so ago stating that at times it was elevated to increase losartan to 50 mg a day  He then told me that was running low so he stopped his amlodipine it still acceptable so he will continue to monitor  SUBJECTIVE:  Review of Systems   Constitution: Negative for chills, decreased appetite, fever and malaise/fatigue  HENT: Negative  Eyes: Negative  Cardiovascular: Negative    Negative for chest pain, dyspnea on exertion, leg swelling and orthopnea  Respiratory: Negative  Negative for cough, shortness of breath, sputum production and wheezing  Skin: Negative  Negative for rash  Gastrointestinal: Negative  Negative for abdominal pain, diarrhea, nausea and vomiting  Genitourinary: Negative for dysuria, flank pain, hematuria and incomplete emptying  Neurological: Negative  Psychiatric/Behavioral: Negative  Negative for altered mental status, depression, hallucinations and hypervigilance  OBJECTIVE:  Current Weight: Weight - Scale: 68 5 kg (151 lb)  [email or web address] com:     Blood pressure 138/78, pulse 70, height 5' 3" (1 6 m), weight 68 5 kg (151 lb)  , Body mass index is 26 75 kg/m²  [unfilled]    Physical Exam: /78   Pulse 70   Ht 5' 3" (1 6 m)   Wt 68 5 kg (151 lb)   BMI 26 75 kg/m²   Physical Exam  Constitutional:       Appearance: Normal appearance  He is not ill-appearing or diaphoretic  HENT:      Head: Normocephalic and atraumatic  Nose:      Comments: Wearing a mask     Mouth/Throat:      Comments: Wearing a mask  Eyes:      General: No scleral icterus  Extraocular Movements: Extraocular movements intact  Neck:      Musculoskeletal: Normal range of motion and neck supple  Cardiovascular:      Rate and Rhythm: Normal rate and regular rhythm  Heart sounds: Normal heart sounds  No friction rub  No gallop  Comments: No edema  Pulmonary:      Effort: Pulmonary effort is normal  No respiratory distress  Breath sounds: Normal breath sounds  No wheezing, rhonchi or rales  Abdominal:      General: Bowel sounds are normal  There is no distension  Palpations: Abdomen is soft  Tenderness: There is no abdominal tenderness  There is no rebound  Skin:     General: Skin is warm  Neurological:      General: No focal deficit present  Mental Status: He is alert and oriented to person, place, and time  Mental status is at baseline  Psychiatric:         Mood and Affect: Mood normal          Behavior: Behavior normal          Thought Content:  Thought content normal          Medications:    Current Outpatient Medications:     albuterol (2 5 mg/3 mL) 0 083 % nebulizer solution, Take 1 vial (2 5 mg total) by nebulization every 4 (four) hours as needed for wheezing or shortness of breath, Disp: 75 mL, Rfl: 5    albuterol (PROVENTIL HFA,VENTOLIN HFA) 90 mcg/act inhaler, Inhale 2 puffs every 4 (four) hours as needed for wheezing, Disp: 8 5 Inhaler, Rfl: 5    losartan (COZAAR) 50 mg tablet, Take 1 tablet (50 mg total) by mouth daily, Disp: 30 tablet, Rfl: 5    mometasone-formoterol (Dulera) 200-5 MCG/ACT inhaler, Inhale 2 puffs 2 (two) times a day Rinse mouth after use , Disp: 1 Inhaler, Rfl: 5    SANDIMMUNE 100 MG/ML solution, TAKE 1 ML (100 MG TOTAL) BY MOUTH EVERY 12 (TWELVE) HOURS, Disp: 50 mL, Rfl: 5    amLODIPine (NORVASC) 5 mg tablet, TAKE 1 TABLET BY MOUTH EVERY DAY, Disp: 90 tablet, Rfl: 1    ergocalciferol (VITAMIN D2) 50,000 units, Take 50,000 Units by mouth once a week, Disp: , Rfl: 11    Laboratory Results:  Lab Results   Component Value Date    WBC 4 53 02/21/2019    HGB 14 3 02/21/2019    HCT 43 3 02/21/2019    MCV 87 02/21/2019     02/21/2019     Lab Results   Component Value Date    SODIUM 138 05/28/2020    K 4 3 05/28/2020     05/28/2020    CO2 27 05/28/2020    BUN 20 05/28/2020    CREATININE 1 13 05/28/2020    GLUC 84 05/28/2020    CALCIUM 9 0 05/28/2020     Lab Results   Component Value Date    CALCIUM 9 0 05/28/2020     No results found for: LABPROT

## 2020-09-09 LAB
BUN SERPL-MCNC: 11 MG/DL (ref 7–25)
BUN/CREAT SERPL: NORMAL (CALC) (ref 6–22)
CALCIUM SERPL-MCNC: 8.9 MG/DL (ref 8.6–10.3)
CHLORIDE SERPL-SCNC: 108 MMOL/L (ref 98–110)
CO2 SERPL-SCNC: 26 MMOL/L (ref 20–32)
CREAT SERPL-MCNC: 0.99 MG/DL (ref 0.6–1.35)
CREAT UR-MCNC: 161 MG/DL (ref 20–320)
GLUCOSE SERPL-MCNC: 87 MG/DL (ref 65–99)
PLA2R IGG SER IA-ACNC: <4 RU/ML
PLA2R IGG SER IA-ACNC: <4 RU/ML
PLA2R IGG SERPL QL IF: NEGATIVE
POTASSIUM SERPL-SCNC: 4.1 MMOL/L (ref 3.5–5.3)
PROT UR-MCNC: 101 MG/DL (ref 5–25)
PROT/CREAT UR: 0.63 MG/MG CREAT (ref 0.02–0.13)
PROT/CREAT UR: 627 MG/G CREAT (ref 22–128)
SL AMB EGFR AFRICAN AMERICAN: 108 ML/MIN/1.73M2
SL AMB EGFR NON AFRICAN AMERICAN: 94 ML/MIN/1.73M2
SODIUM SERPL-SCNC: 139 MMOL/L (ref 135–146)

## 2020-09-25 ENCOUNTER — OFFICE VISIT (OUTPATIENT)
Dept: FAMILY MEDICINE CLINIC | Facility: CLINIC | Age: 45
End: 2020-09-25
Payer: COMMERCIAL

## 2020-09-25 VITALS
TEMPERATURE: 97.6 F | DIASTOLIC BLOOD PRESSURE: 78 MMHG | HEART RATE: 88 BPM | WEIGHT: 149 LBS | RESPIRATION RATE: 16 BRPM | SYSTOLIC BLOOD PRESSURE: 134 MMHG | BODY MASS INDEX: 26.4 KG/M2 | HEIGHT: 63 IN

## 2020-09-25 DIAGNOSIS — Z23 ENCOUNTER FOR IMMUNIZATION: Primary | ICD-10-CM

## 2020-09-25 DIAGNOSIS — R76.8 POSITIVE AUTOANTIBODY SCREENING FOR CELIAC DISEASE: ICD-10-CM

## 2020-09-25 DIAGNOSIS — J45.40 MODERATE PERSISTENT ASTHMA, UNSPECIFIED WHETHER COMPLICATED: ICD-10-CM

## 2020-09-25 PROCEDURE — 90686 IIV4 VACC NO PRSV 0.5 ML IM: CPT | Performed by: FAMILY MEDICINE

## 2020-09-25 PROCEDURE — 99213 OFFICE O/P EST LOW 20 MIN: CPT | Performed by: FAMILY MEDICINE

## 2020-09-25 PROCEDURE — 90472 IMMUNIZATION ADMIN EACH ADD: CPT | Performed by: FAMILY MEDICINE

## 2020-09-25 PROCEDURE — 90732 PPSV23 VACC 2 YRS+ SUBQ/IM: CPT | Performed by: FAMILY MEDICINE

## 2020-09-25 PROCEDURE — 90471 IMMUNIZATION ADMIN: CPT | Performed by: FAMILY MEDICINE

## 2020-09-25 RX ORDER — ALBUTEROL SULFATE 2.5 MG/3ML
2.5 SOLUTION RESPIRATORY (INHALATION) EVERY 4 HOURS PRN
Qty: 100 ML | Refills: 5 | Status: SHIPPED | OUTPATIENT
Start: 2020-09-25 | End: 2021-12-29 | Stop reason: SDUPTHER

## 2020-09-25 RX ORDER — MOMETASONE FUROATE AND FORMOTEROL FUMARATE DIHYDRATE 200; 5 UG/1; UG/1
2 AEROSOL RESPIRATORY (INHALATION) 2 TIMES DAILY
Qty: 1 INHALER | Refills: 5 | Status: SHIPPED | OUTPATIENT
Start: 2020-09-25 | End: 2021-03-15 | Stop reason: SDUPTHER

## 2020-09-25 RX ORDER — ALBUTEROL SULFATE 90 UG/1
2 AEROSOL, METERED RESPIRATORY (INHALATION) EVERY 4 HOURS PRN
Qty: 1 INHALER | Refills: 5 | Status: SHIPPED | OUTPATIENT
Start: 2020-09-25 | End: 2021-03-15 | Stop reason: SDUPTHER

## 2020-09-25 NOTE — PATIENT INSTRUCTIONS
Await lab, review lab  Weight Management   AMBULATORY CARE:   Why it is important to manage your weight:  Being overweight increases your risk of health conditions such as heart disease, high blood pressure, type 2 diabetes, and certain types of cancer  It can also increase your risk for osteoarthritis, sleep apnea, and other respiratory problems  Aim for a slow, steady weight loss  Even a small amount of weight loss can lower your risk of health problems  How to lose weight safely:  A safe and healthy way to lose weight is to eat fewer calories and get regular exercise  You can lose up about 1 pound a week by decreasing the number of calories you eat by 500 calories each day  You can decrease calories by eating smaller portion sizes or by cutting out high-calorie foods  Read labels to find out how many calories are in the foods you eat  You can also burn calories with exercise such as walking, swimming, or biking  You will be more likely to keep weight off if you make these changes part of your lifestyle  Healthy meal plan for weight management:  A healthy meal plan includes a variety of foods, contains fewer calories, and helps you stay healthy  A healthy meal plan includes the following:  · Eat whole-grain foods more often  A healthy meal plan should contain fiber  Fiber is the part of grains, fruits, and vegetables that is not broken down by your body  Whole-grain foods are healthy and provide extra fiber in your diet  Some examples of whole-grain foods are whole-wheat breads and pastas, oatmeal, brown rice, and bulgur  · Eat a variety of vegetables every day  Include dark, leafy greens such as spinach, kale, sharita greens, and mustard greens  Eat yellow and orange vegetables such as carrots, sweet potatoes, and winter squash  · Eat a variety of fruits every day  Choose fresh or canned fruit (canned in its own juice or light syrup) instead of juice  Fruit juice has very little or no fiber      · Eat low-fat dairy foods  Drink fat-free (skim) milk or 1% milk  Eat fat-free yogurt and low-fat cottage cheese  Try low-fat cheeses such as mozzarella and other reduced-fat cheeses  · Choose meat and other protein foods that are low in fat  Choose beans or other legumes such as split peas or lentils  Choose fish, skinless poultry (chicken or turkey), or lean cuts of red meat (beef or pork)  Before you cook meat or poultry, cut off any visible fat  · Use less fat and oil  Try baking foods instead of frying them  Add less fat, such as margarine, sour cream, regular salad dressing and mayonnaise to foods  Eat fewer high-fat foods  Some examples of high-fat foods include french fries, doughnuts, ice cream, and cakes  · Eat fewer sweets  Limit foods and drinks that are high in sugar  This includes candy, cookies, regular soda, and sweetened drinks  Ways to decrease calories:   · Eat smaller portions  ¨ Use a small plate with smaller servings  ¨ Do not eat second helpings  ¨ When you eat at a restaurant, ask for a box and place half of your meal in the box before you eat  ¨ Share an entrée with someone else  · Replace high-calorie snacks with healthy, low-calorie snacks  ¨ Choose fresh fruit, vegetables, fat-free rice cakes, or air-popped popcorn instead of potato chips, nuts, or chocolate  ¨ Choose water or calorie-free drinks instead of soda or sweetened drinks  · Eat regular meals  Skipping meals can lead to overeating later in the day  Eat a healthy snack in place of a meal if you do not have time to eat a regular meal      · Do not shop for groceries when you are hungry  You may be more likely to make unhealthy food choices  Take a grocery list of healthy foods and shop after you have eaten  Exercise:  Exercise at least 30 minutes per day on most days of the week  Some examples of exercise include walking, biking, dancing, and swimming   You can also fit in more physical activity by taking the stairs instead of the elevator or parking farther away from stores  Ask your healthcare provider about the best exercise plan for you  Other things to consider as you try to lose weight:   · Be aware of situations that may give you the urge to overeat, such as eating while watching television  Find ways to avoid these situations  For example, read a book, go for a walk, or do crafts  · Meet with a weight loss support group or friends who are also trying to lose weight  This may help you stay motivated to continue working on your weight loss goals  © 2017 [address] [employer] Information is for End User's use only and may not be sold, redistributed or otherwise used for commercial purposes  All illustrations and images included in CareNotes® are the copyrighted property of A D A M [employer] Inc  or [employer]  The above information is an  only  It is not intended as medical advice for individual conditions or treatments  Talk to your doctor, nurse or pharmacist before following any medical regimen to see if it is safe and effective for you

## 2020-09-25 NOTE — PROGRESS NOTES
Assessment and Plan:    Problem List Items Addressed This Visit        Respiratory    Moderate persistent asthma      Other Visit Diagnoses     Encounter for immunization    -  Primary    Mild persistent asthma without complication                     Diagnoses and all orders for this visit:    Encounter for immunization  -     influenza vaccine, quadrivalent, 0 5 mL, preservative-free, for adult and pediatric patients 6 mos+ (AFLURIA, FLUARIX, FLULAVAL, FLUZONE)  -     PNEUMOCOCCAL POLYSACCHARIDE VACCINE 23-VALENT =>1YO SQ IM    Moderate persistent asthma, unspecified whether complicated  -     mometasone-formoterol (Dulera) 200-5 MCG/ACT inhaler; Inhale 2 puffs 2 (two) times a day Rinse mouth after use  -     albuterol (PROVENTIL HFA,VENTOLIN HFA) 90 mcg/act inhaler; Inhale 2 puffs every 4 (four) hours as needed for wheezing  -     albuterol (2 5 mg/3 mL) 0 083 % nebulizer solution; Take 1 vial (2 5 mg total) by nebulization every 4 (four) hours as needed for wheezing or shortness of breath    Positive autoantibody screening for celiac disease  -     Celiac Disease Antibody Profile; Future    BMI 26 0-26 9,adult    Other orders  -     Cancel: PNEUMOCOCCAL POLYSACCHARIDE VACCINE 23-VALENT =>1YO SQ IM              Subjective:      Patient ID: Tina Cabral is a 39 y o  male  CC:    Chief Complaint   Patient presents with    Follow-up     pt here for a follow and to review lab results  R Cesar    Asthma       HPI:    F/u asthma, occ sticking senation with throat clearing-does rinse mouth after  Dulera, does have sinus problems-may be doing sinus surgery      The following portions of the patient's history were reviewed and updated as appropriate: allergies, current medications, past family history, past medical history, past social history, past surgical history and problem list         Review of Systems   Constitutional: Positive for unexpected weight change   Negative for activity change, appetite change and fatigue  2 lb loss   HENT: Positive for postnasal drip  Negative for rhinorrhea and sinus pain  Respiratory: Positive for cough  Negative for shortness of breath  Neurological: Negative for dizziness and headaches  Psychiatric/Behavioral: Positive for sleep disturbance  Data to review:       Objective:    Vitals:    09/25/20 0822   BP: 134/78   BP Location: Left arm   Patient Position: Sitting   Cuff Size: Standard   Pulse: 88   Resp: 16   Temp: 97 6 °F (36 4 °C)   TempSrc: Temporal   Weight: 67 6 kg (149 lb)   Height: 5' 3" (1 6 m)        Physical Exam  Vitals signs reviewed  Constitutional:       Appearance: Normal appearance  Neck:      Vascular: No carotid bruit  Cardiovascular:      Rate and Rhythm: Normal rate and regular rhythm  Pulses: Normal pulses  Heart sounds: Normal heart sounds  Pulmonary:      Effort: Pulmonary effort is normal       Breath sounds: Normal breath sounds  Musculoskeletal:      Right lower leg: No edema  Left lower leg: No edema  Lymphadenopathy:      Cervical: No cervical adenopathy  Neurological:      Mental Status: He is alert     Psychiatric:         Mood and Affect: Mood normal

## 2020-09-25 NOTE — PROGRESS NOTES
BMI Counseling: Body mass index is 26 39 kg/m²  The BMI is above normal  Nutrition recommendations include reducing portion sizes, decreasing overall calorie intake, 3-5 servings of fruits/vegetables daily, reducing fast food intake and consuming healthier snacks  Exercise recommendations include exercising 3-5 times per week

## 2020-09-30 ENCOUNTER — TELEMEDICINE (OUTPATIENT)
Dept: FAMILY MEDICINE CLINIC | Facility: CLINIC | Age: 45
End: 2020-09-30
Payer: COMMERCIAL

## 2020-09-30 VITALS
SYSTOLIC BLOOD PRESSURE: 130 MMHG | RESPIRATION RATE: 20 BRPM | DIASTOLIC BLOOD PRESSURE: 80 MMHG | TEMPERATURE: 96.5 F | OXYGEN SATURATION: 97 % | HEART RATE: 68 BPM | WEIGHT: 149 LBS | BODY MASS INDEX: 26.39 KG/M2

## 2020-09-30 DIAGNOSIS — Z20.828 EXPOSURE TO SARS-ASSOCIATED CORONAVIRUS: Primary | ICD-10-CM

## 2020-09-30 PROCEDURE — 1036F TOBACCO NON-USER: CPT | Performed by: FAMILY MEDICINE

## 2020-09-30 PROCEDURE — 99213 OFFICE O/P EST LOW 20 MIN: CPT | Performed by: FAMILY MEDICINE

## 2020-09-30 PROCEDURE — 3079F DIAST BP 80-89 MM HG: CPT | Performed by: FAMILY MEDICINE

## 2020-09-30 PROCEDURE — U0003 INFECTIOUS AGENT DETECTION BY NUCLEIC ACID (DNA OR RNA); SEVERE ACUTE RESPIRATORY SYNDROME CORONAVIRUS 2 (SARS-COV-2) (CORONAVIRUS DISEASE [COVID-19]), AMPLIFIED PROBE TECHNIQUE, MAKING USE OF HIGH THROUGHPUT TECHNOLOGIES AS DESCRIBED BY CMS-2020-01-R: HCPCS | Performed by: FAMILY MEDICINE

## 2020-09-30 NOTE — PROGRESS NOTES
COVID-19 Virtual Visit     Assessment/Plan:    Problem List Items Addressed This Visit     None      Visit Diagnoses     Exposure to SARS-associated coronavirus    -  Primary    Relevant Orders    Novel Coronavirus (COVID-19), PCR [facility] - Collected in Office        This virtual check-in was done via [employer] and patient was informed that this is not a secure, HIPAA-complaint platform  He agrees to proceed     Disposition:      I referred Kori Park to one of our centralized sites for a COVID-19 swab    I spent 10 minutes directly with the patient during this visit    Encounter provider Vidhi Frye MD    Provider located at [address] [place] [phone]    Recent Visits  Date Type Provider Dept   09/25/20 Office Visit Vidhi Frye MD [address] [place] [address] Primary Care   Showing recent visits within past 7 days and meeting all other requirements     Today's Visits  Date Type Provider Dept   09/30/20 Telemedicine Vidhi Frye MD [address] [place] [address] Primary Care   Showing today's visits and meeting all other requirements     Future Appointments  No visits were found meeting these conditions  Showing future appointments within next 150 days and meeting all other requirements        Patient agrees to participate in a virtual check in via telephone or video visit instead of presenting to the office to address urgent/immediate medical needs  Patient is aware this is a billable service  After connecting through [employer], the patient was identified by name and date of birth  Parris Orellana was informed that this was a telemedicine visit and that the exam was being conducted confidentially over secure lines  My office door was closed  No one else was in the room  Parris Orellana acknowledged consent and understanding of privacy and security of the telemedicine visit    I informed the patient that I have reviewed his record in Epic and presented the opportunity for him to ask any questions regarding the visit today  The patient agreed to participate  Subjective  Ismael Winston is a 39 y o  male who is concerned about COVID-19  He reports no symptoms, requires screening  He has not experienced no symptoms, requires screening He has not had contact with a person who is under investigation for or who is positive for COVID-19 within the last 14 days  He has not been hospitalized recently for fever and/or lower respiratory symptoms  cases in his building but not on his unit    Past Medical History:   Diagnosis Date    Acute appendicitis     Asthma     Hypertension     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    Proteinuria        Past Surgical History:   Procedure Laterality Date    APPENDECTOMY      IR BIOPSY KIDNEY MASS  2/21/2019    SHOULDER SURGERY      TRIGGER FINGER RELEASE         Current Outpatient Medications   Medication Sig Dispense Refill    albuterol (2 5 mg/3 mL) 0 083 % nebulizer solution Take 1 vial (2 5 mg total) by nebulization every 4 (four) hours as needed for wheezing or shortness of breath 100 mL 5    albuterol (PROVENTIL HFA,VENTOLIN HFA) 90 mcg/act inhaler Inhale 2 puffs every 4 (four) hours as needed for wheezing 1 Inhaler 5    amLODIPine (NORVASC) 5 mg tablet TAKE 1 TABLET BY MOUTH EVERY DAY 90 tablet 1    ergocalciferol (VITAMIN D2) 50,000 units Take 50,000 Units by mouth once a week  11    losartan (COZAAR) 50 mg tablet Take 1 tablet (50 mg total) by mouth daily 30 tablet 5    mometasone-formoterol (Dulera) 200-5 MCG/ACT inhaler Inhale 2 puffs 2 (two) times a day Rinse mouth after use  1 Inhaler 5    SANDIMMUNE 100 MG/ML solution TAKE 1 ML (100 MG TOTAL) BY MOUTH EVERY 12 (TWELVE) HOURS 50 mL 5     No current facility-administered medications for this visit           Allergies   Allergen Reactions    Molds & Smuts Shortness Of Breath and Rash    Other Shortness Of Breath    Shellfish Allergy Hives    Shellfish-Derived Products HPI  False positive possibly at work, no cases of covid on his unit, no exposure, wants to get retested  2 other co-workers who tested positive also believe they have false positive, no sx except throat clearing      Data to review:          COVID 19 Instructions    Ángel Vaughn was advised to limit contact with others to essential tasks such as getting food, medications, and medical care  Proper handwashing reviewed, and Hand sanitzer when washing is not available  If the patient develops symptoms of COVID 19, the patient should call the office as soon as possible  Patient was offered a video visit today  Patient accepted video visit  If the Video visit was refused, the following applies: It was my intent to perform this visit via video technology but the patient was not able to do a video connection so the visit was completed via audio telephone only  Examination today was by video/telephone visit  Observational measures that are included were noted on video or by patient/guardian description  Vital signs are include in this visit were obtained by the patient, using their own equipment at home  Review of Systems   Constitutional: Negative for activity change, appetite change, chills, diaphoresis, fatigue and fever  HENT: Positive for postnasal drip  Negative for ear pain, rhinorrhea and sore throat  Respiratory: Negative for cough and shortness of breath  Cardiovascular: Negative for chest pain  Gastrointestinal: Negative for diarrhea and nausea  Neurological: Negative for dizziness and headaches  Video Exam    Vitals:    09/30/20 0855   BP: 130/80   Pulse: 68   Resp: 20   Temp: (!) 96 5 °F (35 8 °C)   TempSrc: Oral   SpO2: 97%   Weight: 67 6 kg (149 lb)         Reagan appears healthy, no distress, cooperative, smiling  Physical Exam  Vitals signs reviewed  Constitutional:       Appearance: Normal appearance  Eyes:      General:         Right eye: No discharge  Left eye: No discharge  Pulmonary:      Effort: Pulmonary effort is normal  No respiratory distress  Lymphadenopathy:      Cervical: No cervical adenopathy  Neurological:      Mental Status: He is alert  Psychiatric:         Mood and Affect: Mood normal           VIRTUAL VISIT DISCLAIMER    Miguel Romeo acknowledges that he has consented to an online visit or consultation  He understands that the online visit is based solely on information provided by him, and that, in the absence of a face-to-face physical evaluation by the physician, the diagnosis he receives is both limited and provisional in terms of accuracy and completeness  This is not intended to replace a full medical face-to-face evaluation by the physician  Miguel Walters understands and accepts these terms

## 2020-10-01 LAB — SARS-COV-2 RNA SPEC QL NAA+PROBE: NOT DETECTED

## 2020-10-02 ENCOUNTER — TELEPHONE (OUTPATIENT)
Dept: FAMILY MEDICINE CLINIC | Facility: CLINIC | Age: 45
End: 2020-10-02

## 2020-12-03 ENCOUNTER — VBI (OUTPATIENT)
Dept: ADMINISTRATIVE | Facility: OTHER | Age: 45
End: 2020-12-03

## 2020-12-07 LAB
IGA SERPL-MCNC: 185 MG/DL (ref 47–310)
TTG IGA SER-ACNC: 1 U/ML

## 2020-12-10 ENCOUNTER — TELEPHONE (OUTPATIENT)
Dept: FAMILY MEDICINE CLINIC | Facility: CLINIC | Age: 45
End: 2020-12-10

## 2020-12-18 ENCOUNTER — APPOINTMENT (EMERGENCY)
Dept: RADIOLOGY | Facility: HOSPITAL | Age: 45
End: 2020-12-18
Payer: OTHER MISCELLANEOUS

## 2020-12-18 ENCOUNTER — HOSPITAL ENCOUNTER (EMERGENCY)
Facility: HOSPITAL | Age: 45
Discharge: HOME | End: 2020-12-18
Attending: EMERGENCY MEDICINE
Payer: OTHER MISCELLANEOUS

## 2020-12-18 VITALS
RESPIRATION RATE: 16 BRPM | DIASTOLIC BLOOD PRESSURE: 110 MMHG | BODY MASS INDEX: 26.39 KG/M2 | SYSTOLIC BLOOD PRESSURE: 175 MMHG | HEART RATE: 76 BPM | WEIGHT: 149 LBS | OXYGEN SATURATION: 100 % | TEMPERATURE: 98.1 F

## 2020-12-18 DIAGNOSIS — S83.92XA LEFT KNEE SPRAIN: ICD-10-CM

## 2020-12-18 DIAGNOSIS — S33.8XXA SPRAIN OF GROIN, INITIAL ENCOUNTER: ICD-10-CM
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
## 2020-12-18 PROCEDURE — 99283 EMERGENCY DEPT VISIT LOW MDM: CPT

## 2020-12-18 PROCEDURE — 73564 X-RAY EXAM KNEE 4 OR MORE: CPT

## 2020-12-18 PROCEDURE — 99284 EMERGENCY DEPT VISIT MOD MDM: CPT | Performed by: EMERGENCY MEDICINE

## 2020-12-18 RX ORDER — NAPROXEN 500 MG/1
500 TABLET ORAL 2 TIMES DAILY WITH MEALS
Qty: 30 TABLET | Refills: 0 | Status: SHIPPED | OUTPATIENT
Start: 2020-12-18 | End: 2021-11-26 | Stop reason: ALTCHOICE

## 2020-12-18 RX ORDER — ACETAMINOPHEN 325 MG/1
975 TABLET ORAL ONCE
Status: DISCONTINUED | OUTPATIENT
Start: 2020-12-18 | End: 2020-12-18 | Stop reason: HOSPADM

## 2021-01-05 DIAGNOSIS — N05.2 MEMBRANOUS GLOMERULONEPHRITIS: ICD-10-CM

## 2021-01-05 RX ORDER — CYCLOSPORINE 100 MG/ML
SOLUTION ORAL
Qty: 50 ML | Refills: 5 | Status: SHIPPED | OUTPATIENT
Start: 2021-01-05 | End: 2021-10-07 | Stop reason: ALTCHOICE

## 2021-01-14 ENCOUNTER — TELEPHONE (OUTPATIENT)
Dept: NEPHROLOGY | Facility: CLINIC | Age: 46
End: 2021-01-14

## 2021-02-02 DIAGNOSIS — I10 ESSENTIAL HYPERTENSION: ICD-10-CM

## 2021-02-02 RX ORDER — AMLODIPINE BESYLATE 5 MG/1
TABLET ORAL
Qty: 90 TABLET | Refills: 1 | Status: SHIPPED | OUTPATIENT
Start: 2021-02-02 | End: 2021-08-30

## 2021-02-17 ENCOUNTER — APPOINTMENT (OUTPATIENT)
Dept: RADIOLOGY | Facility: CLINIC | Age: 46
End: 2021-02-17
Payer: OTHER MISCELLANEOUS

## 2021-02-17 ENCOUNTER — OCCMED (OUTPATIENT)
Dept: URGENT CARE | Facility: CLINIC | Age: 46
End: 2021-02-17

## 2021-02-17 DIAGNOSIS — S39.92XA INJURY OF BACK, INITIAL ENCOUNTER: ICD-10-CM

## 2021-02-17 DIAGNOSIS — S39.92XA INJURY OF BACK, INITIAL ENCOUNTER: Primary | ICD-10-CM

## 2021-02-17 PROCEDURE — 72100 X-RAY EXAM L-S SPINE 2/3 VWS: CPT

## 2021-02-22 ENCOUNTER — APPOINTMENT (OUTPATIENT)
Dept: URGENT CARE | Facility: CLINIC | Age: 46
End: 2021-02-22
Payer: OTHER MISCELLANEOUS
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
## 2021-02-22 DIAGNOSIS — S39.012D LUMBAR SPINE STRAIN, SUBSEQUENT ENCOUNTER: Primary | ICD-10-CM

## 2021-02-22 PROCEDURE — 99213 OFFICE O/P EST LOW 20 MIN: CPT | Performed by: PHYSICIAN ASSISTANT

## 2021-02-23 DIAGNOSIS — N05.2 MEMBRANOUS GLOMERULONEPHRITIS: ICD-10-CM

## 2021-02-23 RX ORDER — LOSARTAN POTASSIUM 50 MG/1
TABLET ORAL
Qty: 90 TABLET | Refills: 1 | Status: SHIPPED | OUTPATIENT
Start: 2021-02-23 | End: 2021-08-02

## 2021-03-01 ENCOUNTER — APPOINTMENT (OUTPATIENT)
Dept: URGENT CARE | Facility: CLINIC | Age: 46
End: 2021-03-01
Payer: OTHER MISCELLANEOUS

## 2021-03-01 PROCEDURE — 99213 OFFICE O/P EST LOW 20 MIN: CPT

## 2021-03-15 ENCOUNTER — OFFICE VISIT (OUTPATIENT)
Dept: FAMILY MEDICINE CLINIC | Facility: CLINIC | Age: 46
End: 2021-03-15
Payer: COMMERCIAL

## 2021-03-15 VITALS
BODY MASS INDEX: 26.4 KG/M2 | RESPIRATION RATE: 16 BRPM | SYSTOLIC BLOOD PRESSURE: 134 MMHG | HEART RATE: 80 BPM | HEIGHT: 63 IN | DIASTOLIC BLOOD PRESSURE: 78 MMHG | WEIGHT: 149 LBS | TEMPERATURE: 98.5 F

## 2021-03-15 DIAGNOSIS — Z00.00 WELL ADULT EXAM: ICD-10-CM

## 2021-03-15 DIAGNOSIS — Z11.1 ENCOUNTER FOR PPD TEST: Primary | ICD-10-CM

## 2021-03-15 DIAGNOSIS — M54.50 CHRONIC BILATERAL LOW BACK PAIN WITHOUT SCIATICA: ICD-10-CM

## 2021-03-15 DIAGNOSIS — G89.29 CHRONIC BILATERAL LOW BACK PAIN WITHOUT SCIATICA: ICD-10-CM

## 2021-03-15 DIAGNOSIS — J45.40 MODERATE PERSISTENT ASTHMA, UNSPECIFIED WHETHER COMPLICATED: ICD-10-CM

## 2021-03-15 PROCEDURE — 86580 TB INTRADERMAL TEST: CPT | Performed by: FAMILY MEDICINE

## 2021-03-15 PROCEDURE — 99396 PREV VISIT EST AGE 40-64: CPT | Performed by: FAMILY MEDICINE

## 2021-03-15 RX ORDER — ALBUTEROL SULFATE 90 UG/1
2 AEROSOL, METERED RESPIRATORY (INHALATION) EVERY 4 HOURS PRN
Qty: 1 INHALER | Refills: 5 | Status: SHIPPED | OUTPATIENT
Start: 2021-03-15 | End: 2022-04-13

## 2021-03-15 RX ORDER — MOMETASONE FUROATE AND FORMOTEROL FUMARATE DIHYDRATE 200; 5 UG/1; UG/1
2 AEROSOL RESPIRATORY (INHALATION) 2 TIMES DAILY
Qty: 1 INHALER | Refills: 5 | Status: SHIPPED | OUTPATIENT
Start: 2021-03-15 | End: 2022-07-26

## 2021-03-15 RX ORDER — CYCLOBENZAPRINE HCL 10 MG
10 TABLET ORAL 3 TIMES DAILY PRN
Qty: 30 TABLET | Refills: 2 | Status: SHIPPED | OUTPATIENT
Start: 2021-03-15 | End: 2022-03-16

## 2021-03-15 NOTE — PROGRESS NOTES
Assessment/Plan     Healthy male exam      1    2  Patient Counseling:  --Nutrition: Stressed importance of moderation in sodium/caffeine intake, saturated fat and cholesterol, caloric balance, sufficient intake of fresh fruits, vegetables, fiber,    --Exercise: Stressed the importance of regular exercise  --Substance Abuse: no concerns   --Sexuality:no concerns  --Injury prevention: Discussed safety belts, safety helmets, smoke detector   --Dental health: Discussed importance of regular tooth brushing, flossing, and dental visits  [facility] dental , needs eye  --Immunizations reviewed  --Discussed benefits of screening colonoscopy  , doesn't need until 47  --After hours service discussed with patient    3  Discussed the patient's BMI with him  The BMI is above average; BMI management plan is completed  4  Follow up as needed for acute illness  Subjective     Baldomero Rose is a 39 y o  male and is here for a comprehensive physical exam  The patient reports no problems  Do you take any herbs or supplements that were not prescribed by a doctor? no  Are you taking calcium supplements? no  Are you taking aspirin daily? no     History:  Patient receives prostate care outside our clinic  Date last prostate exam: never  Date last PSA: never    The following portions of the patient's history were reviewed and updated as appropriate: allergies, current medications, past family history, past medical history, past social history, past surgical history and problem list   no concerns    Review of Systems  Do you have pain that bothers you in your daily life? no  Musculoskeletal:positive for arthralgias   Took 2 falls-hurt  Knee with one which is better, fell on ice and strained back-did PT    Objective     /78 (BP Location: Left arm, Patient Position: Sitting, Cuff Size: Standard)   Pulse 80   Temp 98 5 °F (36 9 °C) (Temporal)   Resp 16   Ht 5' 3" (1 6 m)   Wt 67 6 kg (149 lb)   BMI 26 39 kg/m²     General Appearance:    Alert, cooperative, no distress, appears stated age   Head:    Normocephalic, without obvious abnormality, atraumatic   Eyes:    PERRL, conjunctiva/corneas clear, EOM's intact, fundi     benign, both eyes        Ears:    Normal TM's and external ear canals, both ears   Nose:   Nares normal, septum midline, mucosa normal, no drainage    or sinus tenderness   Throat:   Lips, mucosa, and tongue normal; teeth and gums normal   Neck:   Supple, symmetrical, trachea midline, no adenopathy;        thyroid:  No enlargement/tenderness/nodules; no carotid    bruit or JVD   Back:     Symmetric, no curvature, ROM normal, no CVA tenderness   Lungs:     Clear to auscultation bilaterally, respirations unlabored   Chest wall:    No tenderness or deformity   Heart:    Regular rate and rhythm, S1 and S2 normal, no murmur, rub   or gallop   Abdomen:     Soft, non-tender, bowel sounds active all four quadrants,     no masses, no organomegaly           Extremities:   Extremities normal, atraumatic, no cyanosis or edema   Pulses:   2+ and symmetric all extremities   Skin:   Skin color, texture, turgor normal, no rashes or lesions   Lymph nodes:   Cervical, supraclavicular, and axillary nodes normal          Assessment and Plan:    Problem List Items Addressed This Visit        Respiratory    Moderate persistent asthma    Relevant Medications    albuterol (PROVENTIL HFA,VENTOLIN HFA) 90 mcg/act inhaler    mometasone-formoterol (Dulera) 200-5 MCG/ACT inhaler       Other    Chronic bilateral low back pain without sciatica    Relevant Medications    cyclobenzaprine (FLEXERIL) 10 mg tablet      Other Visit Diagnoses     Encounter for PPD test    -  Primary    Relevant Orders    TB Skin Test (Completed)    Well adult exam        Relevant Orders    Lipid panel    Comprehensive metabolic panel                 Diagnoses and all orders for this visit:    Encounter for PPD test  -     TB Skin Test    Well adult exam  -     Lipid panel; Future  -     Comprehensive metabolic panel; Future    Moderate persistent asthma, unspecified whether complicated  -     albuterol (PROVENTIL HFA,VENTOLIN HFA) 90 mcg/act inhaler; Inhale 2 puffs every 4 (four) hours as needed for wheezing  -     mometasone-formoterol (Dulera) 200-5 MCG/ACT inhaler; Inhale 2 puffs 2 (two) times a day Rinse mouth after use  Chronic bilateral low back pain without sciatica  -     cyclobenzaprine (FLEXERIL) 10 mg tablet; Take 1 tablet (10 mg total) by mouth 3 (three) times a day as needed for muscle spasms              Subjective:      Patient ID: Jamal Livingston is a 39 y o  male  CC:    Chief Complaint   Patient presents with    Physical Exam     pt here for a work physical and needs a ppd placement   R>Cesar       HPI:    HPI    The following portions of the patient's history were reviewed and updated as appropriate: allergies, current medications, past family history, past medical history, past social history, past surgical history and problem list       Review of Systems      Data to review:       Objective:    Vitals:    03/15/21 0809   BP: 134/78   BP Location: Left arm   Patient Position: Sitting   Cuff Size: Standard   Pulse: 80   Resp: 16   Temp: 98 5 °F (36 9 °C)   TempSrc: Temporal   Weight: 67 6 kg (149 lb)   Height: 5' 3" (1 6 m)        Physical Exam

## 2021-03-17 ENCOUNTER — TELEPHONE (OUTPATIENT)
Dept: FAMILY MEDICINE CLINIC | Facility: CLINIC | Age: 46
End: 2021-03-17

## 2021-03-17 NOTE — TELEPHONE ENCOUNTER
PT CALLING WITH PPD RESULTS OF NEG 0MG,  THE RESULTS WILL BE FAXED [phone]    ANY QUESTIONS PT CAN BE REACHED [phone]

## 2021-03-25 LAB
ALBUMIN SERPL-MCNC: 3.9 G/DL (ref 3.6–5.1)
ALBUMIN/GLOB SERPL: 1.8 (CALC) (ref 1–2.5)
ALP SERPL-CCNC: 55 U/L (ref 36–130)
ALT SERPL-CCNC: 25 U/L (ref 9–46)
AST SERPL-CCNC: 26 U/L (ref 10–40)
BILIRUB SERPL-MCNC: 0.5 MG/DL (ref 0.2–1.2)
BUN SERPL-MCNC: 13 MG/DL (ref 7–25)
BUN/CREAT SERPL: NORMAL (CALC) (ref 6–22)
CALCIUM SERPL-MCNC: 9.2 MG/DL (ref 8.6–10.3)
CHLORIDE SERPL-SCNC: 108 MMOL/L (ref 98–110)
CHOLEST SERPL-MCNC: 141 MG/DL
CHOLEST/HDLC SERPL: 2.8 (CALC)
CO2 SERPL-SCNC: 27 MMOL/L (ref 20–32)
CREAT SERPL-MCNC: 0.97 MG/DL (ref 0.6–1.35)
GLOBULIN SER CALC-MCNC: 2.2 G/DL (CALC) (ref 1.9–3.7)
GLUCOSE SERPL-MCNC: 85 MG/DL (ref 65–99)
HDLC SERPL-MCNC: 51 MG/DL
LDLC SERPL CALC-MCNC: 77 MG/DL (CALC)
NONHDLC SERPL-MCNC: 90 MG/DL (CALC)
POTASSIUM SERPL-SCNC: 4.1 MMOL/L (ref 3.5–5.3)
PROT SERPL-MCNC: 6.1 G/DL (ref 6.1–8.1)
SL AMB EGFR AFRICAN AMERICAN: 110 ML/MIN/1.73M2
SL AMB EGFR NON AFRICAN AMERICAN: 95 ML/MIN/1.73M2
SODIUM SERPL-SCNC: 141 MMOL/L (ref 135–146)
TRIGL SERPL-MCNC: 52 MG/DL

## 2021-03-26 LAB
CREAT UR-MCNC: 178 MG/DL (ref 20–320)
PROT UR-MCNC: 137 MG/DL (ref 5–25)
PROT/CREAT UR: 0.77 MG/MG CREAT (ref 0.02–0.13)
PROT/CREAT UR: 770 MG/G CREAT (ref 22–128)

## 2021-03-31 ENCOUNTER — OFFICE VISIT (OUTPATIENT)
Dept: NEPHROLOGY | Facility: CLINIC | Age: 46
End: 2021-03-31
Payer: COMMERCIAL

## 2021-03-31 VITALS
DIASTOLIC BLOOD PRESSURE: 80 MMHG | BODY MASS INDEX: 26.58 KG/M2 | HEART RATE: 70 BPM | WEIGHT: 150 LBS | HEIGHT: 63 IN | RESPIRATION RATE: 16 BRPM | SYSTOLIC BLOOD PRESSURE: 146 MMHG

## 2021-03-31 DIAGNOSIS — R80.8 OTHER PROTEINURIA: ICD-10-CM

## 2021-03-31 DIAGNOSIS — N02.2 MEMBRANOUS NEPHROPATHY DETERMINED BY BIOPSY: Primary | ICD-10-CM

## 2021-03-31 DIAGNOSIS — I10 ESSENTIAL HYPERTENSION: ICD-10-CM

## 2021-03-31 PROCEDURE — 1036F TOBACCO NON-USER: CPT | Performed by: INTERNAL MEDICINE

## 2021-03-31 PROCEDURE — 99214 OFFICE O/P EST MOD 30 MIN: CPT | Performed by: INTERNAL MEDICINE

## 2021-03-31 PROCEDURE — 3008F BODY MASS INDEX DOCD: CPT | Performed by: INTERNAL MEDICINE

## 2021-03-31 NOTE — LETTER
March 31, 2021     Bryson Slade, [address]    Patient: Handy Kovacs   YOB: 1975   Date of Visit: 3/31/2021       Dear Dr Pete Bashir:    Thank you for referring Maricruz Alba to me for evaluation  Below are my notes for this consultation  If you have questions, please do not hesitate to call me  I look forward to following your patient along with you  Sincerely,        Ruthie Cifuentes MD        CC: No Recipients  Ruthie Cifuentes MD  3/31/2021 10:04 AM  Sign when Signing Visit  NEPHROLOGY PROGRESS NOTE    Handy Kovacs 39 y o  male MRN: [id number]  Unit/Bed#:  Encounter: [id number]  Reason for Consult:  Membranous GN    The patient is here for routine follow-up he has been doing well  He notices blood pressures been under good control he has had no significant swelling  He did have a couple injuries that were musculoskeletal at work and he is back to work on [employer] duty  Otherwise no complaints and we reviewed his medications  ASSESSMENT/PLAN:  1  Renal    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  We have treated him with cyclosporin and he has done very well  Protein estimation is or down around 700 mg and creatinine has remained normal at 0 7  His most recent labs include an albumin and lipid profile both of which are normal so that tells me that the nephrotic syndrome is in remission presently  He is on an ARB as well as cyclosporin  With primary membranous GN phospho lipase to a receptor antibodies can be followed  When there was active disease they were positive  His last couple evaluations have been negative  Cyclosporin does not reduced antibody production so hopefully this just means that his body sort of in remission and the cyclosporin an ARB ear minimizing proteinuria    Going to continue with his current medications recheck the antibody titers labs and protein estimation in 6 months and then will decide if we should taper off cyclosporin or continue  Blood pressure is well controlled continue current medications  He was told to call if there is any problems or concerns before next visit  SUBJECTIVE:  Review of Systems   Constitution: Negative for chills, decreased appetite, fever and malaise/fatigue  HENT: Negative  Eyes: Negative  Cardiovascular: Negative  Negative for chest pain, dyspnea on exertion, leg swelling and orthopnea  Respiratory: Negative  Negative for cough, shortness of breath, sputum production and wheezing  Gastrointestinal: Negative  Negative for bloating, abdominal pain, diarrhea, nausea and vomiting  Genitourinary: Negative for dysuria, flank pain, hematuria and incomplete emptying  Neurological: Negative for dizziness, focal weakness, headaches and weakness  Psychiatric/Behavioral: Negative for altered mental status, depression, hallucinations and hypervigilance  OBJECTIVE:  Current Weight: Weight - Scale: 68 kg (150 lb)  [email or web address]     Blood pressure 146/80, pulse 70, resp  rate 16, height 5' 3" (1 6 m), weight 68 kg (150 lb)  , Body mass index is 26 57 kg/m²  [unfilled]    Physical Exam: /80 (BP Location: Left arm, Patient Position: Sitting, Cuff Size: Standard)   Pulse 70   Resp 16   Ht 5' 3" (1 6 m)   Wt 68 kg (150 lb)   BMI 26 57 kg/m²   Physical Exam  Constitutional:       General: He is not in acute distress  Appearance: Normal appearance  He is not ill-appearing or diaphoretic  HENT:      Head: Normocephalic and atraumatic  Nose:      Comments: Mask     Mouth/Throat:      Comments: Mask  Eyes:      General: No scleral icterus  Extraocular Movements: Extraocular movements intact  Neck:      Musculoskeletal: Normal range of motion and neck supple  Cardiovascular:      Rate and Rhythm: Normal rate and regular rhythm  Heart sounds: No friction rub  No gallop         Comments: No edema  Pulmonary:      Effort: Pulmonary effort is normal  No respiratory distress  Breath sounds: Normal breath sounds  No wheezing, rhonchi or rales  Abdominal:      General: Bowel sounds are normal  There is no distension  Palpations: Abdomen is soft  Tenderness: There is no abdominal tenderness  There is no rebound  Neurological:      General: No focal deficit present  Mental Status: He is alert and oriented to person, place, and time  Mental status is at baseline  Psychiatric:         Mood and Affect: Mood normal          Behavior: Behavior normal          Thought Content:  Thought content normal          Judgment: Judgment normal          Medications:    Current Outpatient Medications:     albuterol (2 5 mg/3 mL) 0 083 % nebulizer solution, Take 1 vial (2 5 mg total) by nebulization every 4 (four) hours as needed for wheezing or shortness of breath, Disp: 100 mL, Rfl: 5    albuterol (PROVENTIL HFA,VENTOLIN HFA) 90 mcg/act inhaler, Inhale 2 puffs every 4 (four) hours as needed for wheezing, Disp: 1 Inhaler, Rfl: 5    losartan (COZAAR) 50 mg tablet, TAKE 1 TABLET BY MOUTH EVERY DAY, Disp: 90 tablet, Rfl: 1    mometasone-formoterol (Dulera) 200-5 MCG/ACT inhaler, Inhale 2 puffs 2 (two) times a day Rinse mouth after use , Disp: 1 Inhaler, Rfl: 5    SandIMMUNE 100 MG/ML solution, TAKE 1 ML (100 MG TOTAL) BY MOUTH EVERY 12 (TWELVE) HOURS, Disp: 50 mL, Rfl: 5    amLODIPine (NORVASC) 5 mg tablet, TAKE 1 TABLET BY MOUTH EVERY DAY (Patient not taking: Reported on 3/31/2021), Disp: 90 tablet, Rfl: 1    cyclobenzaprine (FLEXERIL) 10 mg tablet, Take 1 tablet (10 mg total) by mouth 3 (three) times a day as needed for muscle spasms (Patient not taking: Reported on 3/31/2021), Disp: 30 tablet, Rfl: 2    ergocalciferol (VITAMIN D2) 50,000 units, Take 50,000 Units by mouth once a week, Disp: , Rfl: 11    naproxen (NAPROSYN) 500 mg tablet, Take 1 tablet (500 mg total) by mouth 2 (two) times a day with meals (Patient not taking: Reported on 3/31/2021), Disp: 30 tablet, Rfl: 0    Laboratory Results:  Lab Results   Component Value Date    WBC 4 53 02/21/2019    HGB 14 3 02/21/2019    HCT 43 3 02/21/2019    MCV 87 02/21/2019     02/21/2019     Lab Results   Component Value Date    SODIUM 141 03/25/2021    K 4 1 03/25/2021     03/25/2021    CO2 27 03/25/2021    BUN 13 03/25/2021    CREATININE 0 97 03/25/2021    GLUC 85 03/25/2021    CALCIUM 9 2 03/25/2021     Lab Results   Component Value Date    CALCIUM 9 2 03/25/2021     No results found for: LABPROT

## 2021-03-31 NOTE — PATIENT INSTRUCTIONS
You are here for follow-up sounds like you been doing very well except some work related injuries  With respect to your underlying condition of membranous GN you seem to be doing very well clinically there is no swelling your albumin is normalized cholesterol is normal protein is very low in the urine and your creatinine is 0 7 which is still normal     For now continue to cyclosporin  Monitor your blood pressure on losartan    Please call if there is any concerns before next visit    Labs and follow-up as scheduled

## 2021-03-31 NOTE — PROGRESS NOTES
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  We have treated him with cyclosporin and he has done very well  Protein estimation is or down around 700 mg and creatinine has remained normal at 0 7  His most recent labs include an albumin and lipid profile both of which are normal so that tells me that the nephrotic syndrome is in remission presently  He is on an ARB as well as cyclosporin  With primary membranous GN phospho lipase to a receptor antibodies can be followed  When there was active disease they were positive  His last couple evaluations have been negative  Cyclosporin does not reduced antibody production so hopefully this just means that his body sort of in remission and the cyclosporin an ARB ear minimizing proteinuria  Going to continue with his current medications recheck the antibody titers labs and protein estimation in 6 months and then will decide if we should taper off cyclosporin or continue  Blood pressure is well controlled continue current medications  He was told to call if there is any problems or concerns before next visit  SUBJECTIVE:  Review of Systems   Constitution: Negative for chills, decreased appetite, fever and malaise/fatigue  HENT: Negative  Eyes: Negative  Cardiovascular: Negative  Negative for chest pain, dyspnea on exertion, leg swelling and orthopnea  Respiratory: Negative    Negative for cough, shortness of breath, sputum production and wheezing  Gastrointestinal: Negative  Negative for bloating, abdominal pain, diarrhea, nausea and vomiting  Genitourinary: Negative for dysuria, flank pain, hematuria and incomplete emptying  Neurological: Negative for dizziness, focal weakness, headaches and weakness  Psychiatric/Behavioral: Negative for altered mental status, depression, hallucinations and hypervigilance  OBJECTIVE:  Current Weight: Weight - Scale: 68 kg (150 lb)  [email or web address] com:     Blood pressure 146/80, pulse 70, resp  rate 16, height 5' 3" (1 6 m), weight 68 kg (150 lb)  , Body mass index is 26 57 kg/m²  [unfilled]    Physical Exam: /80 (BP Location: Left arm, Patient Position: Sitting, Cuff Size: Standard)   Pulse 70   Resp 16   Ht 5' 3" (1 6 m)   Wt 68 kg (150 lb)   BMI 26 57 kg/m²   Physical Exam  Constitutional:       General: He is not in acute distress  Appearance: Normal appearance  He is not ill-appearing or diaphoretic  HENT:      Head: Normocephalic and atraumatic  Nose:      Comments: Mask     Mouth/Throat:      Comments: Mask  Eyes:      General: No scleral icterus  Extraocular Movements: Extraocular movements intact  Neck:      Musculoskeletal: Normal range of motion and neck supple  Cardiovascular:      Rate and Rhythm: Normal rate and regular rhythm  Heart sounds: No friction rub  No gallop  Comments: No edema  Pulmonary:      Effort: Pulmonary effort is normal  No respiratory distress  Breath sounds: Normal breath sounds  No wheezing, rhonchi or rales  Abdominal:      General: Bowel sounds are normal  There is no distension  Palpations: Abdomen is soft  Tenderness: There is no abdominal tenderness  There is no rebound  Neurological:      General: No focal deficit present  Mental Status: He is alert and oriented to person, place, and time  Mental status is at baseline     Psychiatric:         Mood and Affect: Mood normal  Behavior: Behavior normal          Thought Content:  Thought content normal          Judgment: Judgment normal          Medications:    Current Outpatient Medications:     albuterol (2 5 mg/3 mL) 0 083 % nebulizer solution, Take 1 vial (2 5 mg total) by nebulization every 4 (four) hours as needed for wheezing or shortness of breath, Disp: 100 mL, Rfl: 5    albuterol (PROVENTIL HFA,VENTOLIN HFA) 90 mcg/act inhaler, Inhale 2 puffs every 4 (four) hours as needed for wheezing, Disp: 1 Inhaler, Rfl: 5    losartan (COZAAR) 50 mg tablet, TAKE 1 TABLET BY MOUTH EVERY DAY, Disp: 90 tablet, Rfl: 1    mometasone-formoterol (Dulera) 200-5 MCG/ACT inhaler, Inhale 2 puffs 2 (two) times a day Rinse mouth after use , Disp: 1 Inhaler, Rfl: 5    SandIMMUNE 100 MG/ML solution, TAKE 1 ML (100 MG TOTAL) BY MOUTH EVERY 12 (TWELVE) HOURS, Disp: 50 mL, Rfl: 5    amLODIPine (NORVASC) 5 mg tablet, TAKE 1 TABLET BY MOUTH EVERY DAY (Patient not taking: Reported on 3/31/2021), Disp: 90 tablet, Rfl: 1    cyclobenzaprine (FLEXERIL) 10 mg tablet, Take 1 tablet (10 mg total) by mouth 3 (three) times a day as needed for muscle spasms (Patient not taking: Reported on 3/31/2021), Disp: 30 tablet, Rfl: 2    ergocalciferol (VITAMIN D2) 50,000 units, Take 50,000 Units by mouth once a week, Disp: , Rfl: 11    naproxen (NAPROSYN) 500 mg tablet, Take 1 tablet (500 mg total) by mouth 2 (two) times a day with meals (Patient not taking: Reported on 3/31/2021), Disp: 30 tablet, Rfl: 0    Laboratory Results:  Lab Results   Component Value Date    WBC 4 53 02/21/2019    HGB 14 3 02/21/2019    HCT 43 3 02/21/2019    MCV 87 02/21/2019     02/21/2019     Lab Results   Component Value Date    SODIUM 141 03/25/2021    K 4 1 03/25/2021     03/25/2021    CO2 27 03/25/2021    BUN 13 03/25/2021    CREATININE 0 97 03/25/2021    GLUC 85 03/25/2021    CALCIUM 9 2 03/25/2021     Lab Results   Component Value Date    CALCIUM 9 2 03/25/2021     No results found for: LABPROT

## 2021-08-02 DIAGNOSIS — N05.2 MEMBRANOUS GLOMERULONEPHRITIS: ICD-10-CM

## 2021-08-02 RX ORDER — LOSARTAN POTASSIUM 50 MG/1
TABLET ORAL
Qty: 90 TABLET | Refills: 1 | Status: SHIPPED | OUTPATIENT
Start: 2021-08-02 | End: 2022-03-20

## 2021-08-10 ENCOUNTER — TELEPHONE (OUTPATIENT)
Dept: NEPHROLOGY | Facility: CLINIC | Age: 46
End: 2021-08-10

## 2021-08-30 DIAGNOSIS — I10 ESSENTIAL HYPERTENSION: ICD-10-CM

## 2021-08-30 RX ORDER — AMLODIPINE BESYLATE 5 MG/1
TABLET ORAL
Qty: 90 TABLET | Refills: 1 | Status: SHIPPED | OUTPATIENT
Start: 2021-08-30 | End: 2021-11-26 | Stop reason: ALTCHOICE

## 2021-09-30 ENCOUNTER — TELEPHONE (OUTPATIENT)
Dept: NEPHROLOGY | Facility: CLINIC | Age: 46
End: 2021-09-30

## 2021-10-01 ENCOUNTER — APPOINTMENT (OUTPATIENT)
Dept: LAB | Facility: CLINIC | Age: 46
End: 2021-10-01
Payer: COMMERCIAL

## 2021-10-01 DIAGNOSIS — N02.2 MEMBRANOUS NEPHROPATHY DETERMINED BY BIOPSY: ICD-10-CM

## 2021-10-01 DIAGNOSIS — Z00.00 WELL ADULT EXAM: ICD-10-CM

## 2021-10-01 LAB
ALBUMIN SERPL BCP-MCNC: 3.6 G/DL (ref 3.5–5)
ALP SERPL-CCNC: 65 U/L (ref 46–116)
ALT SERPL W P-5'-P-CCNC: 36 U/L (ref 12–78)
ANION GAP SERPL CALCULATED.3IONS-SCNC: 2 MMOL/L (ref 4–13)
AST SERPL W P-5'-P-CCNC: 29 U/L (ref 5–45)
BILIRUB SERPL-MCNC: 0.43 MG/DL (ref 0.2–1)
BUN SERPL-MCNC: 16 MG/DL (ref 5–25)
CALCIUM SERPL-MCNC: 9.2 MG/DL (ref 8.3–10.1)
CHLORIDE SERPL-SCNC: 107 MMOL/L (ref 100–108)
CHOLEST SERPL-MCNC: 147 MG/DL (ref 50–200)
CO2 SERPL-SCNC: 28 MMOL/L (ref 21–32)
CREAT SERPL-MCNC: 0.98 MG/DL (ref 0.6–1.3)
CREAT UR-MCNC: 60.6 MG/DL
GFR SERPL CREATININE-BSD FRML MDRD: 108 ML/MIN/1.73SQ M
GLUCOSE P FAST SERPL-MCNC: 101 MG/DL (ref 65–99)
HDLC SERPL-MCNC: 43 MG/DL
LDLC SERPL CALC-MCNC: 60 MG/DL (ref 0–100)
NONHDLC SERPL-MCNC: 104 MG/DL
POTASSIUM SERPL-SCNC: 3.9 MMOL/L (ref 3.5–5.3)
PROT SERPL-MCNC: 7.3 G/DL (ref 6.4–8.2)
PROT UR-MCNC: 49 MG/DL
PROT/CREAT UR: 0.81 MG/G{CREAT} (ref 0–0.1)
SODIUM SERPL-SCNC: 137 MMOL/L (ref 136–145)
TRIGL SERPL-MCNC: 220 MG/DL

## 2021-10-01 PROCEDURE — 82570 ASSAY OF URINE CREATININE: CPT

## 2021-10-01 PROCEDURE — 80053 COMPREHEN METABOLIC PANEL: CPT

## 2021-10-01 PROCEDURE — 80061 LIPID PANEL: CPT

## 2021-10-01 PROCEDURE — 84156 ASSAY OF PROTEIN URINE: CPT

## 2021-10-01 PROCEDURE — 36415 COLL VENOUS BLD VENIPUNCTURE: CPT

## 2021-10-07 ENCOUNTER — OFFICE VISIT (OUTPATIENT)
Dept: NEPHROLOGY | Facility: CLINIC | Age: 46
End: 2021-10-07
Payer: COMMERCIAL

## 2021-10-07 VITALS
WEIGHT: 145.9 LBS | SYSTOLIC BLOOD PRESSURE: 138 MMHG | RESPIRATION RATE: 16 BRPM | BODY MASS INDEX: 25.85 KG/M2 | HEART RATE: 70 BPM | HEIGHT: 63 IN | DIASTOLIC BLOOD PRESSURE: 90 MMHG

## 2021-10-07 DIAGNOSIS — N05.2 MEMBRANOUS GLOMERULONEPHRITIS: Primary | ICD-10-CM

## 2021-10-07 DIAGNOSIS — R80.8 OTHER PROTEINURIA: ICD-10-CM

## 2021-10-07 DIAGNOSIS — N02.2 MEMBRANOUS NEPHROPATHY DETERMINED BY BIOPSY: ICD-10-CM

## 2021-10-07 PROCEDURE — 1036F TOBACCO NON-USER: CPT | Performed by: INTERNAL MEDICINE

## 2021-10-07 PROCEDURE — 99214 OFFICE O/P EST MOD 30 MIN: CPT | Performed by: INTERNAL MEDICINE

## 2021-10-07 PROCEDURE — 3008F BODY MASS INDEX DOCD: CPT | Performed by: INTERNAL MEDICINE

## 2021-11-08 ENCOUNTER — VBI (OUTPATIENT)
Dept: ADMINISTRATIVE | Facility: OTHER | Age: 46
End: 2021-11-08

## 2021-11-26 ENCOUNTER — OFFICE VISIT (OUTPATIENT)
Dept: FAMILY MEDICINE CLINIC | Facility: CLINIC | Age: 46
End: 2021-11-26
Payer: COMMERCIAL

## 2021-11-26 VITALS
BODY MASS INDEX: 25.87 KG/M2 | OXYGEN SATURATION: 98 % | HEART RATE: 68 BPM | HEIGHT: 63 IN | WEIGHT: 146 LBS | SYSTOLIC BLOOD PRESSURE: 132 MMHG | DIASTOLIC BLOOD PRESSURE: 88 MMHG

## 2021-11-26 DIAGNOSIS — R19.7 DIARRHEA, UNSPECIFIED TYPE: Primary | ICD-10-CM

## 2021-11-26 DIAGNOSIS — R14.0 BLOATING: ICD-10-CM

## 2021-11-26 PROCEDURE — 3008F BODY MASS INDEX DOCD: CPT | Performed by: PHYSICIAN ASSISTANT

## 2021-11-26 PROCEDURE — 1036F TOBACCO NON-USER: CPT | Performed by: PHYSICIAN ASSISTANT

## 2021-11-26 PROCEDURE — 99214 OFFICE O/P EST MOD 30 MIN: CPT | Performed by: PHYSICIAN ASSISTANT

## 2021-12-14 ENCOUNTER — LAB (OUTPATIENT)
Dept: LAB | Facility: CLINIC | Age: 46
End: 2021-12-14
Payer: COMMERCIAL

## 2021-12-14 DIAGNOSIS — E78.1 HYPERTRIGLYCERIDEMIA: ICD-10-CM

## 2021-12-14 DIAGNOSIS — R14.0 BLOATING: ICD-10-CM

## 2021-12-14 DIAGNOSIS — N05.2 MEMBRANOUS GLOMERULONEPHRITIS: ICD-10-CM

## 2021-12-14 DIAGNOSIS — R19.7 DIARRHEA, UNSPECIFIED TYPE: ICD-10-CM

## 2021-12-14 LAB
ALBUMIN SERPL BCP-MCNC: 3.6 G/DL (ref 3.5–5)
ALP SERPL-CCNC: 61 U/L (ref 46–116)
ALT SERPL W P-5'-P-CCNC: 40 U/L (ref 12–78)
ANION GAP SERPL CALCULATED.3IONS-SCNC: 3 MMOL/L (ref 4–13)
AST SERPL W P-5'-P-CCNC: 25 U/L (ref 5–45)
BASOPHILS # BLD AUTO: 0.02 THOUSANDS/ΜL (ref 0–0.1)
BASOPHILS NFR BLD AUTO: 0 % (ref 0–1)
BILIRUB SERPL-MCNC: 0.42 MG/DL (ref 0.2–1)
BUN SERPL-MCNC: 14 MG/DL (ref 5–25)
CALCIUM SERPL-MCNC: 10 MG/DL (ref 8.3–10.1)
CHLORIDE SERPL-SCNC: 106 MMOL/L (ref 100–108)
CHOLEST SERPL-MCNC: 165 MG/DL
CO2 SERPL-SCNC: 30 MMOL/L (ref 21–32)
CREAT SERPL-MCNC: 0.93 MG/DL (ref 0.6–1.3)
CREAT UR-MCNC: 145 MG/DL
EOSINOPHIL # BLD AUTO: 0.14 THOUSAND/ΜL (ref 0–0.61)
EOSINOPHIL NFR BLD AUTO: 3 % (ref 0–6)
ERYTHROCYTE [DISTWIDTH] IN BLOOD BY AUTOMATED COUNT: 12.2 % (ref 11.6–15.1)
GLUCOSE P FAST SERPL-MCNC: 87 MG/DL (ref 65–99)
HCT VFR BLD AUTO: 45.9 % (ref 36.5–49.3)
HDLC SERPL-MCNC: 50 MG/DL
HGB BLD-MCNC: 14.8 G/DL (ref 12–17)
IMM GRANULOCYTES # BLD AUTO: 0 THOUSAND/UL (ref 0–0.2)
IMM GRANULOCYTES NFR BLD AUTO: 0 % (ref 0–2)
LDLC SERPL CALC-MCNC: 95 MG/DL (ref 0–100)
LYMPHOCYTES # BLD AUTO: 1.97 THOUSANDS/ΜL (ref 0.6–4.47)
LYMPHOCYTES NFR BLD AUTO: 39 % (ref 14–44)
MAGNESIUM SERPL-MCNC: 1.9 MG/DL (ref 1.6–2.6)
MCH RBC QN AUTO: 28.6 PG (ref 26.8–34.3)
MCHC RBC AUTO-ENTMCNC: 32.2 G/DL (ref 31.4–37.4)
MCV RBC AUTO: 89 FL (ref 82–98)
MONOCYTES # BLD AUTO: 0.46 THOUSAND/ΜL (ref 0.17–1.22)
MONOCYTES NFR BLD AUTO: 9 % (ref 4–12)
NEUTROPHILS # BLD AUTO: 2.43 THOUSANDS/ΜL (ref 1.85–7.62)
NEUTS SEG NFR BLD AUTO: 49 % (ref 43–75)
NONHDLC SERPL-MCNC: 115 MG/DL
NRBC BLD AUTO-RTO: 0 /100 WBCS
PLATELET # BLD AUTO: 176 THOUSANDS/UL (ref 149–390)
PMV BLD AUTO: 11.6 FL (ref 8.9–12.7)
POTASSIUM SERPL-SCNC: 3.9 MMOL/L (ref 3.5–5.3)
PROT SERPL-MCNC: 7 G/DL (ref 6.4–8.2)
PROT UR-MCNC: 236 MG/DL
PROT/CREAT UR: 1.63 MG/G{CREAT} (ref 0–0.1)
RBC # BLD AUTO: 5.17 MILLION/UL (ref 3.88–5.62)
SODIUM SERPL-SCNC: 139 MMOL/L (ref 136–145)
TRIGL SERPL-MCNC: 98 MG/DL
TSH SERPL DL<=0.05 MIU/L-ACNC: 1.43 UIU/ML (ref 0.36–3.74)
WBC # BLD AUTO: 5.02 THOUSAND/UL (ref 4.31–10.16)

## 2021-12-14 PROCEDURE — 87338 HPYLORI STOOL AG IA: CPT

## 2021-12-14 PROCEDURE — 84156 ASSAY OF PROTEIN URINE: CPT

## 2021-12-14 PROCEDURE — 87177 OVA AND PARASITES SMEARS: CPT

## 2021-12-14 PROCEDURE — 36415 COLL VENOUS BLD VENIPUNCTURE: CPT | Performed by: PHYSICIAN ASSISTANT

## 2021-12-14 PROCEDURE — 83735 ASSAY OF MAGNESIUM: CPT

## 2021-12-14 PROCEDURE — 80053 COMPREHEN METABOLIC PANEL: CPT

## 2021-12-14 PROCEDURE — 87209 SMEAR COMPLEX STAIN: CPT

## 2021-12-14 PROCEDURE — 87505 NFCT AGENT DETECTION GI: CPT

## 2021-12-14 PROCEDURE — 80061 LIPID PANEL: CPT

## 2021-12-14 PROCEDURE — 85025 COMPLETE CBC W/AUTO DIFF WBC: CPT | Performed by: PHYSICIAN ASSISTANT

## 2021-12-14 PROCEDURE — 83516 IMMUNOASSAY NONANTIBODY: CPT

## 2021-12-14 PROCEDURE — 82570 ASSAY OF URINE CREATININE: CPT

## 2021-12-14 PROCEDURE — 84443 ASSAY THYROID STIM HORMONE: CPT | Performed by: PHYSICIAN ASSISTANT

## 2021-12-15 LAB
CAMPYLOBACTER DNA SPEC NAA+PROBE: NORMAL
H PYLORI AG STL QL IA: NEGATIVE
SALMONELLA DNA SPEC QL NAA+PROBE: NORMAL
SHIGA TOXIN STX GENE SPEC NAA+PROBE: NORMAL
SHIGELLA DNA SPEC QL NAA+PROBE: NORMAL

## 2021-12-16 ENCOUNTER — TELEPHONE (OUTPATIENT)
Dept: NEPHROLOGY | Facility: CLINIC | Age: 46
End: 2021-12-16

## 2021-12-17 LAB — O+P STL CONC: NORMAL

## 2021-12-21 ENCOUNTER — TELEPHONE (OUTPATIENT)
Dept: FAMILY MEDICINE CLINIC | Facility: CLINIC | Age: 46
End: 2021-12-21

## 2021-12-29 ENCOUNTER — CONSULT (OUTPATIENT)
Dept: GASTROENTEROLOGY | Facility: CLINIC | Age: 46
End: 2021-12-29
Payer: COMMERCIAL

## 2021-12-29 VITALS
TEMPERATURE: 97.7 F | HEIGHT: 63 IN | DIASTOLIC BLOOD PRESSURE: 100 MMHG | BODY MASS INDEX: 26.08 KG/M2 | WEIGHT: 147.2 LBS | SYSTOLIC BLOOD PRESSURE: 140 MMHG

## 2021-12-29 DIAGNOSIS — R19.7 DIARRHEA, UNSPECIFIED TYPE: ICD-10-CM

## 2021-12-29 DIAGNOSIS — R10.84 GENERALIZED ABDOMINAL PAIN: Primary | ICD-10-CM

## 2021-12-29 DIAGNOSIS — R14.0 BLOATING: ICD-10-CM

## 2021-12-29 DIAGNOSIS — J45.40 MODERATE PERSISTENT ASTHMA, UNSPECIFIED WHETHER COMPLICATED: ICD-10-CM

## 2021-12-29 DIAGNOSIS — R11.0 NAUSEA: ICD-10-CM

## 2021-12-29 PROCEDURE — 3008F BODY MASS INDEX DOCD: CPT | Performed by: PHYSICIAN ASSISTANT

## 2021-12-29 PROCEDURE — 99244 OFF/OP CNSLTJ NEW/EST MOD 40: CPT | Performed by: PHYSICIAN ASSISTANT

## 2021-12-29 RX ORDER — ALBUTEROL SULFATE 2.5 MG/3ML
2.5 SOLUTION RESPIRATORY (INHALATION) EVERY 4 HOURS PRN
Qty: 100 ML | Refills: 5 | Status: SHIPPED | OUTPATIENT
Start: 2021-12-29

## 2022-01-06 ENCOUNTER — HOSPITAL ENCOUNTER (OUTPATIENT)
Dept: RADIOLOGY | Facility: HOSPITAL | Age: 47
Discharge: HOME | End: 2022-01-06
Payer: COMMERCIAL

## 2022-01-06 DIAGNOSIS — R19.7 DIARRHEA, UNSPECIFIED TYPE: ICD-10-CM

## 2022-01-06 DIAGNOSIS — R14.0 BLOATING: ICD-10-CM

## 2022-01-06 DIAGNOSIS — R10.84 GENERALIZED ABDOMINAL PAIN: ICD-10-CM

## 2022-01-06 DIAGNOSIS — R11.0 NAUSEA: ICD-10-CM

## 2022-01-06 PROCEDURE — 76700 US EXAM ABDOM COMPLETE: CPT

## 2022-01-25 ENCOUNTER — ANESTHESIA EVENT (OUTPATIENT)
Dept: ANESTHESIOLOGY | Facility: HOSPITAL | Age: 47
End: 2022-01-25

## 2022-01-25 ENCOUNTER — ANESTHESIA (OUTPATIENT)
Dept: ANESTHESIOLOGY | Facility: HOSPITAL | Age: 47
End: 2022-01-25

## 2022-01-25 ENCOUNTER — ANESTHESIA EVENT (OUTPATIENT)
Dept: GASTROENTEROLOGY | Facility: AMBULARY SURGERY CENTER | Age: 47
End: 2022-01-25

## 2022-02-07 ENCOUNTER — TELEPHONE (OUTPATIENT)
Dept: GASTROENTEROLOGY | Facility: AMBULARY SURGERY CENTER | Age: 47
End: 2022-02-07

## 2022-02-08 ENCOUNTER — HOSPITAL ENCOUNTER (OUTPATIENT)
Dept: GASTROENTEROLOGY | Facility: AMBULARY SURGERY CENTER | Age: 47
Setting detail: OUTPATIENT SURGERY
Discharge: HOME | End: 2022-02-08
Admitting: INTERNAL MEDICINE
Payer: COMMERCIAL

## 2022-02-08 ENCOUNTER — ANESTHESIA (OUTPATIENT)
Dept: GASTROENTEROLOGY | Facility: AMBULARY SURGERY CENTER | Age: 47
End: 2022-02-08

## 2022-02-08 VITALS
HEART RATE: 88 BPM | SYSTOLIC BLOOD PRESSURE: 144 MMHG | TEMPERATURE: 97.6 F | DIASTOLIC BLOOD PRESSURE: 91 MMHG | OXYGEN SATURATION: 99 % | HEIGHT: 63 IN | RESPIRATION RATE: 18 BRPM | BODY MASS INDEX: 26.08 KG/M2

## 2022-02-08 DIAGNOSIS — R14.0 BLOATING: ICD-10-CM

## 2022-02-08 DIAGNOSIS — R10.84 GENERALIZED ABDOMINAL PAIN: ICD-10-CM

## 2022-02-08 DIAGNOSIS — R19.7 DIARRHEA, UNSPECIFIED TYPE: ICD-10-CM

## 2022-02-08 DIAGNOSIS — R11.0 NAUSEA: ICD-10-CM

## 2022-02-08 PROCEDURE — 88305 TISSUE EXAM BY PATHOLOGIST: CPT | Performed by: PATHOLOGY

## 2022-02-08 PROCEDURE — 43239 EGD BIOPSY SINGLE/MULTIPLE: CPT | Performed by: INTERNAL MEDICINE

## 2022-02-08 PROCEDURE — 45330 DIAGNOSTIC SIGMOIDOSCOPY: CPT | Performed by: INTERNAL MEDICINE

## 2022-02-08 RX ORDER — PROPOFOL 10 MG/ML
INJECTION, EMULSION INTRAVENOUS AS NEEDED
Status: DISCONTINUED | OUTPATIENT
Start: 2022-02-08 | End: 2022-02-08

## 2022-02-08 RX ORDER — LIDOCAINE HYDROCHLORIDE 20 MG/ML
INJECTION, SOLUTION EPIDURAL; INFILTRATION; INTRACAUDAL; PERINEURAL AS NEEDED
Status: DISCONTINUED | OUTPATIENT
Start: 2022-02-08 | End: 2022-02-08

## 2022-02-08 RX ORDER — GLYCOPYRROLATE 0.2 MG/ML
INJECTION INTRAMUSCULAR; INTRAVENOUS AS NEEDED
Status: DISCONTINUED | OUTPATIENT
Start: 2022-02-08 | End: 2022-02-08

## 2022-02-08 RX ORDER — PROPOFOL 10 MG/ML
INJECTION, EMULSION INTRAVENOUS CONTINUOUS PRN
Status: DISCONTINUED | OUTPATIENT
Start: 2022-02-08 | End: 2022-02-08

## 2022-02-08 RX ORDER — SODIUM CHLORIDE, SODIUM LACTATE, POTASSIUM CHLORIDE, CALCIUM CHLORIDE 600; 310; 30; 20 MG/100ML; MG/100ML; MG/100ML; MG/100ML
125 INJECTION, SOLUTION INTRAVENOUS CONTINUOUS
Status: DISCONTINUED | OUTPATIENT
Start: 2022-02-08 | End: 2022-02-12 | Stop reason: HOSPADM

## 2022-02-08 RX ORDER — ONDANSETRON 2 MG/ML
4 INJECTION INTRAMUSCULAR; INTRAVENOUS ONCE AS NEEDED
Status: DISCONTINUED | OUTPATIENT
Start: 2022-02-08 | End: 2022-02-12 | Stop reason: HOSPADM

## 2022-02-08 RX ADMIN — PROPOFOL 50 MG: 10 INJECTION, EMULSION INTRAVENOUS at 10:16

## 2022-02-08 RX ADMIN — PROPOFOL 100 MG: 10 INJECTION, EMULSION INTRAVENOUS at 10:10

## 2022-02-08 RX ADMIN — GLYCOPYRROLATE 0.2 MG: 0.2 INJECTION, SOLUTION INTRAMUSCULAR; INTRAVENOUS at 10:08

## 2022-02-08 RX ADMIN — LIDOCAINE HYDROCHLORIDE 70 MG: 20 INJECTION, SOLUTION EPIDURAL; INFILTRATION; INTRACAUDAL at 10:08

## 2022-02-08 RX ADMIN — PROPOFOL 50 MG: 10 INJECTION, EMULSION INTRAVENOUS at 10:13

## 2022-02-08 RX ADMIN — SODIUM CHLORIDE, SODIUM LACTATE, POTASSIUM CHLORIDE, AND CALCIUM CHLORIDE: .6; .31; .03; .02 INJECTION, SOLUTION INTRAVENOUS at 10:01

## 2022-02-08 RX ADMIN — PROPOFOL 50 MG: 10 INJECTION, EMULSION INTRAVENOUS at 10:24

## 2022-02-08 RX ADMIN — PROPOFOL 150 MCG/KG/MIN: 10 INJECTION, EMULSION INTRAVENOUS at 10:25

## 2022-02-08 NOTE — H&P
History and Physical -  [facility]  Bozena Lopez 42 y o  male MRN: [id number]                  HPI: Bozena Lopez is a 42y o  year old male who presents for EGD and colonoscopy due to multiple complaints  REVIEW OF SYSTEMS: Per the HPI, and otherwise unremarkable      Historical Information   Past Medical History:   Diagnosis Date    Acute appendicitis     Asthma     Hypertension     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    Proteinuria      Past Surgical History:   Procedure Laterality Date    APPENDECTOMY      IR BIOPSY KIDNEY MASS  2/21/2019    SHOULDER SURGERY      TRIGGER FINGER RELEASE       Social History   Social History     Substance and Sexual Activity   Alcohol Use Yes    Comment: occasional     Social History     Substance and Sexual Activity   Drug Use No     Social History     Tobacco Use   Smoking Status Never Smoker   Smokeless Tobacco Never Used     Family History   Problem Relation Age of Onset    No Known Problems Mother     Diabetes Father     Diabetes Family     No Known Problems Sister     Ulcers Brother        Meds/Allergies       Current Outpatient Medications:     albuterol (2 5 mg/3 mL) 0 083 % nebulizer solution    albuterol (PROVENTIL HFA,VENTOLIN HFA) 90 mcg/act inhaler    losartan (COZAAR) 50 mg tablet    mometasone-formoterol (Dulera) 200-5 MCG/ACT inhaler    cyclobenzaprine (FLEXERIL) 10 mg tablet    Current Facility-Administered Medications:     lactated ringers infusion, 125 mL/hr, Intravenous, Continuous    Allergies   Allergen Reactions    Molds & Smuts Shortness Of Breath and Rash    Other Shortness Of Breath    Shellfish Allergy - Food Allergy Hives    Shellfish-Derived Products - Food Allergy Hives       Objective     BP (!) 196/85   Pulse 82   Resp 15   Ht 5' 3" (1 6 m)   SpO2 98%   BMI 26 08 kg/m²       PHYSICAL EXAM    Gen: NAD  Head: NCAT  CV: RRR  CHEST: Clear  ABD: soft, NT/ND  EXT: no edema      ASSESSMENT/PLAN:  This is a 42y o  year old male here for EGD and colonoscopy, and he is stable and optimized for his procedure

## 2022-02-08 NOTE — ANESTHESIA PREPROCEDURE EVALUATION
Procedure:  EGD  COLONOSCOPY    Relevant Problems   ANESTHESIA (within normal limits)      CARDIO   (+) Essential hypertension      /RENAL   (+) Membranous nephropathy determined by biopsy      MUSCULOSKELETAL   (+) Chronic bilateral low back pain without sciatica      PULMONARY   (+) Moderate persistent asthma        Physical Exam    Airway    Mallampati score: II  TM Distance: >3 FB  Neck ROM: full     Dental   No notable dental hx     Cardiovascular  Rhythm: regular, Rate: normal,     Pulmonary  Breath sounds clear to auscultation,     Other Findings        Anesthesia Plan  ASA Score- 2     Anesthesia Type- IV sedation with anesthesia with ASA Monitors  Additional Monitors:   Airway Plan:           Plan Factors-Exercise tolerance (METS): >4 METS  Chart reviewed  Existing labs reviewed  Patient summary reviewed  Patient is not a current smoker  Induction-     Postoperative Plan-     Informed Consent- Anesthetic plan and risks discussed with patient  I personally reviewed this patient with the CRNA  Discussed and agreed on the Anesthesia Plan with the CRNA  Ericka Villarreal

## 2022-02-08 NOTE — ANESTHESIA POSTPROCEDURE EVALUATION
Post-Op Assessment Note    CV Status:  Stable  Pain Score: 0    Pain management: adequate     Mental Status:  Alert and awake   Hydration Status:  Euvolemic   PONV Controlled:  Controlled   Airway Patency:  Patent      Post Op Vitals Reviewed: Yes      Staff: CRNA, Anesthesiologist         No complications documented      /81 (02/08/22 1038)    Temp 97 6 °F (36 4 °C) (02/08/22 1038)    Pulse (!) 121 (st) (02/08/22 1038)   Resp 20 (02/08/22 1038)    SpO2 98 % (02/08/22 1038)

## 2022-02-09 ENCOUNTER — APPOINTMENT (OUTPATIENT)
Dept: LAB | Facility: HOSPITAL | Age: 47
End: 2022-02-09
Payer: COMMERCIAL

## 2022-02-09 ENCOUNTER — TELEMEDICINE (OUTPATIENT)
Dept: FAMILY MEDICINE CLINIC | Facility: CLINIC | Age: 47
End: 2022-02-09
Payer: COMMERCIAL

## 2022-02-09 VITALS — TEMPERATURE: 98.6 F | HEIGHT: 63 IN | BODY MASS INDEX: 25.69 KG/M2 | WEIGHT: 145 LBS

## 2022-02-09 DIAGNOSIS — Z20.822 ENCOUNTER BY TELEHEALTH FOR SUSPECTED COVID-19: Primary | ICD-10-CM

## 2022-02-09 DIAGNOSIS — J45.40 MODERATE PERSISTENT ASTHMA WITHOUT COMPLICATION: ICD-10-CM

## 2022-02-09 PROCEDURE — 99214 OFFICE O/P EST MOD 30 MIN: CPT | Performed by: NURSE PRACTITIONER

## 2022-02-09 PROCEDURE — 1036F TOBACCO NON-USER: CPT | Performed by: NURSE PRACTITIONER

## 2022-02-09 PROCEDURE — U0005 INFEC AGEN DETEC AMPLI PROBE: HCPCS | Performed by: NURSE PRACTITIONER

## 2022-02-09 PROCEDURE — 3725F SCREEN DEPRESSION PERFORMED: CPT | Performed by: NURSE PRACTITIONER

## 2022-02-09 PROCEDURE — 3008F BODY MASS INDEX DOCD: CPT | Performed by: NURSE PRACTITIONER

## 2022-02-09 PROCEDURE — U0003 INFECTIOUS AGENT DETECTION BY NUCLEIC ACID (DNA OR RNA); SEVERE ACUTE RESPIRATORY SYNDROME CORONAVIRUS 2 (SARS-COV-2) (CORONAVIRUS DISEASE [COVID-19]), AMPLIFIED PROBE TECHNIQUE, MAKING USE OF HIGH THROUGHPUT TECHNOLOGIES AS DESCRIBED BY CMS-2020-01-R: HCPCS | Performed by: NURSE PRACTITIONER

## 2022-02-09 RX ORDER — PREDNISONE 10 MG/1
TABLET ORAL
Qty: 30 TABLET | Refills: 0 | Status: SHIPPED | OUTPATIENT
Start: 2022-02-09 | End: 2022-03-16

## 2022-02-09 RX ORDER — MULTIVITAMIN
1 CAPSULE ORAL DAILY
COMMUNITY

## 2022-02-09 NOTE — ASSESSMENT & PLAN NOTE
Prednisone taper ordered to hopefully prevent shortness of breath from worsening  Patient was advised to continue to use albuterol inhaler as needed

## 2022-02-09 NOTE — PATIENT INSTRUCTIONS
Problem List Items Addressed This Visit        Respiratory    Moderate persistent asthma     Prednisone taper ordered to hopefully prevent shortness of breath from worsening  Patient was advised to continue to use albuterol inhaler as needed  Relevant Medications    predniSONE 10 mg tablet       Other    Encounter by telehealth for suspected COVID-19 - Primary     9Feb:  COVID testing ordered  Prednisone taper ordered to help with congestion and intermittent shortness of breath  Patient was advised to continue to use albuterol inhaler as needed  I will follow-up with patient pending results of COVID test          Relevant Medications    predniSONE 10 mg tablet    Other Relevant Orders    COVID Only - Collected at [facility] or [facility]        [address]    Your healthcare provider and/or public health staff have evaluated you and have determined that you do not need to remain in the hospital at this time  At this time you can be isolated at home where you will be monitored by staff from your local or state health department  You should carefully follow the prevention and isolation steps below until a healthcare provider or local or state health department says that you can return to your normal activities  Stay home except to get medical care    People who are mildly ill with COVID-19 are able to isolate at home during their illness  You should restrict activities outside your home, except for getting medical care  Do not go to work, school, or public areas  Avoid using public transportation, ride-sharing, or taxis  Separate yourself from other people and animals in your home    People: As much as possible, you should stay in a specific room and away from other people in your home  Also, you should use a separate bathroom, if available  Animals: You should restrict contact with pets and other animals while you are sick with COVID-19, just like you would around other people  Although there have not been reports of pets or other animals becoming sick with COVID-19, it is still recommended that people sick with COVID-19 limit contact with animals until more information is known about the virus  When possible, have another member of your household care for your animals while you are sick  If you are sick with COVID-19, avoid contact with your pet, including petting, snuggling, being kissed or licked, and sharing food  If you must care for your pet or be around animals while you are sick, wash your hands before and after you interact with pets and wear a facemask  See COVID-19 and Animals for more information  Call ahead before visiting your doctor    If you have a medical appointment, call the healthcare provider and tell them that you have or may have COVID-19  This will help the healthcare providers office take steps to keep other people from getting infected or exposed  Wear a facemask    You should wear a facemask when you are around other people (e g , sharing a room or vehicle) or pets and before you enter a healthcare providers office  If you are not able to wear a facemask (for example, because it causes trouble breathing), then people who live with you should not stay in the same room with you, or they should wear a facemask if they enter your room  Cover your coughs and sneezes    Cover your mouth and nose with a tissue when you cough or sneeze  Throw used tissues in a lined trash can  Immediately wash your hands with soap and water for at least 20 seconds or, if soap and water are not available, clean your hands with an alcohol-based hand  that contains at least 60% alcohol  Clean your hands often    Wash your hands often with soap and water for at least 20 seconds, especially after blowing your nose, coughing, or sneezing; going to the bathroom; and before eating or preparing food   If soap and water are not readily available, use an alcohol-based hand  with at least 60% alcohol, covering all surfaces of your hands and rubbing them together until they feel dry  Soap and water are the best option if hands are visibly dirty  Avoid touching your eyes, nose, and mouth with unwashed hands  Avoid sharing personal household items    You should not share dishes, drinking glasses, cups, eating utensils, towels, or bedding with other people or pets in your home  After using these items, they should be washed thoroughly with soap and water  Clean all high-touch surfaces everyday    High touch surfaces include counters, tabletops, doorknobs, bathroom fixtures, toilets, phones, keyboards, tablets, and bedside tables  Also, clean any surfaces that may have blood, stool, or body fluids on them  Use a household cleaning spray or wipe, according to the label instructions  Labels contain instructions for safe and effective use of the cleaning product including precautions you should take when applying the product, such as wearing gloves and making sure you have good ventilation during use of the product  Monitor your symptoms    Seek prompt medical attention if your illness is worsening (e g , difficulty breathing)  Before seeking care, call your healthcare provider and tell them that you have, or are being evaluated for, COVID-19  Put on a facemask before you enter the facility  These steps will help the healthcare providers office to keep other people in the office or waiting room from getting infected or exposed  Ask your healthcare provider to call the local or [facility] health department  Persons who are placed under active monitoring or facilitated self-monitoring should follow instructions provided by their local health department or occupational health professionals, as appropriate  If you have a medical emergency and need to call 911, notify the dispatch personnel that you have, or are being evaluated for COVID-19   If possible, put on a facemask before emergency medical services arrive  Discontinuing home isolation    Patients with confirmed COVID-19 should remain under home isolation precautions until the following conditions are met:   - They have had no fever for at least 24 hours (that is one full day of no fever without the use medicine that reduces fevers)  AND  - other symptoms have improved (for example, when their cough or shortness of breath have improved)  AND  - If had mild or moderate illness, at least 10 days have passed since their symptoms first appeared or if severe illness (needed oxygen) or immunosuppressed, at least 20 days have passed since symptoms first appeared  Patients with confirmed COVID-19 should also notify close contacts (including their workplace) and ask that they self-quarantine  Currently, close contact is defined as being within 6 feet for 15 minutes or more from the period 24 hours starting 48 hours before symptom onset to the time at which the patient went into isolation  Close contacts of patients diagnosed with COVID-19 should be instructed by the patient to self-quarantine for 14 days from the last time of their last contact with the patient  Source: [employer]    COVID TESTING SITES    [facility] is offering drive through testing for COVID  These are the preferred testing sites  Wait times should be minimal     Beginning 1/24/2022:    [address]: M-F 3-7; LAST DAY: [id number]    The sites and times are subject to change  For the most Up to date locations and times, please visit this website:    [email or web address]      If you are not able to get to one of the drive through sites, you can go to [facility] if you have an order  There can be a long wait time for this service    When arriving at [facility]:  Call the number [facility], and they will instruct you as to what to do     Below is the link to [facility] locations  Walk-In Locations - Skip The Wait  [facility]  [address] ([facility] org)   Brian at      [facility]  [address], [address]  [place], [address] [facility]  [phone]  Mon  - Fri  7:30 am to 10:30 pm  Sat  - Sun  8 am to 8 pm    [address]  [address], [place] [address]  [phone]  Mon  - Fri  7:30 am to 10:30 pm  Sat  - Sun  8 am to 8 pm    [address] [facility] [place]  [address] [facility], [address]  [place], [address]  [address] [facility]  8 am to 8 pm  Sat  - Sun  8 am to 4 pm    [address]  [address] [facility], [address]  [phone]  Mon  - Fri  8 am to 4 pm  Sat  - Sun  8 am to 4 pm    [address] [facility], [address] [facility] [address]  [phone]  Mon  - Fri  8 am to 8 pm  Sat  - Sun  8 am to 4 pm    [address]  [address] [facility], [address]  [address] Fri  8 am to 8 pm  Sat  - Sun  8 am to 4 pm    [address] [facility], [address] [place] [address]  [phone]  Mon  - Fri  8 am to 4 pm    [address]  [address] [facility]  [place] [address], [address]  [phone]  Mon  - Fri  8 am to 8 pm  Sat  - Sun  8 am to 8 pm    [address]  [address] [facility], [address] [facility]  [phone]  Mon  - Fri  8 am to 8 pm    [facility]  [address] [place] [address], [address] [facility]  Mon -Fri  8 am to 8 pm  Sat - Sun  8 am to 4 pm    [address] [facility]  [address] [facility]  [place], [address]  [address] Fri  8 am to 8 pm  Sat  - Sun  8 am to 4 pm    [address]  [address], [address]  [phone]  Mon  - Fri  8 am to 4 pm    [address] [facility]  [address]  [place] [employer]    [facility], [address]  [phone]  Mon  - Fri  8 am to 8 pm  Sat  - Sun  8 am to 8 pm    [address]  [facility]  [address]  [place], [address]  [phone]  Mon  - Fri  8 am to 8 pm  Sat  - Sun  8 am to 8 pm    [address]   [address], [address], [address]  [phone]  Mon  - Fri  7:30 am to 10:30 pm  Sat  - Sun  8 am to 8 pm    [address]  [address], [place], [address]  Mon -Fri  8 am to 6 pm    [address] [facility]  [address], [address]  [phone]  Mon  - Fri  8 am to 8 pm    [address]  [address] [facility], [address]  [address]  [phone]  Mon  - Fri  8 am to 8 pm  Sat  - Sun  8 am to 4 pm

## 2022-02-09 NOTE — PROGRESS NOTES
COVID-19 Outpatient Progress Note    Assessment/Plan:    Problem List Items Addressed This Visit        Respiratory    Moderate persistent asthma     Prednisone taper ordered to hopefully prevent shortness of breath from worsening  Patient was advised to continue to use albuterol inhaler as needed  Relevant Medications    predniSONE 10 mg tablet       Other    Encounter by telehealth for suspected COVID-19 - Primary     9Feb:  COVID testing ordered  Prednisone taper ordered to help with congestion and intermittent shortness of breath  Patient was advised to continue to use albuterol inhaler as needed  I will follow-up with patient pending results of COVID test          Relevant Medications    predniSONE 10 mg tablet    Other Relevant Orders    COVID Only - Collected at   [facility] or [facility]         Disposition:     Referred patient to centralized site to test for COVID-19  I have spent 15 minutes directly with the patient  Encounter provider VERITO Dale    Provider located at [address] [address] [phone]    Recent Visits  No visits were found meeting these conditions  Showing recent visits within past 7 days and meeting all other requirements  Today's Visits  Date Type Provider Dept   02/09/22 Telemedicine [facility]   Showing today's visits and meeting all other requirements  Future Appointments  No visits were found meeting these conditions  Showing future appointments within next 150 days and meeting all other requirements     This virtual check-in was done via telephone and he agrees to proceed  Patient agrees to participate in a virtual check in via telephone or video visit instead of presenting to the office to address urgent/immediate medical needs  Patient is aware this is a billable service      After connecting through Telephone, the patient was identified by name and date of birth  Jun Vu was informed that this was a telemedicine visit and that the exam was being conducted confidentially over secure lines  My office door was closed  No one else was in the room  Jun Vu acknowledged consent and understanding of privacy and security of the telemedicine visit  I informed the patient that I have reviewed his record in Epic and presented the opportunity for him to ask any questions regarding the visit today  The patient agreed to participate  It was my intent to perform this visit via video technology but the patient was not able to do a video connection so the visit was completed via audio telephone only  Verification of patient location:  Patient is located in the following state in which I hold an active license: [place]    Subjective:   Jun Vu is a 42 y o  male who is concerned about COVID-19  Patient's symptoms include nasal congestion, rhinorrhea, sore throat, shortness of breath (intermittent) and chest tightness  Patient denies fever, chills, fatigue, malaise, anosmia, loss of taste, cough, abdominal pain, nausea, vomiting, diarrhea, myalgias and headaches       - Date of symptom onset: 2/8/2022      COVID-19 vaccination status: Fully vaccinated (primary series)    Exposure:   Contact with a person who is under investigation (PUI) for or who is positive for COVID-19 within the last 14 days?: No    Hospitalized recently for fever and/or lower respiratory symptoms?: No      Currently a healthcare worker that is involved in direct patient care?: No      Works in a special setting where the risk of COVID-19 transmission may be high? (this may include long-term care, correctional and shelter facilities; homeless shelters; assisted-living facilities and group homes ): No      Resident in a special setting where the risk of COVID-19 transmission may be high? (this may include long-term care, correctional and shelter facilities; homeless shelters; assisted-living facilities and group homes ): No      Patient is reporting symptoms of swollen glands in neck, right ear itching and burning, sinus pressure and congestion, PND, rhinorrhea, nasal congestion, and sore throat  Patient does have a history of asthma and does report that he did have 1 episode of shortness of breath yesterday for which he used his albuterol inhaler  He reports that the albuterol did relieve his symptoms and he has not had shortness of breath since that time or has needed to use the albuterol inhaler again  Patient currently denies any fevers  Patient is fully vaccinated against COVID and denies any known COVID exposures in the past 2 weeks  COVID testing ordered    Patient advised to continue to quarantine until he receives the results of his COVID test     Lab Results   Component Value Date    SARSCOV2 Not Detected 09/30/2020     Past Medical History:   Diagnosis Date    Acute appendicitis     Asthma     Hypertension     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    Proteinuria      Past Surgical History:   Procedure Laterality Date    APPENDECTOMY      IR BIOPSY KIDNEY MASS  2/21/2019    SHOULDER SURGERY      TRIGGER FINGER RELEASE       Current Outpatient Medications   Medication Sig Dispense Refill    albuterol (2 5 mg/3 mL) 0 083 % nebulizer solution Take 3 mL (2 5 mg total) by nebulization every 4 (four) hours as needed for wheezing or shortness of breath 100 mL 5    albuterol (PROVENTIL HFA,VENTOLIN HFA) 90 mcg/act inhaler Inhale 2 puffs every 4 (four) hours as needed for wheezing 1 Inhaler 5    cyclobenzaprine (FLEXERIL) 10 mg tablet Take 1 tablet (10 mg total) by mouth 3 (three) times a day as needed for muscle spasms (Patient taking differently: Take 10 mg by mouth 3 (three) times a day as needed for muscle spasms PRN ) 30 tablet 2    losartan (COZAAR) 50 mg tablet TAKE 1 TABLET BY MOUTH EVERY DAY 90 tablet 1    mometasone-formoterol (Dulera) 200-5 MCG/ACT inhaler Inhale 2 puffs 2 (two) times a day Rinse mouth after use  1 Inhaler 5    Multiple Vitamin (multivitamin) capsule Take 1 capsule by mouth daily      predniSONE 10 mg tablet Use 5 tablets for 2 days  Use 4 tablets for 2 days  Use 3 tablets for 2 days  Use 2 tablets for 2 days  Use 1 tablet for 2 days  30 tablet 0     No current facility-administered medications for this visit  Facility-Administered Medications Ordered in Other Visits   Medication Dose Route Frequency Provider Last Rate Last Admin    lactated ringers infusion  125 mL/hr Intravenous Continuous Elisha Esquivel MD   Continue from Pre-op at 02/08/22 1003    ondansetron (ZOFRAN) injection 4 mg  4 mg Intravenous Once PRN Elisha Esquivel MD         Allergies   Allergen Reactions    Molds & Smuts Shortness Of Breath and Rash    Other Shortness Of Breath    Shellfish Allergy - Food Allergy Hives    Shellfish-Derived Products - Food Allergy Hives       Review of Systems   Constitutional: Negative for chills, fatigue and fever  HENT: Positive for congestion, postnasal drip, rhinorrhea, sinus pressure, sinus pain and sore throat  Eyes: Negative  Respiratory: Positive for chest tightness and shortness of breath (intermittent)  Negative for cough  Cardiovascular: Negative  Gastrointestinal: Negative for abdominal pain, diarrhea, nausea and vomiting  Endocrine: Negative  Genitourinary: Negative  Musculoskeletal: Negative for myalgias  Skin: Negative  Allergic/Immunologic: Negative  Neurological: Negative for headaches  Hematological: Negative  Psychiatric/Behavioral: Negative  Objective:    Vitals:    02/09/22 0843   Temp: 98 6 °F (37 °C)   Weight: 65 8 kg (145 lb)   Height: 5' 3" (1 6 m)       Physical Exam  Vitals reviewed: limited due to phone only exam    Neurological:      Mental Status: He is alert           VIRTUAL VISIT DISCLAIMER    Toya Palacios verbally agrees to participate in [facility]  Pt is aware that [employer] could be limited without vital signs or the ability to perform a full hands-on physical exam  Reagan Mckenna understands he or the provider may request at any time to terminate the video visit and request the patient to seek care or treatment in person

## 2022-02-09 NOTE — ASSESSMENT & PLAN NOTE
9Feb:  COVID testing ordered  Prednisone taper ordered to help with congestion and intermittent shortness of breath  Patient was advised to continue to use albuterol inhaler as needed    I will follow-up with patient pending results of COVID test

## 2022-02-11 ENCOUNTER — TELEPHONE (OUTPATIENT)
Dept: GASTROENTEROLOGY | Facility: CLINIC | Age: 47
End: 2022-02-11

## 2022-02-11 NOTE — TELEPHONE ENCOUNTER
Patients GI provider:  NATANAEL Myers    Number to return call: [phone]    Reason for call: Pt calling to schedule repeat colonoscopy as he states he was not fully cleared out last procedure      Scheduled procedure/appointment date if applicable: Appt: 7/43/5424

## 2022-02-14 ENCOUNTER — DOCUMENTATION (OUTPATIENT)
Dept: GASTROENTEROLOGY | Facility: CLINIC | Age: 47
End: 2022-02-14

## 2022-02-14 NOTE — TELEPHONE ENCOUNTER
Patient will  sample/instructions at [facility] office  Patient is aware on 5/2/22 at 6pm to take two 5mg dulcolax tablets  Day before at 6am take 10 oz Magnesium Citrate  Day before at 6pm drink 1 bottle of clenpig and drink 5 glass of clear liquids within 5 hours  Day of procedure at 7am drink 1 bottle of clenpiq and drink 5 glass of clear liquids         Scheduled date of colonoscopy (as of today): 5/4/2022  Physician performing colonoscopy: Dr Palomino  Location of colonoscopy: [facility]  Bowel prep reviewed with patient: Clenpiq sample  Instructions reviewed with patient by: Heavenly Das  Clearances:N/A

## 2022-02-25 ENCOUNTER — TELEPHONE (OUTPATIENT)
Dept: NEPHROLOGY | Facility: CLINIC | Age: 47
End: 2022-02-25

## 2022-03-16 ENCOUNTER — OFFICE VISIT (OUTPATIENT)
Dept: FAMILY MEDICINE CLINIC | Facility: CLINIC | Age: 47
End: 2022-03-16
Payer: COMMERCIAL

## 2022-03-16 VITALS
WEIGHT: 150.6 LBS | HEIGHT: 62 IN | BODY MASS INDEX: 27.71 KG/M2 | DIASTOLIC BLOOD PRESSURE: 96 MMHG | SYSTOLIC BLOOD PRESSURE: 148 MMHG | HEART RATE: 80 BPM

## 2022-03-16 DIAGNOSIS — F41.9 ANXIETY: Primary | ICD-10-CM

## 2022-03-16 DIAGNOSIS — F51.01 PRIMARY INSOMNIA: ICD-10-CM

## 2022-03-16 DIAGNOSIS — Z00.00 WELL ADULT EXAM: ICD-10-CM

## 2022-03-16 PROBLEM — Z20.822 ENCOUNTER BY TELEHEALTH FOR SUSPECTED COVID-19: Status: RESOLVED | Noted: 2022-02-09 | Resolved: 2022-03-16

## 2022-03-16 PROBLEM — R19.7 DIARRHEA: Status: RESOLVED | Noted: 2021-11-26 | Resolved: 2022-03-16

## 2022-03-16 PROBLEM — R14.0 BLOATING: Status: RESOLVED | Noted: 2021-11-26 | Resolved: 2022-03-16

## 2022-03-16 PROCEDURE — 99396 PREV VISIT EST AGE 40-64: CPT | Performed by: FAMILY MEDICINE

## 2022-03-16 PROCEDURE — 1036F TOBACCO NON-USER: CPT | Performed by: FAMILY MEDICINE

## 2022-03-16 PROCEDURE — 3008F BODY MASS INDEX DOCD: CPT | Performed by: FAMILY MEDICINE

## 2022-03-16 RX ORDER — LORAZEPAM 0.5 MG/1
TABLET ORAL
Qty: 20 TABLET | Refills: 1 | Status: SHIPPED | OUTPATIENT
Start: 2022-03-16

## 2022-03-16 NOTE — PROGRESS NOTES
Assessment and Plan:    Problem List Items Addressed This Visit     None                 There are no diagnoses linked to this encounter  Subjective:      Patient ID: Dilcia Granados is a 42 y o  male  CC:    Chief Complaint   Patient presents with    Physical Exam     Pt here for yearly P E        HPI:    HPI    The following portions of the patient's history were reviewed and updated as appropriate: allergies, current medications, past family history, past medical history, past social history, past surgical history and problem list   Assessment/Plan     Healthy male exam      1  Here for physical-needs f/u colonoscopy due to inadequate prep, under stress due to dad's renal failure  2  Patient Counseling:  --Nutrition: Stressed importance of moderation in sodium/caffeine intake, saturated fat and cholesterol, caloric balance, sufficient intake of fresh fruits, vegetables, fiber,   --Exercise: Stressed the importance of regular exercise  --Substance Abuse: no concenrs    --Sexuality: no concerns   --Injury prevention: Discussed safety belts, safety helmets, smoke detector, smoking near bedding or upholstery  --Dental health: Discussed importance of regular tooth brushing, flossing, and dental visits  utd on eye and dental  --Immunizations reviewed  --Discussed benefits of screening colonoscopy  -needs repeat due to poor prep  --After hours service discussed with patient    3  Discussed the patient's BMI with him  The BMI is above average; BMI management plan is completed  4  Follow up as needed for acute illness  Subjective     Dilcia Granados is a 42 y o  male and is here for a comprehensive physical exam  The patient reports problems - some anxiety  Do you take any herbs or supplements that were not prescribed by a doctor? no  Are you taking calcium supplements? no  Are you taking aspirin daily?  no     History:  Patient receives prostate care outside our clinic  Date last prostate exam: none  Date last PSA: none    The following portions of the patient's history were reviewed and updated as appropriate: allergies, current medications, past family history, past medical history, past social history, past surgical history and problem list   no concerns    Review of Systems  Do you have pain that bothers you in your daily life? no  Behavioral/Psych: positive for anxiety and sleep disturbance  Objective     /96   Pulse 80   Ht 5' 2" (1 575 m)   Wt 68 3 kg (150 lb 9 6 oz)   BMI 27 55 kg/m²     General Appearance:    Alert, cooperative, no distress, appears stated age   Head:    Normocephalic, without obvious abnormality, atraumatic   Eyes:    PERRL, conjunctiva/corneas clear, EOM's intact, fundi     benign, both eyes        Ears:    Normal TM's and external ear canals, both ears   Nose:   Nares normal, septum midline, mucosa normal, no drainage    or sinus tenderness   Throat:   Lips, mucosa, and tongue normal; teeth and gums normal   Neck:   Supple, symmetrical, trachea midline, no adenopathy;        thyroid:  No enlargement/tenderness/nodules; no carotid    bruit or JVD   Back:     Symmetric, no curvature, ROM normal, no CVA tenderness   Lungs:     Clear to auscultation bilaterally, respirations unlabored   Chest wall:    No tenderness or deformity   Heart:    Regular rate and rhythm, S1 and S2 normal, no murmur, rub   or gallop   Abdomen:     Soft, non-tender, bowel sounds active all four quadrants,     no masses, no organomegaly   Genitalia:    Normal male without lesion, discharge or tenderness   Rectal:    Normal tone, normal prostate, no masses or tenderness;    guaiac negative stool   Extremities:   Extremities normal, atraumatic, no cyanosis or edema   Pulses:   2+ and symmetric all extremities   Skin:   Skin color, texture, turgor normal, no rashes or lesions   Lymph nodes:   Cervical, supraclavicular, and axillary nodes normal   Neurologic:   CNII-XII intact   Normal strength, sensation and reflexes       throughout     Review of Systems   Constitutional: Negative for activity change, appetite change and fatigue  Respiratory: Negative for cough  Cardiovascular: Negative for chest pain  Genitourinary: Negative for dysuria  Neurological: Negative for dizziness and headaches  Psychiatric/Behavioral: Positive for sleep disturbance  The patient is nervous/anxious  Data to review:       Objective:    Vitals:    03/16/22 0754   BP: 148/96   Pulse: 80   Weight: 68 3 kg (150 lb 9 6 oz)   Height: 5' 2" (1 575 m)        Physical Exam  Vitals reviewed  Constitutional:       Appearance: Normal appearance  HENT:      Right Ear: Tympanic membrane normal       Left Ear: Tympanic membrane normal       Nose: No congestion or rhinorrhea  Mouth/Throat:      Pharynx: No oropharyngeal exudate or posterior oropharyngeal erythema  Cardiovascular:      Rate and Rhythm: Normal rate and regular rhythm  Pulses: Normal pulses  Heart sounds: Normal heart sounds  Pulmonary:      Effort: Pulmonary effort is normal       Breath sounds: Normal breath sounds  Abdominal:      General: Abdomen is flat  Bowel sounds are normal       Palpations: Abdomen is soft  Lymphadenopathy:      Cervical: No cervical adenopathy  Neurological:      Mental Status: He is alert     Psychiatric:         Mood and Affect: Mood normal

## 2022-03-16 NOTE — PROGRESS NOTES
BMI Counseling: Body mass index is 27 55 kg/m²  The BMI is above normal  Nutrition recommendations include reducing portion sizes, decreasing overall calorie intake, 3-5 servings of fruits/vegetables daily, reducing fast food intake and consuming healthier snacks  Exercise recommendations include exercising 3-5 times per week

## 2022-03-20 DIAGNOSIS — N05.2 MEMBRANOUS GLOMERULONEPHRITIS: ICD-10-CM

## 2022-03-20 RX ORDER — LOSARTAN POTASSIUM 50 MG/1
TABLET ORAL
Qty: 90 TABLET | Refills: 1 | Status: SHIPPED | OUTPATIENT
Start: 2022-03-20 | End: 2022-04-21 | Stop reason: SDUPTHER

## 2022-04-13 DIAGNOSIS — J45.40 MODERATE PERSISTENT ASTHMA, UNSPECIFIED WHETHER COMPLICATED: ICD-10-CM

## 2022-04-13 RX ORDER — ALBUTEROL SULFATE 90 UG/1
AEROSOL, METERED RESPIRATORY (INHALATION)
Qty: 6.7 G | Refills: 5 | Status: SHIPPED | OUTPATIENT
Start: 2022-04-13

## 2022-04-13 NOTE — TELEPHONE ENCOUNTER
Requested Prescriptions     Pending Prescriptions Disp Refills    albuterol (PROVENTIL HFA,VENTOLIN HFA) 90 mcg/act inhaler [Pharmacy Med Name: ALBUTEROL HFA (PROVENTIL) INH]  5     Sig: TAKE 2 PUFFS BY MOUTH EVERY 4 HOURS AS NEEDED FOR WHEEZE     LOV 3/16/22, F/U 9/16/22, labs completed

## 2022-04-15 ENCOUNTER — APPOINTMENT (OUTPATIENT)
Dept: LAB | Facility: CLINIC | Age: 47
End: 2022-04-15
Payer: COMMERCIAL

## 2022-04-15 DIAGNOSIS — N05.2 MEMBRANOUS GLOMERULONEPHRITIS: ICD-10-CM

## 2022-04-15 LAB
ANION GAP SERPL CALCULATED.3IONS-SCNC: 2 MMOL/L (ref 4–13)
BUN SERPL-MCNC: 13 MG/DL (ref 5–25)
CALCIUM SERPL-MCNC: 9.1 MG/DL (ref 8.3–10.1)
CHLORIDE SERPL-SCNC: 108 MMOL/L (ref 100–108)
CO2 SERPL-SCNC: 30 MMOL/L (ref 21–32)
CREAT SERPL-MCNC: 0.98 MG/DL (ref 0.6–1.3)
CREAT UR-MCNC: 194 MG/DL
GFR SERPL CREATININE-BSD FRML MDRD: 93 ML/MIN/1.73SQ M
GLUCOSE P FAST SERPL-MCNC: 104 MG/DL (ref 65–99)
POTASSIUM SERPL-SCNC: 4.3 MMOL/L (ref 3.5–5.3)
PROT UR-MCNC: 414 MG/DL
PROT/CREAT UR: 2.13 MG/G{CREAT} (ref 0–0.1)
SODIUM SERPL-SCNC: 140 MMOL/L (ref 136–145)

## 2022-04-15 PROCEDURE — 83516 IMMUNOASSAY NONANTIBODY: CPT

## 2022-04-15 PROCEDURE — 82570 ASSAY OF URINE CREATININE: CPT

## 2022-04-15 PROCEDURE — 36415 COLL VENOUS BLD VENIPUNCTURE: CPT

## 2022-04-15 PROCEDURE — 84156 ASSAY OF PROTEIN URINE: CPT

## 2022-04-15 PROCEDURE — 80048 BASIC METABOLIC PNL TOTAL CA: CPT

## 2022-04-19 ENCOUNTER — TELEPHONE (OUTPATIENT)
Dept: NEPHROLOGY | Facility: CLINIC | Age: 47
End: 2022-04-19

## 2022-04-19 NOTE — TELEPHONE ENCOUNTER
Appointment Confirmation   Person confirmed appointment with  If not patient, name of the person Patient    Date and time of appointment 4/19    8:30    Patient acknowledged and will be at appointment? yes    Did you advise the patient that they will need a urine sample if they are a new patient?  N/A    Did you advise the patient to bring their current medications for verification? (including any OTC) Yes    Additional Information

## 2022-04-20 LAB — MISCELLANEOUS LAB TEST RESULT: NORMAL

## 2022-04-21 ENCOUNTER — OFFICE VISIT (OUTPATIENT)
Dept: NEPHROLOGY | Facility: CLINIC | Age: 47
End: 2022-04-21
Payer: COMMERCIAL

## 2022-04-21 VITALS
BODY MASS INDEX: 27.46 KG/M2 | SYSTOLIC BLOOD PRESSURE: 142 MMHG | HEIGHT: 62 IN | WEIGHT: 149.2 LBS | HEART RATE: 80 BPM | DIASTOLIC BLOOD PRESSURE: 88 MMHG

## 2022-04-21 DIAGNOSIS — N02.2 MEMBRANOUS NEPHROPATHY DETERMINED BY BIOPSY: Primary | ICD-10-CM

## 2022-04-21 DIAGNOSIS — N05.2 MEMBRANOUS GLOMERULONEPHRITIS: ICD-10-CM

## 2022-04-21 DIAGNOSIS — R80.8 OTHER PROTEINURIA: ICD-10-CM

## 2022-04-21 PROCEDURE — 3008F BODY MASS INDEX DOCD: CPT | Performed by: INTERNAL MEDICINE

## 2022-04-21 PROCEDURE — 99214 OFFICE O/P EST MOD 30 MIN: CPT | Performed by: INTERNAL MEDICINE

## 2022-04-21 PROCEDURE — 1036F TOBACCO NON-USER: CPT | Performed by: INTERNAL MEDICINE

## 2022-04-21 RX ORDER — LOSARTAN POTASSIUM 100 MG/1
100 TABLET ORAL DAILY
Qty: 90 TABLET | Refills: 3 | Status: SHIPPED | OUTPATIENT
Start: 2022-04-21

## 2022-04-21 NOTE — PROGRESS NOTES
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  His anti phospholipase to a receptor antibodies were borderline positive him protein estimation was around 2 g  He has no symptoms clinically with no swelling  Last albumin was normal and lipid profile was good as well although that was a few months ago  Creatinine is 0 9  I demonstrated to him that in the past when the antibody titers were negative the protein estimation is were low so it seems to correlate with him  For now his other a specific medication except for an ARB  For now were going to continue this conservative therapy is protein estimation is under 4 g which is good prognosis for membranous GN  I told to call if there is any significant swelling or other concerns  Increase losartan to 100 mg a day  Will check BMP in urine protein estimation in 3 months on higher dose    If things remain stable will check CMP here protein estimation and antibody titers before follow-up in 6 months  SUBJECTIVE:  Review of Systems   Constitutional: Negative for chills, decreased appetite, fever and malaise/fatigue  HENT: Negative  Eyes: Negative  Cardiovascular: Negative  Negative for chest pain, dyspnea on exertion, leg swelling and orthopnea  Respiratory: Negative  Negative for cough, shortness of breath, sputum production and wheezing      Gastrointestinal: Negative for abdominal pain, diarrhea, nausea and vomiting  Genitourinary: Negative for dysuria, flank pain, hematuria and incomplete emptying  Neurological: Negative for dizziness, focal weakness, headaches and weakness  Psychiatric/Behavioral: Negative for altered mental status, depression, hallucinations and hypervigilance  OBJECTIVE:  Current Weight: Weight - Scale: 67 7 kg (149 lb 3 2 oz)  Robert@[employer]:     Blood pressure 142/88, pulse 80, height 5' 2" (1 575 m), weight 67 7 kg (149 lb 3 2 oz)  , Body mass index is 27 29 kg/m²  @[facility]@    Physical Exam: /88 (BP Location: Left arm, Patient Position: Sitting, Cuff Size: Standard)   Pulse 80   Ht 5' 2" (1 575 m)   Wt 67 7 kg (149 lb 3 2 oz)   BMI 27 29 kg/m²   Physical Exam  Constitutional:       General: He is not in acute distress  Appearance: He is not ill-appearing, toxic-appearing or diaphoretic  HENT:      Head: Normocephalic and atraumatic  Nose: Nose normal       Mouth/Throat:      Mouth: Mucous membranes are moist    Eyes:      General: No scleral icterus  Extraocular Movements: Extraocular movements intact  Cardiovascular:      Rate and Rhythm: Normal rate and regular rhythm  Heart sounds: No friction rub  No gallop  Comments: No edema  Pulmonary:      Effort: Pulmonary effort is normal  No respiratory distress  Breath sounds: Normal breath sounds  No wheezing, rhonchi or rales  Abdominal:      General: Bowel sounds are normal  There is no distension  Palpations: Abdomen is soft  Tenderness: There is no abdominal tenderness  There is no rebound  Musculoskeletal:      Cervical back: Normal range of motion and neck supple  Neurological:      General: No focal deficit present  Mental Status: He is alert and oriented to person, place, and time  Mental status is at baseline  Psychiatric:         Mood and Affect: Mood normal          Behavior: Behavior normal          Thought Content:  Thought content normal  Judgment: Judgment normal          Medications:    Current Outpatient Medications:     albuterol (2 5 mg/3 mL) 0 083 % nebulizer solution, Take 3 mL (2 5 mg total) by nebulization every 4 (four) hours as needed for wheezing or shortness of breath, Disp: 100 mL, Rfl: 5    albuterol (PROVENTIL HFA,VENTOLIN HFA) 90 mcg/act inhaler, TAKE 2 PUFFS BY MOUTH EVERY 4 HOURS AS NEEDED FOR WHEEZE, Disp: 6 7 g, Rfl: 5    LORazepam (Ativan) 0 5 mg tablet, 1/2-1 qhs prn, Disp: 20 tablet, Rfl: 1    losartan (COZAAR) 100 MG tablet, Take 1 tablet (100 mg total) by mouth daily, Disp: 90 tablet, Rfl: 3    mometasone-formoterol (Dulera) 200-5 MCG/ACT inhaler, Inhale 2 puffs 2 (two) times a day Rinse mouth after use , Disp: 1 Inhaler, Rfl: 5    Multiple Vitamin (multivitamin) capsule, Take 1 capsule by mouth daily, Disp: , Rfl:     Laboratory Results:  Lab Results   Component Value Date    WBC 5 02 12/14/2021    HGB 14 8 12/14/2021    HCT 45 9 12/14/2021    MCV 89 12/14/2021     12/14/2021     Lab Results   Component Value Date    SODIUM 140 04/15/2022    K 4 3 04/15/2022     04/15/2022    CO2 30 04/15/2022    BUN 13 04/15/2022    CREATININE 0 98 04/15/2022    GLUC 85 03/25/2021    CALCIUM 9 1 04/15/2022     Lab Results   Component Value Date    CALCIUM 9 1 04/15/2022     No results found for: LABPROT

## 2022-04-21 NOTE — PATIENT INSTRUCTIONS
You are here for follow-up you look great physically you have no swelling  Blood pressure was slightly elevated in the office but you stated usually better than this at home when you monitor it  With respect your kidney function your creatinine is still normal at 0 9 the amount of protein in the urine is around 2 grams which is little higher but your off all medications specifically to try lower that and your on cyclosporin in the past   We discussed that you have chronic proteinuria due to this but your kidney function is still normal you have no other symptoms and if it is under 4 grams it is a good prognosis in membranous GN  At this point I do not feel like adding more immunosuppression  Were going to try and increase her losartan to 100 milligrams a day to see if you tolerate that and also if it reduces the protein in the urine  Please obtain lab work in 3 months after increase losartan to 100 milligrams a day  Call me if there is any side effects or problems with the increased dose    I will contact you with those results and then we will follow-up in 6 months with repeat labs unless there is in need to be seen sooner

## 2022-04-21 NOTE — LETTER
April 21, 2022     [address]  [address]  [id number]    Patient: Mabel Rae   YOB: 1975   Date of Visit: 4/21/2022       Dear Dr Laina Hubbard:    Thank you for referring Mauro Ryan to me for evaluation  Below are my notes for this consultation  If you have questions, please do not hesitate to call me  I look forward to following your patient along with you  Sincerely,        Cayden Patel MD        CC: No Recipients  Cayden Patel MD  4/21/2022  9:05 AM  Sign when Signing Visit  NEPHROLOGY PROGRESS NOTE    Mabel Rae 42 y o  male MRN: [id number]  Unit/Bed#:  Encounter: [id number]  Reason for Consult:  Membranous GN with proteinuria    The patient is here for routine follow-up he has been well states he is doing life has had no intercurrent illnesses or changes in medications  He is no longer on cyclosporin as this was discontinued  He is enjoying work and has no acute complaints no swelling no urinary complaints  We reviewed his medications  ASSESSMENT/PLAN:  1  Renal    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  His anti phospholipase to a receptor antibodies were borderline positive him protein estimation was around 2 g  He has no symptoms clinically with no swelling  Last albumin was normal and lipid profile was good as well although that was a few months ago  Creatinine is 0 9  I demonstrated to him that in the past when the antibody titers were negative the protein estimation is were low so it seems to correlate with him  For now his other a specific medication except for an ARB  For now were going to continue this conservative therapy is protein estimation is under 4 g which is good prognosis for membranous GN  I told to call if there is any significant swelling or other concerns      Increase losartan to 100 mg a day  Will check BMP in urine protein estimation in 3 months on higher dose    If things remain stable will check CMP here protein estimation and antibody titers before follow-up in 6 months  SUBJECTIVE:  Review of Systems   Constitutional: Negative for chills, decreased appetite, fever and malaise/fatigue  HENT: Negative  Eyes: Negative  Cardiovascular: Negative  Negative for chest pain, dyspnea on exertion, leg swelling and orthopnea  Respiratory: Negative  Negative for cough, shortness of breath, sputum production and wheezing  Gastrointestinal: Negative for abdominal pain, diarrhea, nausea and vomiting  Genitourinary: Negative for dysuria, flank pain, hematuria and incomplete emptying  Neurological: Negative for dizziness, focal weakness, headaches and weakness  Psychiatric/Behavioral: Negative for altered mental status, depression, hallucinations and hypervigilance  OBJECTIVE:  Current Weight: Weight - Scale: 67 7 kg (149 lb 3 2 oz)  [email or web address]     Blood pressure 142/88, pulse 80, height 5' 2" (1 575 m), weight 67 7 kg (149 lb 3 2 oz)  , Body mass index is 27 29 kg/m²  [unfilled]    Physical Exam: /88 (BP Location: Left arm, Patient Position: Sitting, Cuff Size: Standard)   Pulse 80   Ht 5' 2" (1 575 m)   Wt 67 7 kg (149 lb 3 2 oz)   BMI 27 29 kg/m²   Physical Exam  Constitutional:       General: He is not in acute distress  Appearance: He is not ill-appearing, toxic-appearing or diaphoretic  HENT:      Head: Normocephalic and atraumatic  Nose: Nose normal       Mouth/Throat:      Mouth: Mucous membranes are moist    Eyes:      General: No scleral icterus  Extraocular Movements: Extraocular movements intact  Cardiovascular:      Rate and Rhythm: Normal rate and regular rhythm  Heart sounds: No friction rub  No gallop  Comments: No edema  Pulmonary:      Effort: Pulmonary effort is normal  No respiratory distress  Breath sounds: Normal breath sounds  No wheezing, rhonchi or rales     Abdominal:      General: Bowel sounds are normal  There is no distension  Palpations: Abdomen is soft  Tenderness: There is no abdominal tenderness  There is no rebound  Musculoskeletal:      Cervical back: Normal range of motion and neck supple  Neurological:      General: No focal deficit present  Mental Status: He is alert and oriented to person, place, and time  Mental status is at baseline  Psychiatric:         Mood and Affect: Mood normal          Behavior: Behavior normal          Thought Content:  Thought content normal          Judgment: Judgment normal          Medications:    Current Outpatient Medications:     albuterol (2 5 mg/3 mL) 0 083 % nebulizer solution, Take 3 mL (2 5 mg total) by nebulization every 4 (four) hours as needed for wheezing or shortness of breath, Disp: 100 mL, Rfl: 5    albuterol (PROVENTIL HFA,VENTOLIN HFA) 90 mcg/act inhaler, TAKE 2 PUFFS BY MOUTH EVERY 4 HOURS AS NEEDED FOR WHEEZE, Disp: 6 7 g, Rfl: 5    LORazepam (Ativan) 0 5 mg tablet, 1/2-1 qhs prn, Disp: 20 tablet, Rfl: 1    losartan (COZAAR) 100 MG tablet, Take 1 tablet (100 mg total) by mouth daily, Disp: 90 tablet, Rfl: 3    mometasone-formoterol (Dulera) 200-5 MCG/ACT inhaler, Inhale 2 puffs 2 (two) times a day Rinse mouth after use , Disp: 1 Inhaler, Rfl: 5    Multiple Vitamin (multivitamin) capsule, Take 1 capsule by mouth daily, Disp: , Rfl:     Laboratory Results:  Lab Results   Component Value Date    WBC 5 02 12/14/2021    HGB 14 8 12/14/2021    HCT 45 9 12/14/2021    MCV 89 12/14/2021     12/14/2021     Lab Results   Component Value Date    SODIUM 140 04/15/2022    K 4 3 04/15/2022     04/15/2022    CO2 30 04/15/2022    BUN 13 04/15/2022    CREATININE 0 98 04/15/2022    GLUC 85 03/25/2021    CALCIUM 9 1 04/15/2022     Lab Results   Component Value Date    CALCIUM 9 1 04/15/2022     No results found for: LABPROT

## 2022-05-04 ENCOUNTER — ANESTHESIA (OUTPATIENT)
Dept: GASTROENTEROLOGY | Facility: AMBULARY SURGERY CENTER | Age: 47
End: 2022-05-04

## 2022-05-04 ENCOUNTER — HOSPITAL ENCOUNTER (OUTPATIENT)
Dept: GASTROENTEROLOGY | Facility: AMBULARY SURGERY CENTER | Age: 47
Setting detail: OUTPATIENT SURGERY
Discharge: HOME | End: 2022-05-04
Attending: INTERNAL MEDICINE | Admitting: INTERNAL MEDICINE
Payer: COMMERCIAL

## 2022-05-04 ENCOUNTER — ANESTHESIA EVENT (OUTPATIENT)
Dept: GASTROENTEROLOGY | Facility: AMBULARY SURGERY CENTER | Age: 47
End: 2022-05-04

## 2022-05-04 VITALS
HEART RATE: 79 BPM | WEIGHT: 149 LBS | BODY MASS INDEX: 27.42 KG/M2 | DIASTOLIC BLOOD PRESSURE: 85 MMHG | TEMPERATURE: 97 F | RESPIRATION RATE: 16 BRPM | HEIGHT: 62 IN | SYSTOLIC BLOOD PRESSURE: 161 MMHG | OXYGEN SATURATION: 100 %

## 2022-05-04 DIAGNOSIS — R19.7 DIARRHEA, UNSPECIFIED TYPE: ICD-10-CM

## 2022-05-04 PROCEDURE — 88305 TISSUE EXAM BY PATHOLOGIST: CPT | Performed by: PATHOLOGY

## 2022-05-04 PROCEDURE — 45385 COLONOSCOPY W/LESION REMOVAL: CPT | Performed by: INTERNAL MEDICINE

## 2022-05-04 RX ORDER — SODIUM CHLORIDE, SODIUM LACTATE, POTASSIUM CHLORIDE, CALCIUM CHLORIDE 600; 310; 30; 20 MG/100ML; MG/100ML; MG/100ML; MG/100ML
INJECTION, SOLUTION INTRAVENOUS CONTINUOUS PRN
Status: DISCONTINUED | OUTPATIENT
Start: 2022-05-04 | End: 2022-05-04

## 2022-05-04 RX ORDER — PROPOFOL 10 MG/ML
INJECTION, EMULSION INTRAVENOUS AS NEEDED
Status: DISCONTINUED | OUTPATIENT
Start: 2022-05-04 | End: 2022-05-04

## 2022-05-04 RX ADMIN — PROPOFOL 50 MG: 10 INJECTION, EMULSION INTRAVENOUS at 14:06

## 2022-05-04 RX ADMIN — PROPOFOL 100 MG: 10 INJECTION, EMULSION INTRAVENOUS at 14:02

## 2022-05-04 RX ADMIN — PROPOFOL 50 MG: 10 INJECTION, EMULSION INTRAVENOUS at 14:09

## 2022-05-04 RX ADMIN — PROPOFOL 50 MG: 10 INJECTION, EMULSION INTRAVENOUS at 14:14

## 2022-05-04 RX ADMIN — PROPOFOL 50 MG: 10 INJECTION, EMULSION INTRAVENOUS at 14:08

## 2022-05-04 RX ADMIN — PROPOFOL 50 MG: 10 INJECTION, EMULSION INTRAVENOUS at 14:18

## 2022-05-04 RX ADMIN — SODIUM CHLORIDE, SODIUM LACTATE, POTASSIUM CHLORIDE, AND CALCIUM CHLORIDE: .6; .31; .03; .02 INJECTION, SOLUTION INTRAVENOUS at 13:57

## 2022-05-04 NOTE — ANESTHESIA POSTPROCEDURE EVALUATION
Post-Op Assessment Note    CV Status:  Stable  Pain Score: 0    Pain management: adequate     Mental Status:  Sleepy   Hydration Status:  Euvolemic   PONV Controlled:  Controlled   Airway Patency:  Patent      Post Op Vitals Reviewed: Yes      Staff: Anesthesiologist, CRNA         No complications documented      /60 (05/04/22 1429)    Temp (!) 97 °F (36 1 °C) (05/04/22 1429)    Pulse 100 (05/04/22 1429)   Resp 14 (05/04/22 1429)    SpO2 99 % (05/04/22 1429)

## 2022-05-04 NOTE — H&P
History and Physical -  [facility]  Kaelyn Morrissey 42 y o  male MRN: [id number]                HPI: Kaelyn Morrissey is a 42y o  year old male who presents for   Colonoscopy due to change in bowel habits  REVIEW OF SYSTEMS: Per the HPI, and otherwise unremarkable      Historical Information   Past Medical History:   Diagnosis Date    Acute appendicitis     Asthma     Hypertension     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    Proteinuria      Past Surgical History:   Procedure Laterality Date    APPENDECTOMY      IR BIOPSY KIDNEY MASS  2/21/2019    SHOULDER SURGERY      TRIGGER FINGER RELEASE       Social History   Social History     Substance and Sexual Activity   Alcohol Use Yes    Comment: occasional     Social History     Substance and Sexual Activity   Drug Use No     Social History     Tobacco Use   Smoking Status Never Smoker   Smokeless Tobacco Never Used     Family History   Problem Relation Age of Onset    No Known Problems Mother     Diabetes Father     Kidney failure Father     Diabetes Family     No Known Problems Sister     Ulcers Brother        Meds/Allergies       Current Outpatient Medications:     albuterol (PROVENTIL HFA,VENTOLIN HFA) 90 mcg/act inhaler    losartan (COZAAR) 100 MG tablet    mometasone-formoterol (Dulera) 200-5 MCG/ACT inhaler    Multiple Vitamin (multivitamin) capsule    albuterol (2 5 mg/3 mL) 0 083 % nebulizer solution    LORazepam (Ativan) 0 5 mg tablet    Allergies   Allergen Reactions    Molds & Smuts Shortness Of Breath and Rash    Other Shortness Of Breath    Shellfish Allergy - Food Allergy Hives    Shellfish-Derived Products - Food Allergy Hives       Objective     BP (S) (!) 194/99 Comment: 205/  Pulse 74   Temp (!) 96 8 °F (36 °C) (Temporal)   Resp 21   Ht 5' 2" (1 575 m)   Wt 67 6 kg (149 lb)   SpO2 100%   BMI 27 25 kg/m²       PHYSICAL EXAM    Gen: NAD  Head: NCAT  CV: RRR  CHEST: Clear  ABD: soft, NT/ND  EXT: no edema      ASSESSMENT/PLAN:  This is a 42y o  year old male here for colonoscopy, and he is stable and optimized for his procedure

## 2022-05-04 NOTE — ANESTHESIA PREPROCEDURE EVALUATION
Procedure:  COLONOSCOPY    Relevant Problems   CARDIO   (+) Essential hypertension      /RENAL   (+) Membranous nephropathy determined by biopsy      MUSCULOSKELETAL   (+) Chronic bilateral low back pain without sciatica      NEURO/PSYCH   (+) Chronic bilateral low back pain without sciatica      PULMONARY   (+) Moderate persistent asthma      Adequately NPO  Good functional capacity  No prior anesthesia complications  Physical Exam    Airway  Comment: Small mouth  Mallampati score: III  TM Distance: >3 FB  Neck ROM: full     Dental   No notable dental hx     Cardiovascular  Rhythm: regular, Rate: normal,     Pulmonary  Pulmonary exam normal     Other Findings        Anesthesia Plan  ASA Score- 2     Anesthesia Type- IV sedation with anesthesia with ASA Monitors  Additional Monitors:   Airway Plan:     Comment: Spontaneous with supplemental O2  Plan Factors-Exercise tolerance (METS): >4 METS  Chart reviewed  Existing labs reviewed  Patient summary reviewed  Patient is not a current smoker  Obstructive sleep apnea risk education given perioperatively  Induction- intravenous  Postoperative Plan-     Informed Consent- Anesthetic plan and risks discussed with patient  I personally reviewed this patient with the CRNA  Discussed and agreed on the Anesthesia Plan with the CRNA  Ni Brandt

## 2022-05-04 NOTE — DISCHARGE INSTRUCTIONS
Colonoscopy   WHAT YOU NEED TO KNOW:   A colonoscopy is a procedure to examine the inside of your colon (intestine) with a scope  Polyps or tissue growths may have been removed during your colonoscopy  It is normal to feel bloated and to have some abdominal discomfort  You should be passing gas  If you have hemorrhoids or you had polyps removed, you may have a small amount of bleeding  DISCHARGE INSTRUCTIONS:   Seek care immediately if:    You have sudden, severe abdominal pain   You have problems swallowing   You have a large amount of black, sticky bowel movements or blood in your bowel movements   You have sudden trouble breathing   You feel weak, lightheaded, or faint or your heart beats faster than normal for you  Contact your healthcare provider if:    You have a fever and chills   You have nausea or are vomiting   Your abdomen is bloated or feels full and hard   You have abdominal pain   You have black, sticky bowel movements or blood in your bowel movements   You have not had a bowel movement for 3 days after your procedure   You have rash or hives   You have questions or concerns about your procedure  Activity:    Do not lift, strain, or run for 24 hours after your procedure   Rest after your procedure  You have been given medicine to relax you  Do not drive or make important decisions until the day after your procedure  Return to your normal activity as directed   Relieve gas and discomfort from bloating by lying on your right side with a heating pad on your abdomen  You may need to take short walks to help the gas move out  Eat small meals until bloating is relieved  Follow up with your healthcare provider as directed: Write down your questions so you remember to ask them during your visits  If you take a blood thinner, please review the specific instructions from your endoscopist about when you should resume it   These can be found in the Recommendation and Your Medication list sections of this After Visit Summary  Colorectal Polyps   WHAT YOU NEED TO KNOW:   What are colorectal polyps? Colorectal polyps are small growths of tissue in the lining of the colon and rectum  Most polyps are usually benign (not cancer)  Certain types of polyps, called adenomatous polyps, may turn into cancer  What increases my risk for colorectal polyps? The exact cause of colorectal polyps is unknown  The following may increase your risk:  · Older age    · Foods high in fat and low in fiber    · Family history of polyps    · Intestinal diseases, such as Crohn disease or ulcerative colitis    · Smoking cigarettes or drinking alcohol    · Lack of physical activity, such as exercise    · Obesity    What are the signs and symptoms of colorectal polyps? · Blood in your bowel movement or bleeding from the rectum    · Change in bowel movement habits, such as diarrhea or constipation    · Abdominal pain    What do I need to know about colorectal polyp screening and diagnosis? Screening means you are checked for polyps that may be cancer, even if you do not have signs or symptoms  Screening is recommended starting at age 48 and continuing to age 76 if you are at average risk  Your healthcare provider may suggest screening starting at age 39  Screening may start before you are 45 or continue after you are 75 if your risk is high  Your provider will tell you how often to get screened  Timing depends on the type of screening and if polyps are found  Timing also depends on your age and if you are at increased risk for cancer  Screening may be recommended every 1, 2, 5, or 10 years  Your healthcare provider will need to test polyps to find out if they are cancer  Any of the following may be used to find polyps:  · A digital rectal exam  means your provider will use a finger to check for polyps  · A barium enema  is an x-ray of the colon   A tube is put into your anus, and a liquid called barium is put through the tube  Barium is used so that healthcare providers can see your colon better on the x-ray film  · A virtual colonoscopy  is a CT scan that takes pictures of the inside of your colon and rectum  A small, flexible tube is put into your rectum and air or carbon dioxide (gas) is used to expand your colon  This lets healthcare providers clearly see your colon and any polyps on a monitor  · Colonoscopy or sigmoidoscopy  are procedures to help your provider see the inside of your colon  He or she will use a flexible tube with a small light and camera on the end  During a sigmoidoscopy, your provider will only look at rectum and lower colon  During a colonoscopy, he or she will look at the full length of your colon  A small amount of tissue may be removed from your colon for tests  How are colorectal polyps treated? Small, benign polyps may not need treatment  Your healthcare provider will check the polyp over time to make sure it is not changing  Polyps that are cancer may be removed with one of the following:  · A polypectomy  is a minimally invasive procedure to remove a polyp during a colonoscopy or sigmoidoscopy  Your healthcare provider may need to remove the polyp with a laparoscope  Laparoscopy is done by inserting a small, flexible scope into incisions made on your abdomen  · Surgery  may be needed to remove large or deep polyps that cannot be removed in a polypectomy  Tissues or lymph nodes near a polyp may also be removed  This helps stop cancer from spreading  What can I do to lower my risk for colorectal polyps? · Eat a variety of healthy foods  Healthy foods include fruit, vegetables, whole-grain breads, low-fat dairy products, beans, lean meat, and fish  Ask if you need to be on a special diet  · Maintain a healthy weight  Ask your healthcare provider what a healthy weight is for you   Ask him or her to help with a weight loss plan if you are overweight  · Exercise as directed  Begin to exercise slowly and do more as you get stronger  Talk with your healthcare provider before you start an exercise program          · Limit alcohol  Your risk for polyps increases the more you drink  · Do not smoke  Nicotine and other chemicals in cigarettes and cigars increase your risk for polyps  Ask your healthcare provider for information if you currently smoke and need help to quit  E-cigarettes or smokeless tobacco still contain nicotine  Talk to your healthcare provider before you use these products  Where can I find more information? · April Taylor ([facility])  [address], [place] [address]  Phone: [phone]  Web Address: Annika Major  [facility] gov    Call your local emergency number (911 in the [address]) if:   · You have sudden shortness of breath  · You have a fast heart rate, fast breathing, or are too dizzy to stand up  When should I seek immediate care? · You have severe abdominal pain  · You see blood in your bowel movement  When should I call my doctor? · You have a fever  · You have chills, a cough, or feel weak and achy  · You have abdominal pain that does not go away or gets worse after you take medicine  · Your abdomen is swollen  · You are losing weight without trying  · You have questions or concerns about your condition or care  CARE AGREEMENT:   You have the right to help plan your care  Learn about your health condition and how it may be treated  Discuss treatment options with your healthcare providers to decide what care you want to receive  You always have the right to refuse treatment  The above information is an  only  It is not intended as medical advice for individual conditions or treatments   Talk to your doctor, nurse or pharmacist before following any medical regimen to see if it is safe and effective for you  © Copyright [employer] 2022 Information is for End User's use only and may not be sold, redistributed or otherwise used for commercial purposes   All illustrations and images included in CareNotes® are the copyrighted property of A [employer] A M [employer]  or Jarek Daniels

## 2022-05-13 ENCOUNTER — OFFICE VISIT (OUTPATIENT)
Dept: GASTROENTEROLOGY | Facility: CLINIC | Age: 47
End: 2022-05-13
Payer: COMMERCIAL

## 2022-05-13 VITALS
HEIGHT: 62 IN | WEIGHT: 147.8 LBS | BODY MASS INDEX: 27.2 KG/M2 | TEMPERATURE: 98 F | SYSTOLIC BLOOD PRESSURE: 116 MMHG | DIASTOLIC BLOOD PRESSURE: 78 MMHG

## 2022-05-13 DIAGNOSIS — K63.5 POLYP OF DESCENDING COLON, UNSPECIFIED TYPE: ICD-10-CM

## 2022-05-13 DIAGNOSIS — K57.90 DIVERTICULOSIS: Primary | ICD-10-CM

## 2022-05-13 DIAGNOSIS — K76.0 HEPATIC STEATOSIS: ICD-10-CM

## 2022-05-13 PROCEDURE — 3008F BODY MASS INDEX DOCD: CPT | Performed by: PHYSICIAN ASSISTANT

## 2022-05-13 PROCEDURE — 1036F TOBACCO NON-USER: CPT | Performed by: PHYSICIAN ASSISTANT

## 2022-05-13 PROCEDURE — 99214 OFFICE O/P EST MOD 30 MIN: CPT | Performed by: PHYSICIAN ASSISTANT

## 2022-05-13 NOTE — LETTER
May 13, 2022     Americo Goetz MD  [address] [facility]  [phone]  [phone]    Patient: Jamal Livingston   YOB: 1975   Date of Visit: 5/13/2022       Dear Dr Alhaji Valles:    Thank you for referring Charis Dior to me for evaluation  Below are my notes for this consultation  If you have questions, please do not hesitate to call me  I look forward to following your patient along with you  Sincerely,        Yanna Bo PA-C        CC: No Recipients  Yanna Bo PA-C  5/13/2022 11:23 AM  Sign when Signing Visit  [facility] - Outpatient Follow-up Note  Jamal Livingston 42 y o  male MRN: [id number]  Encounter: [id number]          ASSESSMENT AND PLAN:      1  Diverticulosis  Discussed importance of high fiber diet and drinking plenty of water daily  2  Polyp of descending colon, unspecified type  1 small sessile polyp was removed during colonoscopy next week  Biopsies are pending  We will contact him with results, however, preliminary recommendation is repeat colonoscopy in 7 years  3  Hepatic steatosis  Mild hepatic steatosis was found on recent abdominal ultrasound  NALFD fibrosis score -1 5 indicating low likelihood for significant fibrosis  We discussed goal weight loss of 10-15 lbs to improve the fatty liver  Encouraged him to avoid saturated fats and increase intake of vegetables, fruits  It is also important to limit alcohol intake  I explained that fatty liver can progress to inflammation and scarring of the liver  He can follow-up with PCP to check liver enzymes on a yearly basis  Follow-up as needed    ______________________________________________________________________    SUBJECTIVE:   82-year-old male with asthma, hypertension, membranous nephropathy presenting for follow-up of EGD and colonoscopy  I saw him in December 2021 for evaluation of diarrhea, abdominal pain, nausea, and bloating  he had EGD and colonoscopy in Feb 2022   EGD showed non-obstructing Schatzki's ring and small hiatal hernia otherwise normal endoscopy  Biopsies negative for H pylori and celiac disease  Colonoscopy showed poor prep, so we asked him to repeat with 2 day prep  Repeat colonoscopy in May showed 1 small polyp in the descending colon, sigmoid diverticulosis, and internal hemorrhoids  Biopsies are still pending  He is feeling much better overall  He has increased his water and fiber intake and is now having regular formed bowel movements  His other symptoms have significantly improved with simple dietary changes  Answers for HPI/ROS submitted by the patient on 5/12/2022  Chronicity: recurrent  Onset: more than 1 month ago  Onset quality: gradual  Frequency: intermittently  Episode duration: 1 hours  Progression since onset: gradually improving  Pain location: LLQ, RUQ  Pain - numeric: 2/10  Pain quality: aching  Radiates to: LLQ  anorexia: No  arthralgias: No  belching: No  constipation: No  diarrhea: No  dysuria: No  fever: No  flatus: Yes  frequency: No  headaches: No  hematochezia: No  hematuria: No  melena: No  myalgias: No  nausea: No  weight loss: No  vomiting: No  Aggravated by: movement  Relieved by: activity, belching, palpation  Diagnostic workup: lower endoscopy, ultrasound, upper endoscopy        REVIEW OF SYSTEMS IS OTHERWISE NEGATIVE        Historical Information   Past Medical History:   Diagnosis Date    Acute appendicitis     Asthma     Hypertension     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    Proteinuria      Past Surgical History:   Procedure Laterality Date    APPENDECTOMY      IR BIOPSY KIDNEY MASS  2/21/2019    SHOULDER SURGERY      TRIGGER FINGER RELEASE       Social History   Social History     Substance and Sexual Activity   Alcohol Use Yes    Comment: occasional     Social History     Substance and Sexual Activity   Drug Use No     Social History     Tobacco Use   Smoking Status Never Smoker   Smokeless Tobacco Never Used     Family History   Problem Relation Age of Onset    No Known Problems Mother     Diabetes Father     Kidney failure Father     Diabetes Family     No Known Problems Sister     Ulcers Brother        Meds/Allergies       Current Outpatient Medications:     albuterol (2 5 mg/3 mL) 0 083 % nebulizer solution    albuterol (PROVENTIL HFA,VENTOLIN HFA) 90 mcg/act inhaler    LORazepam (Ativan) 0 5 mg tablet    losartan (COZAAR) 100 MG tablet    mometasone-formoterol (Dulera) 200-5 MCG/ACT inhaler    Multiple Vitamin (multivitamin) capsule    Allergies   Allergen Reactions    Molds & Smuts Shortness Of Breath and Rash    Other Shortness Of Breath    Shellfish Allergy - Food Allergy Hives    Shellfish-Derived Products - Food Allergy Hives           Objective     Blood pressure 116/78, temperature 98 °F (36 7 °C), temperature source Tympanic, height 5' 2" (1 575 m), weight 67 kg (147 lb 12 8 oz)  Body mass index is 27 03 kg/m²  PHYSICAL EXAM:      General Appearance:   Alert, cooperative, no distress   HEENT:   Normocephalic, atraumatic, anicteric      Neck:  Supple, symmetrical, trachea midline   Lungs:   Clear to auscultation bilaterally; no rales, rhonchi or wheezing; respirations unlabored    Heart[de-identified]   Regular rate and rhythm; no murmur, rub, or gallop  Abdomen:   Soft, non-tender, non-distended; normal bowel sounds; no masses, no organomegaly    Genitalia:   Deferred    Rectal:   Deferred    Extremities:  No cyanosis, clubbing or edema    Pulses:  2+ and symmetric    Skin:  No jaundice, rashes, or lesions    Lymph nodes:  No palpable cervical lymphadenopathy        Lab Results:   No visits with results within 1 Day(s) from this visit     Latest known visit with results is:   Appointment on 04/15/2022   Component Date Value    Sodium 04/15/2022 140     Potassium 04/15/2022 4 3     Chloride 04/15/2022 108     CO2 04/15/2022 30     ANION GAP 04/15/2022 2 (A)    BUN 04/15/2022 13     Creatinine 04/15/2022 0 98     Glucose, Fasting 04/15/2022 104 (A)    Calcium 04/15/2022 9 1     eGFR 04/15/2022 93     Creatinine, Ur 04/15/2022 194 0     Protein Urine Random 04/15/2022 414     Prot/Creat Ratio, Ur 04/15/2022 2 13 (A)    Miscellaneous Lab Test R* 04/15/2022 SEE WRITTEN REPORT          Radiology Results:   Colonoscopy    Result Date: 5/4/2022  Narrative: Scottie Gonzales [address] [place] [phone] DATE OF SERVICE: 5/04/22 PHYSICIAN(S): Attending: Gladys Luna DO Fellow: No Staff Documented INDICATION: Diarrhea, unspecified type POST-OP DIAGNOSIS: See the impression below  HISTORY: Prior colonoscopy: No prior colonoscopy  BOWEL PREPARATION: Miralax/Dulcolax PREPROCEDURE: Informed consent was obtained for the procedure, including sedation  Risks including but not limited to bleeding, infection, perforation, adverse drug reaction and aspiration were explained in detail  Also explained about less than 100% sensitivity with the exam and other alternatives  The patient was placed in the left lateral decubitus position  DETAILS OF PROCEDURE: Patient was taken to the procedure room where a time out was performed to confirm correct patient and correct procedure  The patient underwent monitored anesthesia care, which was administered by an anesthesia professional  The patient's blood pressure, heart rate, level of consciousness, oxygen and respirations were monitored throughout the procedure  A digital rectal exam was performed  The scope was introduced through the anus and advanced to the cecum  Retroflexion was performed in the rectum  The quality of bowel preparation was evaluated using the [facility] Bowel Preparation Scale with scores of: right colon = 2, transverse colon = 2, left colon = 2  The total BBPS score was 6  Bowel prep was adequate  The patient experienced no blood loss  The procedure was not difficult   The patient tolerated the procedure well  There were no apparent complications  ANESTHESIA INFORMATION: ASA: II Anesthesia Type: IV Sedation with Anesthesia MEDICATIONS: No administrations occurring from 1359 to 1424 on 05/04/22 FINDINGS: The cecum appeared normal  One 6 mm sessile, adenomatous-appearing polyp in the descending colon; removed by cold snare Few small, mild scattered diverticula in the sigmoid colon Small, internal hemorrhoids EVENTS: Procedure Events Event Event Time ENDO CECUM REACHED 5/4/2022  2:08 PM ENDO SCOPE OUT TIME 5/4/2022  2:21 PM SPECIMENS: ID Type Source Tests Collected by Time Destination 1 : descending colon polyp/cold snare Tissue Polyp, Colorectal TISSUE EXAM Ace Palomino DO 5/4/2022  2:16 PM  EQUIPMENT: Colonoscope -[id number] ENDOCUFF VISION LRG GREEN ID [id number]     Impression: One subcentimeter sessile polyp in the descending colon removed with cold snare polypectomy  Sigmoid diverticulosis  Internal hemorrhoids  RECOMMENDATION: Await pathology results Repeat colonoscopy in 7 years  High fiber diet    Jarett Company, DO

## 2022-05-13 NOTE — PROGRESS NOTES
[facility] - Outpatient Follow-up Note  Baldomero Rose 42 y o  male MRN: [id number]  Encounter: [id number]          ASSESSMENT AND PLAN:      1  Diverticulosis  Discussed importance of high fiber diet and drinking plenty of water daily  2  Polyp of descending colon, unspecified type  1 small sessile polyp was removed during colonoscopy next week  Biopsies are pending  We will contact him with results, however, preliminary recommendation is repeat colonoscopy in 7 years  3  Hepatic steatosis  Mild hepatic steatosis was found on recent abdominal ultrasound  NALFD fibrosis score -1 5 indicating low likelihood for significant fibrosis  We discussed goal weight loss of 10-15 lbs to improve the fatty liver  Encouraged him to avoid saturated fats and increase intake of vegetables, fruits  It is also important to limit alcohol intake  I explained that fatty liver can progress to inflammation and scarring of the liver  He can follow-up with PCP to check liver enzymes on a yearly basis  Follow-up as needed    ______________________________________________________________________    SUBJECTIVE:   73-year-old male with asthma, hypertension, membranous nephropathy presenting for follow-up of EGD and colonoscopy  I saw him in December 2021 for evaluation of diarrhea, abdominal pain, nausea, and bloating  he had EGD and colonoscopy in Feb 2022  EGD showed non-obstructing Schatzki's ring and small hiatal hernia otherwise normal endoscopy  Biopsies negative for H pylori and celiac disease  Colonoscopy showed poor prep, so we asked him to repeat with 2 day prep  Repeat colonoscopy in May showed 1 small polyp in the descending colon, sigmoid diverticulosis, and internal hemorrhoids  Biopsies are still pending  He is feeling much better overall  He has increased his water and fiber intake and is now having regular formed bowel movements   His other symptoms have significantly improved with simple dietary changes  Answers for HPI/ROS submitted by the patient on 5/12/2022  Chronicity: recurrent  Onset: more than 1 month ago  Onset quality: gradual  Frequency: intermittently  Episode duration: 1 hours  Progression since onset: gradually improving  Pain location: LLQ, RUQ  Pain - numeric: 2/10  Pain quality: aching  Radiates to: LLQ  anorexia: No  arthralgias: No  belching: No  constipation: No  diarrhea: No  dysuria: No  fever: No  flatus: Yes  frequency: No  headaches: No  hematochezia: No  hematuria: No  melena: No  myalgias: No  nausea: No  weight loss: No  vomiting: No  Aggravated by: movement  Relieved by: activity, belching, palpation  Diagnostic workup: lower endoscopy, ultrasound, upper endoscopy        REVIEW OF SYSTEMS IS OTHERWISE NEGATIVE        Historical Information   Past Medical History:   Diagnosis Date    Acute appendicitis     Asthma     Hypertension     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    Proteinuria      Past Surgical History:   Procedure Laterality Date    APPENDECTOMY      IR BIOPSY KIDNEY MASS  2/21/2019    SHOULDER SURGERY      TRIGGER FINGER RELEASE       Social History   Social History     Substance and Sexual Activity   Alcohol Use Yes    Comment: occasional     Social History     Substance and Sexual Activity   Drug Use No     Social History     Tobacco Use   Smoking Status Never Smoker   Smokeless Tobacco Never Used     Family History   Problem Relation Age of Onset    No Known Problems Mother     Diabetes Father     Kidney failure Father     Diabetes Family     No Known Problems Sister     Ulcers Brother        Meds/Allergies       Current Outpatient Medications:     albuterol (2 5 mg/3 mL) 0 083 % nebulizer solution    albuterol (PROVENTIL HFA,VENTOLIN HFA) 90 mcg/act inhaler    LORazepam (Ativan) 0 5 mg tablet    losartan (COZAAR) 100 MG tablet    mometasone-formoterol (Dulera) 200-5 MCG/ACT inhaler    Multiple Vitamin (multivitamin) capsule    Allergies   Allergen Reactions    Molds & Smuts Shortness Of Breath and Rash    Other Shortness Of Breath    Shellfish Allergy - Food Allergy Hives    Shellfish-Derived Products - Food Allergy Hives           Objective     Blood pressure 116/78, temperature 98 °F (36 7 °C), temperature source Tympanic, height 5' 2" (1 575 m), weight 67 kg (147 lb 12 8 oz)  Body mass index is 27 03 kg/m²  PHYSICAL EXAM:      General Appearance:   Alert, cooperative, no distress   HEENT:   Normocephalic, atraumatic, anicteric      Neck:  Supple, symmetrical, trachea midline   Lungs:   Clear to auscultation bilaterally; no rales, rhonchi or wheezing; respirations unlabored    Heart[de-identified]   Regular rate and rhythm; no murmur, rub, or gallop  Abdomen:   Soft, non-tender, non-distended; normal bowel sounds; no masses, no organomegaly    Genitalia:   Deferred    Rectal:   Deferred    Extremities:  No cyanosis, clubbing or edema    Pulses:  2+ and symmetric    Skin:  No jaundice, rashes, or lesions    Lymph nodes:  No palpable cervical lymphadenopathy        Lab Results:   No visits with results within 1 Day(s) from this visit     Latest known visit with results is:   Appointment on 04/15/2022   Component Date Value    Sodium 04/15/2022 140     Potassium 04/15/2022 4 3     Chloride 04/15/2022 108     CO2 04/15/2022 30     ANION GAP 04/15/2022 2 (A)    BUN 04/15/2022 13     Creatinine 04/15/2022 0 98     Glucose, Fasting 04/15/2022 104 (A)    Calcium 04/15/2022 9 1     eGFR 04/15/2022 93     Creatinine, Ur 04/15/2022 194 0     Protein Urine Random 04/15/2022 414     Prot/Creat Ratio, Ur 04/15/2022 2 13 (A)    Miscellaneous Lab Test R* 04/15/2022 SEE WRITTEN REPORT          Radiology Results:   Colonoscopy    Result Date: 5/4/2022  Narrative: Scottie Gonzales [address] [place] [address] [phone] DATE OF SERVICE: 5/04/22 PHYSICIAN(S): Attending: Dayana Thomas DO Fellow: No Staff Documented INDICATION: Diarrhea, unspecified type POST-OP DIAGNOSIS: See the impression below  HISTORY: Prior colonoscopy: No prior colonoscopy  BOWEL PREPARATION: Miralax/Dulcolax PREPROCEDURE: Informed consent was obtained for the procedure, including sedation  Risks including but not limited to bleeding, infection, perforation, adverse drug reaction and aspiration were explained in detail  Also explained about less than 100% sensitivity with the exam and other alternatives  The patient was placed in the left lateral decubitus position  DETAILS OF PROCEDURE: Patient was taken to the procedure room where a time out was performed to confirm correct patient and correct procedure  The patient underwent monitored anesthesia care, which was administered by an anesthesia professional  The patient's blood pressure, heart rate, level of consciousness, oxygen and respirations were monitored throughout the procedure  A digital rectal exam was performed  The scope was introduced through the anus and advanced to the cecum  Retroflexion was performed in the rectum  The quality of bowel preparation was evaluated using the [facility] Bowel Preparation Scale with scores of: right colon = 2, transverse colon = 2, left colon = 2  The total BBPS score was 6  Bowel prep was adequate  The patient experienced no blood loss  The procedure was not difficult  The patient tolerated the procedure well  There were no apparent complications   ANESTHESIA INFORMATION: ASA: II Anesthesia Type: IV Sedation with Anesthesia MEDICATIONS: No administrations occurring from 1359 to 1424 on 05/04/22 FINDINGS: The cecum appeared normal  One 6 mm sessile, adenomatous-appearing polyp in the descending colon; removed by cold snare Few small, mild scattered diverticula in the sigmoid colon Small, internal hemorrhoids EVENTS: Procedure Events Event Event Time ENDO CECUM REACHED 5/4/2022  2:08 PM ENDO SCOPE OUT TIME 5/4/2022 2:21 PM SPECIMENS: ID Type Source Tests Collected by Time Destination 1 : descending colon polyp/cold snare Tissue Polyp, Colorectal TISSUE EXAM Cynthia Palomino DO 5/4/2022  2:16 PM  EQUIPMENT: Colonoscope -[id number] ENDOCUFF VISION LRG GREEN ID [id number]     Impression: One subcentimeter sessile polyp in the descending colon removed with cold snare polypectomy  Sigmoid diverticulosis  Internal hemorrhoids  RECOMMENDATION: Await pathology results Repeat colonoscopy in 7 years  High fiber diet    [address] [facility]

## 2022-05-31 ENCOUNTER — APPOINTMENT (EMERGENCY)
Dept: RADIOLOGY | Facility: HOSPITAL | Age: 47
End: 2022-05-31
Payer: COMMERCIAL

## 2022-05-31 ENCOUNTER — HOSPITAL ENCOUNTER (EMERGENCY)
Facility: HOSPITAL | Age: 47
Discharge: HOME | End: 2022-06-01
Attending: EMERGENCY MEDICINE
Payer: COMMERCIAL

## 2022-05-31 DIAGNOSIS — I10 HYPERTENSION: ICD-10-CM

## 2022-05-31 DIAGNOSIS — G44.029 CHRONIC CLUSTER HEADACHE, NOT INTRACTABLE: ICD-10-CM

## 2022-05-31 DIAGNOSIS — R07.9 CHEST PAIN: Primary | ICD-10-CM

## 2022-05-31 LAB
ALBUMIN SERPL BCP-MCNC: 3.3 G/DL (ref 3.5–5)
ALP SERPL-CCNC: 47 U/L (ref 46–116)
ALT SERPL W P-5'-P-CCNC: 45 U/L (ref 12–78)
ANION GAP SERPL CALCULATED.3IONS-SCNC: 0 MMOL/L (ref 4–13)
AST SERPL W P-5'-P-CCNC: 37 U/L (ref 5–45)
BASOPHILS # BLD AUTO: 0.02 THOUSANDS/ΜL (ref 0–0.1)
BASOPHILS NFR BLD AUTO: 0 % (ref 0–1)
BILIRUB SERPL-MCNC: 0.32 MG/DL (ref 0.2–1)
BUN SERPL-MCNC: 12 MG/DL (ref 5–25)
CALCIUM ALBUM COR SERPL-MCNC: 10 MG/DL (ref 8.3–10.1)
CALCIUM SERPL-MCNC: 9.4 MG/DL (ref 8.3–10.1)
CARDIAC TROPONIN I PNL SERPL HS: 8 NG/L
CHLORIDE SERPL-SCNC: 109 MMOL/L (ref 100–108)
CO2 SERPL-SCNC: 29 MMOL/L (ref 21–32)
CREAT SERPL-MCNC: 0.98 MG/DL (ref 0.6–1.3)
EOSINOPHIL # BLD AUTO: 0.21 THOUSAND/ΜL (ref 0–0.61)
EOSINOPHIL NFR BLD AUTO: 4 % (ref 0–6)
ERYTHROCYTE [DISTWIDTH] IN BLOOD BY AUTOMATED COUNT: 12.2 % (ref 11.6–15.1)
GFR SERPL CREATININE-BSD FRML MDRD: 93 ML/MIN/1.73SQ M
GLUCOSE SERPL-MCNC: 87 MG/DL (ref 65–140)
HCT VFR BLD AUTO: 47 % (ref 36.5–49.3)
HGB BLD-MCNC: 15.1 G/DL (ref 12–17)
IMM GRANULOCYTES # BLD AUTO: 0.02 THOUSAND/UL (ref 0–0.2)
IMM GRANULOCYTES NFR BLD AUTO: 0 % (ref 0–2)
LYMPHOCYTES # BLD AUTO: 2.36 THOUSANDS/ΜL (ref 0.6–4.47)
LYMPHOCYTES NFR BLD AUTO: 43 % (ref 14–44)
MCH RBC QN AUTO: 28.7 PG (ref 26.8–34.3)
MCHC RBC AUTO-ENTMCNC: 32.1 G/DL (ref 31.4–37.4)
MCV RBC AUTO: 89 FL (ref 82–98)
MONOCYTES # BLD AUTO: 0.44 THOUSAND/ΜL (ref 0.17–1.22)
MONOCYTES NFR BLD AUTO: 8 % (ref 4–12)
NEUTROPHILS # BLD AUTO: 2.5 THOUSANDS/ΜL (ref 1.85–7.62)
NEUTS SEG NFR BLD AUTO: 45 % (ref 43–75)
NRBC BLD AUTO-RTO: 0 /100 WBCS
PLATELET # BLD AUTO: 170 THOUSANDS/UL (ref 149–390)
PMV BLD AUTO: 10.9 FL (ref 8.9–12.7)
POTASSIUM SERPL-SCNC: 4.1 MMOL/L (ref 3.5–5.3)
PROT SERPL-MCNC: 6.4 G/DL (ref 6.4–8.2)
RBC # BLD AUTO: 5.27 MILLION/UL (ref 3.88–5.62)
SODIUM SERPL-SCNC: 138 MMOL/L (ref 136–145)
WBC # BLD AUTO: 5.55 THOUSAND/UL (ref 4.31–10.16)

## 2022-05-31 PROCEDURE — 80053 COMPREHEN METABOLIC PANEL: CPT | Performed by: EMERGENCY MEDICINE

## 2022-05-31 PROCEDURE — 93005 ELECTROCARDIOGRAM TRACING: CPT

## 2022-05-31 PROCEDURE — 99285 EMERGENCY DEPT VISIT HI MDM: CPT | Performed by: EMERGENCY MEDICINE

## 2022-05-31 PROCEDURE — 84484 ASSAY OF TROPONIN QUANT: CPT | Performed by: EMERGENCY MEDICINE

## 2022-05-31 PROCEDURE — 85025 COMPLETE CBC W/AUTO DIFF WBC: CPT | Performed by: EMERGENCY MEDICINE

## 2022-05-31 PROCEDURE — 84100 ASSAY OF PHOSPHORUS: CPT

## 2022-05-31 PROCEDURE — 83735 ASSAY OF MAGNESIUM: CPT

## 2022-05-31 PROCEDURE — 99285 EMERGENCY DEPT VISIT HI MDM: CPT

## 2022-05-31 PROCEDURE — 71046 X-RAY EXAM CHEST 2 VIEWS: CPT

## 2022-05-31 PROCEDURE — 36415 COLL VENOUS BLD VENIPUNCTURE: CPT

## 2022-05-31 RX ORDER — KETOROLAC TROMETHAMINE 30 MG/ML
15 INJECTION, SOLUTION INTRAMUSCULAR; INTRAVENOUS ONCE
Status: DISCONTINUED | OUTPATIENT
Start: 2022-05-31 | End: 2022-06-01 | Stop reason: HOSPADM

## 2022-06-01 VITALS
DIASTOLIC BLOOD PRESSURE: 89 MMHG | OXYGEN SATURATION: 96 % | SYSTOLIC BLOOD PRESSURE: 160 MMHG | HEART RATE: 56 BPM | RESPIRATION RATE: 16 BRPM | TEMPERATURE: 97.8 F

## 2022-06-01 LAB
2HR DELTA HS TROPONIN: 1 NG/L
ATRIAL RATE: 72 BPM
CARDIAC TROPONIN I PNL SERPL HS: 9 NG/L
P AXIS: 74 DEGREES
PR INTERVAL: 144 MS
QRS AXIS: 60 DEGREES
QRSD INTERVAL: 90 MS
QT INTERVAL: 362 MS
QTC INTERVAL: 396 MS
T WAVE AXIS: 80 DEGREES
VENTRICULAR RATE: 72 BPM

## 2022-06-01 PROCEDURE — 84484 ASSAY OF TROPONIN QUANT: CPT | Performed by: EMERGENCY MEDICINE

## 2022-06-01 PROCEDURE — 36415 COLL VENOUS BLD VENIPUNCTURE: CPT | Performed by: EMERGENCY MEDICINE

## 2022-06-01 PROCEDURE — 93010 ELECTROCARDIOGRAM REPORT: CPT | Performed by: INTERNAL MEDICINE

## 2022-06-01 NOTE — ED PROVIDER NOTES
History  Chief Complaint   Patient presents with    Chest Pain     Pt presents ambulatory with c/o L chest wall pain, dizziness on and off since yesterday  72-year-old male with history of hypertension presents to the emergency department for evaluation of chest pain  The patient states that his chest pain started around 4:00 p m  This afternoon  Reports that the pain felt like a 7 of 10 left-sided chest pressure  He reports that the pain does not radiate and is not worse with exertion  He took an Aleve soon after the pain started which brought his pain down to a 3 or a 4  He states that approximately an hour prior to arrival the pain worsened to a 7 again so came to the emergency department for further evaluation  He reports that he has never had similar pain before  He reports that he takes losartan for high blood pressure but only takes it on days with his blood pressure is over 489 systolic  He states that he checks his blood pressure every day and did take losartan earlier  He denies headache, blurry vision, neck pain/stiffness, shortness of breath, diaphoresis, fever, chills, nausea, vomiting, diarrhea, sick contacts and localized numbness, tingling or weakness  The patient states that he did workout his chest and biceps today with weights  Prior to Admission Medications   Prescriptions Last Dose Informant Patient Reported? Taking?    LORazepam (Ativan) 0 5 mg tablet   No No   Si/2-1 qhs prn   Multiple Vitamin (multivitamin) capsule   Yes No   Sig: Take 1 capsule by mouth daily   albuterol (2 5 mg/3 mL) 0 083 % nebulizer solution   No No   Sig: Take 3 mL (2 5 mg total) by nebulization every 4 (four) hours as needed for wheezing or shortness of breath   albuterol (PROVENTIL HFA,VENTOLIN HFA) 90 mcg/act inhaler   No No   Sig: TAKE 2 PUFFS BY MOUTH EVERY 4 HOURS AS NEEDED FOR WHEEZE   losartan (COZAAR) 100 MG tablet   No No   Sig: Take 1 tablet (100 mg total) by mouth daily mometasone-formoterol (Dulera) 200-5 MCG/ACT inhaler   No No   Sig: Inhale 2 puffs 2 (two) times a day Rinse mouth after use  Facility-Administered Medications: None       Past Medical History:   Diagnosis Date    Acute appendicitis     Asthma     Hypertension     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    Proteinuria        Past Surgical History:   Procedure Laterality Date    APPENDECTOMY      IR BIOPSY KIDNEY MASS  2/21/2019    SHOULDER SURGERY      TRIGGER FINGER RELEASE         Family History   Problem Relation Age of Onset    No Known Problems Mother     Diabetes Father     Kidney failure Father     Diabetes Family     No Known Problems Sister     Ulcers Brother      I have reviewed and agree with the history as documented  E-Cigarette/Vaping    E-Cigarette Use Never User      E-Cigarette/Vaping Substances     Social History     Tobacco Use    Smoking status: Never Smoker    Smokeless tobacco: Never Used   Vaping Use    Vaping Use: Never used   Substance Use Topics    Alcohol use: Yes     Comment: occasional    Drug use: No        Review of Systems   Constitutional: Negative for chills and fever  HENT: Negative for ear pain and sore throat  Eyes: Negative for pain and visual disturbance  Respiratory: Negative for cough and shortness of breath  Cardiovascular: Positive for chest pain  Negative for palpitations  Gastrointestinal: Negative for abdominal pain and vomiting  Genitourinary: Negative for dysuria and hematuria  Musculoskeletal: Negative for arthralgias and back pain  Skin: Negative for color change and rash  Neurological: Negative for seizures and syncope  All other systems reviewed and are negative        Physical Exam  ED Triage Vitals   Temperature Pulse Respirations Blood Pressure SpO2   05/31/22 2152 05/31/22 2152 05/31/22 2152 05/31/22 2152 05/31/22 2152   97 8 °F (36 6 °C) 72 18 (!) 204/107 98 %      Temp Source Heart Rate Source Patient Position - Orthostatic VS BP Location FiO2 (%)   05/31/22 2152 05/31/22 2152 05/31/22 2248 05/31/22 2248 --   Temporal Monitor Lying Right arm       Pain Score       05/31/22 2247       6             Orthostatic Vital Signs  Vitals:    05/31/22 2152 05/31/22 2248 06/01/22 0024   BP: (!) 204/107 (!) 189/101 160/89   Pulse: 72 66 56   Patient Position - Orthostatic VS:  Lying        Physical Exam  Vitals and nursing note reviewed  Constitutional:       Appearance: He is well-developed  HENT:      Head: Normocephalic and atraumatic  Eyes:      Conjunctiva/sclera: Conjunctivae normal    Cardiovascular:      Rate and Rhythm: Normal rate and regular rhythm  Pulses:           Radial pulses are 2+ on the right side and 2+ on the left side  Heart sounds: No murmur heard  Pulmonary:      Effort: Pulmonary effort is normal  No respiratory distress  Breath sounds: Normal breath sounds  Abdominal:      Palpations: Abdomen is soft  Tenderness: There is no abdominal tenderness  Musculoskeletal:      Cervical back: Neck supple  Skin:     General: Skin is warm and dry  Neurological:      Mental Status: He is alert  Sensory: Sensation is intact  Motor: Motor function is intact           ED Medications  Medications   ketorolac (TORADOL) injection 15 mg (15 mg Intravenous Not Given 5/31/22 2247)       Diagnostic Studies  Results Reviewed     Procedure Component Value Units Date/Time    HS Troponin I 2hr [[id number]]  (Normal) Collected: 06/01/22 0023    Lab Status: Final result Specimen: Blood from Arm, Left Updated: 06/01/22 0107     hs TnI 2hr 9 ng/L      Delta 2hr hsTnI 1 ng/L     HS Troponin I 4hr [[id number]]     Lab Status: No result Specimen: Blood     HS Troponin 0hr (reflex protocol) [[id number]]  (Normal) Collected: 05/31/22 2203    Lab Status: Final result Specimen: Blood from Arm, Left Updated: 05/31/22 2247     hs TnI 0hr 8 ng/L     Comprehensive metabolic panel [[id number]] (Abnormal) Collected: 05/31/22 2203    Lab Status: Final result Specimen: Blood from Arm, Left Updated: 05/31/22 2240     Sodium 138 mmol/L      Potassium 4 1 mmol/L      Chloride 109 mmol/L      CO2 29 mmol/L      ANION GAP 0 mmol/L      BUN 12 mg/dL      Creatinine 0 98 mg/dL      Glucose 87 mg/dL      Calcium 9 4 mg/dL      Corrected Calcium 10 0 mg/dL      AST 37 U/L      ALT 45 U/L      Alkaline Phosphatase 47 U/L      Total Protein 6 4 g/dL      Albumin 3 3 g/dL      Total Bilirubin 0 32 mg/dL      eGFR 93 ml/min/1 73sq m     Narrative:      National Kidney Disease Foundation guidelines for Chronic Kidney Disease (CKD):     Stage 1 with normal or high GFR (GFR > 90 mL/min/1 73 square meters)    Stage 2 Mild CKD (GFR = 60-89 mL/min/1 73 square meters)    Stage 3A Moderate CKD (GFR = 45-59 mL/min/1 73 square meters)    Stage 3B Moderate CKD (GFR = 30-44 mL/min/1 73 square meters)    Stage 4 Severe CKD (GFR = 15-29 mL/min/1 73 square meters)    Stage 5 End Stage CKD (GFR <15 mL/min/1 73 square meters)  Note: GFR calculation is accurate only with a steady state creatinine    CBC and differential [[id number]] Collected: 05/31/22 2203    Lab Status: Final result Specimen: Blood from Arm, Left Updated: 05/31/22 2226     WBC 5 55 Thousand/uL      RBC 5 27 Million/uL      Hemoglobin 15 1 g/dL      Hematocrit 47 0 %      MCV 89 fL      MCH 28 7 pg      MCHC 32 1 g/dL      RDW 12 2 %      MPV 10 9 fL      Platelets 442 Thousands/uL      nRBC 0 /100 WBCs      Neutrophils Relative 45 %      Immat GRANS % 0 %      Lymphocytes Relative 43 %      Monocytes Relative 8 %      Eosinophils Relative 4 %      Basophils Relative 0 %      Neutrophils Absolute 2 50 Thousands/µL      Immature Grans Absolute 0 02 Thousand/uL      Lymphocytes Absolute 2 36 Thousands/µL      Monocytes Absolute 0 44 Thousand/µL      Eosinophils Absolute 0 21 Thousand/µL      Basophils Absolute 0 02 Thousands/µL                  XR chest 2 views (Results Pending)         Procedures  ECG 12 Lead Documentation Only    Date/Time: 5/31/2022 10:41 PM  Performed by: Alicia Lange MD  Authorized by: Alciia Lange MD     ECG reviewed by me, the ED Provider: yes    Patient location:  ED  Previous ECG:     Previous ECG:  Unavailable    Comparison to cardiac monitor: Yes    Interpretation:     Interpretation: normal    Rate:     ECG rate assessment: normal    Rhythm:     Rhythm: sinus rhythm    Ectopy:     Ectopy: none    QRS:     QRS axis:  Normal  Conduction:     Conduction: normal    ST segments:     ST segments:  Normal  T waves:     T waves: normal            ED Course             HEART Risk Score    Flowsheet Row Most Recent Value   Heart Score Risk Calculator    History 0 Filed at: 06/01/2022 0137   ECG 0 Filed at: 06/01/2022 6166   Age 0 Filed at: 06/01/2022 2404   Risk Factors 1 Filed at: 06/01/2022 0137   Troponin 0 Filed at: 06/01/2022 6664   HEART Score 1 Filed at: 06/01/2022 0137                                MDM  Number of Diagnoses or Management Options  Chest pain  Hypertension  Diagnosis management comments: 51-year-old male presented to the emergency department for evaluation of chest pain  On arrival the patient was awake, alert, oriented and in no acute distress  Initial vital signs show that the patient was hypertensive but otherwise his vital signs were stable  Workup done the emergency department showed the patient had a nonischemic EKG, negative delta troponin and chest x-ray with no acute cardiopulmonary process  The patient was treated symptomatically and on re-evaluation he reported improvement of symptoms  All diagnostic studies were discussed with the patient in detail  He is appropriate for discharge at this time with recommendation to follow up with his PCP for continued symptoms  Return precautions were discussed  Patient agrees with the plan for discharge and feels comfortable to go home with proper f/u  Advised to return for worsening or additional problems  Diagnostic tests were reviewed and questions answered  Diagnosis, care plan and treatment options were discussed  The patient understands instructions and will follow up as directed  Disposition  Final diagnoses:   Chest pain   Hypertension     Time reflects when diagnosis was documented in both MDM as applicable and the Disposition within this note     Time User Action Codes Description Comment    6/1/2022  1:37 AM Vinetta Fill Add [R07 9] Chest pain     6/1/2022  1:37 AM Vinetta Fill Add [I10] Hypertension       ED Disposition     ED Disposition   Discharge    Condition   Stable    Date/Time   Wed Jun 1, 2022  1:37 AM    Comment   Gee Vázquez discharge to home/self care                 Follow-up Information     Follow up With Specialties Details Why Contact Info Additional Information    Deena Metcalf MD Family Medicine Schedule an appointment as soon as possible for a visit   [address] [place] [id number] [facility] 160 Emergency Department Emergency Medicine Go to  If symptoms worsen [address] [phone]  950 [facility] 64 [facility] Emergency Department, [address], [address]          Discharge Medication List as of 6/1/2022  1:38 AM      CONTINUE these medications which have NOT CHANGED    Details   albuterol (2 5 mg/3 mL) 0 083 % nebulizer solution Take 3 mL (2 5 mg total) by nebulization every 4 (four) hours as needed for wheezing or shortness of breath, Starting Wed 12/29/2021, Normal      albuterol (PROVENTIL HFA,VENTOLIN HFA) 90 mcg/act inhaler TAKE 2 PUFFS BY MOUTH EVERY 4 HOURS AS NEEDED FOR WHEEZE, Normal      LORazepam (Ativan) 0 5 mg tablet 1/2-1 qhs prn, Normal      losartan (COZAAR) 100 MG tablet Take 1 tablet (100 mg total) by mouth daily, Starting u 4/21/2022, Normal mometasone-formoterol (Dulera) 200-5 MCG/ACT inhaler Inhale 2 puffs 2 (two) times a day Rinse mouth after use , Starting Mon 3/15/2021, Normal      Multiple Vitamin (multivitamin) capsule Take 1 capsule by mouth daily, Historical Med           No discharge procedures on file  PDMP Review     None           ED Provider  Attending physically available and evaluated Jailyn Jacobson I managed the patient along with the ED Attending      Electronically Signed by         Veronique Douglass MD  06/01/22 6323

## 2022-06-01 NOTE — ED ATTENDING ATTESTATION
5/31/2022  Maryam Mata DO, saw and evaluated the patient  I have discussed the patient with the resident/non-physician practitioner and agree with the resident's/non-physician practitioner's findings, Plan of Care, and MDM as documented in the resident's/non-physician practitioner's note, except where noted  All available labs and Radiology studies were reviewed  I was present for key portions of any procedure(s) performed by the resident/non-physician practitioner and I was immediately available to provide assistance  At this point I agree with the current assessment done in the Emergency Department  I have conducted an independent evaluation of this patient a history and physical is as follows:    43 yo male presents for evaluation of chest pain localized over the L chest wall without radiation of pain  No dyspnea, f/c/n/v, diaphoresis, leg pain or swelling  Improved with OTC meds PTA  Took his losartan this AM  Normally BP is 332-984 systolic  C/o L arm paresthesias as well  No other associated symptoms  No other a/e factors  Currently symptoms improved, still there but mild 3/10  General: VS reviewed  Appears in NAD  awake, alert  Well-nourished, well-developed  Appears stated age  Speaking normally in full sentences  Head: Normocephalic, atraumatic  Eyes: EOM-I  No diplopia  No hyphema  No subconjunctival hemorrhages  Symmetrical lids  ENT: Atraumatic external nose and ears  MMM  No malocclusion  No stridor  Normal phonation  No drooling  Normal swallowing  Neck: No JVD  No carotid bruits bilaterally  CV: No pallor noted  Lungs:   No tachypnea  No respiratory distress  MSK:   FROM spontaneously  Skin: Dry, intact  Neuro: Awake, alert, GCS15, CN II-XII grossly intact  Motor grossly intact  Psychiatric/Behavioral: Appropriate mood and affect   Exam: deferred    Imp: chest pain, likely non cardiac  HTN plan: cardiac eval, delta ECG+trop, tx sx, reassess      ED Course         Critical Care Time  Procedures

## 2022-06-03 ENCOUNTER — OFFICE VISIT (OUTPATIENT)
Dept: FAMILY MEDICINE CLINIC | Facility: CLINIC | Age: 47
End: 2022-06-03
Payer: COMMERCIAL

## 2022-06-03 VITALS
OXYGEN SATURATION: 98 % | BODY MASS INDEX: 27.05 KG/M2 | HEART RATE: 70 BPM | HEIGHT: 62 IN | SYSTOLIC BLOOD PRESSURE: 122 MMHG | DIASTOLIC BLOOD PRESSURE: 80 MMHG | WEIGHT: 147 LBS

## 2022-06-03 DIAGNOSIS — S09.90XA TRAUMATIC HEAD INJURY LESS THAN 3 MONTHS AGO, INITIAL ENCOUNTER: ICD-10-CM

## 2022-06-03 DIAGNOSIS — G44.029 CHRONIC CLUSTER HEADACHE, NOT INTRACTABLE: Primary | ICD-10-CM

## 2022-06-03 DIAGNOSIS — I10 ESSENTIAL HYPERTENSION: ICD-10-CM

## 2022-06-03 DIAGNOSIS — S29.011D MUSCLE STRAIN OF CHEST WALL, SUBSEQUENT ENCOUNTER: ICD-10-CM

## 2022-06-03 DIAGNOSIS — J45.40 MODERATE PERSISTENT ASTHMA WITHOUT COMPLICATION: ICD-10-CM

## 2022-06-03 PROBLEM — S29.011A CHEST WALL MUSCLE STRAIN: Status: ACTIVE | Noted: 2022-06-03

## 2022-06-03 LAB
MAGNESIUM SERPL-MCNC: 2 MG/DL (ref 1.6–2.6)
PHOSPHATE SERPL-MCNC: 4.7 MG/DL (ref 2.7–4.5)

## 2022-06-03 PROCEDURE — 99214 OFFICE O/P EST MOD 30 MIN: CPT | Performed by: NURSE PRACTITIONER

## 2022-06-03 PROCEDURE — 1036F TOBACCO NON-USER: CPT | Performed by: NURSE PRACTITIONER

## 2022-06-03 PROCEDURE — 3008F BODY MASS INDEX DOCD: CPT | Performed by: NURSE PRACTITIONER

## 2022-06-03 RX ORDER — SUMATRIPTAN 50 MG/1
50 TABLET, FILM COATED ORAL AS NEEDED
Qty: 9 TABLET | Refills: 0 | Status: SHIPPED | OUTPATIENT
Start: 2022-06-03

## 2022-06-03 NOTE — ASSESSMENT & PLAN NOTE
Patient's neurological assessment was normal in the office today however, due to the recurrent nature of the headaches and his previous head injury I would like to rule out any acute intracranial bleeds or other abnormalities  CT of the head was ordered  Patient was advised that if he begins to experience any CVA symptoms such as dysarthria, weakness, or drooping of the face he must be evaluated in the ED immediately  Imitrex was ordered to be used as needed to help with headaches  Magnesium and phosphorus levels were ordered to assess for electrolyte deficiencies which could be causing headaches

## 2022-06-03 NOTE — PROGRESS NOTES
Assessment and Plan:    Problem List Items Addressed This Visit        Respiratory    Moderate persistent asthma     Well controlled on current regimen  Cardiovascular and Mediastinum    Essential hypertension     Well controlled on current regimen  Nervous and Auditory    Chronic cluster headache, not intractable - Primary     Patient's neurological assessment was normal in the office today however, due to the recurrent nature of the headaches and his previous head injury I would like to rule out any acute intracranial bleeds or other abnormalities  CT of the head was ordered  Patient was advised that if he begins to experience any CVA symptoms such as dysarthria, weakness, or drooping of the face he must be evaluated in the ED immediately  Imitrex was ordered to be used as needed to help with headaches  Magnesium and phosphorus levels were ordered to assess for electrolyte deficiencies which could be causing headaches  Relevant Medications    SUMAtriptan (Imitrex) 50 mg tablet    Other Relevant Orders    Magnesium    Phosphorus    CT head wo contrast       Musculoskeletal and Integument    Chest wall muscle strain     This appears to be resolving at this point  Patient was advised to continue NSAIDs and ice as needed  Other Visit Diagnoses     Traumatic head injury less than 3 months ago, initial encounter        Relevant Orders    CT head wo contrast                 Diagnoses and all orders for this visit:    Chronic cluster headache, not intractable  -     Magnesium; Future  -     Phosphorus; Future  -     CT head wo contrast; Future  -     SUMAtriptan (Imitrex) 50 mg tablet; Take 1 tablet (50 mg total) by mouth if needed for migraine for up to 1 dose Take as needed every 2 hours for a maximum of 4 doses (200 mg) in 24 hours      Traumatic head injury less than 3 months ago, initial encounter  -     CT head wo contrast; Future    Moderate persistent asthma without complication    Essential hypertension    Muscle strain of chest wall, subsequent encounter            Subjective:      Patient ID: Paddy Banks is a 42 y o  male  CC:    Chief Complaint   Patient presents with    Chest Pain     Pt was told to use Motrin as the Chest pain was muscular per ED  He states it helps with the chest pain and some of the head pain  kw    Headache     Pt  states he has been having pain and pressure in his head  He mentioned this at the ED but they did not address the issue  He states his BP has been good so he does not think this was the issue  The pain is at the top of his head and sometimes the forehead  kw       HPI:    Chest pain:  Patient presented to the ED on 05/31/2022 for complaints of left chest wall pain and dizziness  EKG performed in the ED showed normal sinus rhythm  Patient's troponins were negative and his chest x-ray was normal  The patient was diagnosed with a chest muscle strain and was advised to take NSAID's PRN  Patient reports that he has been taking Motrin 2 times per day since he was in the ED and his chest pain has resolved for the most part at this point  He reports he has also been using ice on the area as needed  Hypertension:  Well controlled on current dosage of losartan  Patient denies any lightheadedness, dizziness, or orthostasis  Asthma:  Well controlled with b i d  use of Dulera and as needed use of albuterol  Patient reports he rarely needs to use his albuterol inhaler  Headaches: The patient reports he has been having pressure around the top of his head daily over the past few weeks  He does report that he does have intermittent episodes of dizziness but he denies any orthostasis  He does report his BP has remained stable at home  He denies any vision changes, weakness in his body, dysarthria, or asymetry of his face  The patient reports that he takes Aleve as needed for this and it does improve his symptoms   The patient reports that he was hit in the head with a [employer] lift at work a few weeks ago but he does not remember if the headaches were occurring before this event  The following portions of the patient's history were reviewed and updated as appropriate: allergies, current medications, past family history, past medical history, past social history, past surgical history and problem list       Review of Systems   Constitutional: Negative for chills and fever  HENT: Negative for ear pain and sore throat  Eyes: Negative for pain and visual disturbance  Respiratory: Negative for cough, chest tightness, shortness of breath and wheezing  Cardiovascular: Negative for chest pain, palpitations and leg swelling  Gastrointestinal: Negative for abdominal pain, constipation, diarrhea, nausea and vomiting  Endocrine: Negative for cold intolerance and heat intolerance  Genitourinary: Negative for dysuria and hematuria  Musculoskeletal: Negative for arthralgias and back pain  Skin: Negative for color change and rash  Allergic/Immunologic: Negative for environmental allergies  Neurological: Positive for headaches (recurrent-top of head)  Negative for dizziness, tremors, seizures, syncope, facial asymmetry, speech difficulty, weakness, light-headedness and numbness  Hematological: Negative for adenopathy  Psychiatric/Behavioral: Negative for confusion  The patient is not nervous/anxious  All other systems reviewed and are negative  Data to review:       Objective:    Vitals:    06/03/22 1133   BP: 122/80   BP Location: Right arm   Patient Position: Sitting   Pulse: 70   SpO2: 98%   Weight: 66 7 kg (147 lb)   Height: 5' 2" (1 575 m)        Physical Exam  Vitals and nursing note reviewed  Constitutional:       General: He is not in acute distress  Appearance: Normal appearance  He is well-developed  He is not ill-appearing  HENT:      Head: Normocephalic and atraumatic     Eyes:      Extraocular Movements: Extraocular movements intact  Right eye: Normal extraocular motion and no nystagmus  Left eye: Normal extraocular motion and no nystagmus  Conjunctiva/sclera: Conjunctivae normal       Pupils: Pupils are equal, round, and reactive to light  Pupils are equal    Cardiovascular:      Rate and Rhythm: Normal rate and regular rhythm  Pulses: Normal pulses  Carotid pulses are 2+ on the right side and 2+ on the left side  Radial pulses are 2+ on the right side and 2+ on the left side  Posterior tibial pulses are 2+ on the right side and 2+ on the left side  Heart sounds: Normal heart sounds  No murmur heard  Pulmonary:      Effort: Pulmonary effort is normal  No respiratory distress  Breath sounds: Normal breath sounds  No wheezing or rhonchi  Abdominal:      General: Abdomen is flat  Bowel sounds are normal  There is no distension  Palpations: Abdomen is soft  Tenderness: There is no abdominal tenderness  There is no guarding  Musculoskeletal:         General: Normal range of motion  Cervical back: Normal range of motion and neck supple  Right lower leg: No edema  Left lower leg: No edema  Skin:     General: Skin is warm and dry  Capillary Refill: Capillary refill takes less than 2 seconds  Neurological:      General: No focal deficit present  Mental Status: He is alert and oriented to person, place, and time  Cranial Nerves: Cranial nerves are intact  Sensory: Sensation is intact  Motor: Motor function is intact  Coordination: Coordination is intact  Gait: Gait is intact  Deep Tendon Reflexes: Reflexes are normal and symmetric  Psychiatric:         Mood and Affect: Mood normal          Behavior: Behavior normal          Thought Content:  Thought content normal          Judgment: Judgment normal

## 2022-06-03 NOTE — ASSESSMENT & PLAN NOTE
This appears to be resolving at this point  Patient was advised to continue NSAIDs and ice as needed

## 2022-06-09 ENCOUNTER — HOSPITAL ENCOUNTER (OUTPATIENT)
Dept: RADIOLOGY | Facility: HOSPITAL | Age: 47
Discharge: HOME | End: 2022-06-09
Payer: COMMERCIAL

## 2022-06-09 DIAGNOSIS — J01.00 ACUTE NON-RECURRENT MAXILLARY SINUSITIS: Primary | ICD-10-CM

## 2022-06-09 DIAGNOSIS — R93.89 ABNORMAL CT SCAN: ICD-10-CM

## 2022-06-09 DIAGNOSIS — G44.029 CHRONIC CLUSTER HEADACHE, NOT INTRACTABLE: ICD-10-CM

## 2022-06-09 DIAGNOSIS — G44.029 CHRONIC CLUSTER HEADACHE, NOT INTRACTABLE: Primary | ICD-10-CM

## 2022-06-09 DIAGNOSIS — S09.90XA TRAUMATIC HEAD INJURY LESS THAN 3 MONTHS AGO, INITIAL ENCOUNTER: ICD-10-CM

## 2022-06-09 PROCEDURE — G1004 CDSM NDSC: HCPCS

## 2022-06-09 PROCEDURE — 70450 CT HEAD/BRAIN W/O DYE: CPT

## 2022-06-09 RX ORDER — FLUTICASONE PROPIONATE 50 MCG
1 SPRAY, SUSPENSION (ML) NASAL DAILY
Qty: 11.1 ML | Refills: 3 | Status: SHIPPED | OUTPATIENT
Start: 2022-06-09 | End: 2022-08-03

## 2022-06-09 RX ORDER — AMOXICILLIN AND CLAVULANATE POTASSIUM 875; 125 MG/1; MG/1
1 TABLET, FILM COATED ORAL EVERY 12 HOURS SCHEDULED
Qty: 20 TABLET | Refills: 0 | Status: SHIPPED | OUTPATIENT
Start: 2022-06-09 | End: 2022-06-13

## 2022-06-09 RX ORDER — LORATADINE 10 MG/1
10 TABLET ORAL DAILY
Qty: 30 TABLET | Refills: 3 | Status: SHIPPED | OUTPATIENT
Start: 2022-06-09

## 2022-06-13 ENCOUNTER — TELEPHONE (OUTPATIENT)
Dept: OTHER | Facility: OTHER | Age: 47
End: 2022-06-13

## 2022-06-13 DIAGNOSIS — J01.00 ACUTE NON-RECURRENT MAXILLARY SINUSITIS: Primary | ICD-10-CM

## 2022-06-13 RX ORDER — AMOXICILLIN AND CLAVULANATE POTASSIUM 600; 42.9 MG/5ML; MG/5ML
600 POWDER, FOR SUSPENSION ORAL 2 TIMES DAILY
Qty: 100 ML | Refills: 0 | Status: SHIPPED | OUTPATIENT
Start: 2022-06-13 | End: 2022-06-23

## 2022-06-13 NOTE — TELEPHONE ENCOUNTER
Patient stated that he was seen last week and was precribed    amoxicillin-clavulanate (AUGMENTIN) 875-125 mg per tablet     Patient is requesting a new script be sent to [address] [place] [address],  W Chelly Eubanks as a liquid form since his is unable to take tablets

## 2022-06-15 ENCOUNTER — TELEPHONE (OUTPATIENT)
Dept: NEPHROLOGY | Facility: CLINIC | Age: 47
End: 2022-06-15

## 2022-06-20 ENCOUNTER — HOSPITAL ENCOUNTER (OUTPATIENT)
Dept: MRI IMAGING | Facility: HOSPITAL | Age: 47
Discharge: HOME | End: 2022-06-20
Payer: COMMERCIAL

## 2022-06-20 DIAGNOSIS — G44.029 CHRONIC CLUSTER HEADACHE, NOT INTRACTABLE: ICD-10-CM

## 2022-06-20 PROCEDURE — G1004 CDSM NDSC: HCPCS

## 2022-06-20 PROCEDURE — 70553 MRI BRAIN STEM W/O & W/DYE: CPT

## 2022-06-20 PROCEDURE — A9585 GADOBUTROL INJECTION: HCPCS | Performed by: NURSE PRACTITIONER

## 2022-06-20 RX ADMIN — GADOBUTROL 6 ML: 604.72 INJECTION INTRAVENOUS at 15:21

## 2022-07-05 ENCOUNTER — APPOINTMENT (OUTPATIENT)
Dept: LAB | Facility: CLINIC | Age: 47
End: 2022-07-05
Payer: COMMERCIAL

## 2022-07-05 DIAGNOSIS — R80.8 OTHER PROTEINURIA: ICD-10-CM

## 2022-07-05 DIAGNOSIS — N02.2 MEMBRANOUS NEPHROPATHY DETERMINED BY BIOPSY: ICD-10-CM

## 2022-07-05 LAB
ANION GAP SERPL CALCULATED.3IONS-SCNC: 6 MMOL/L (ref 4–13)
BUN SERPL-MCNC: 10 MG/DL (ref 5–25)
CALCIUM SERPL-MCNC: 9.2 MG/DL (ref 8.3–10.1)
CHLORIDE SERPL-SCNC: 104 MMOL/L (ref 100–108)
CO2 SERPL-SCNC: 28 MMOL/L (ref 21–32)
CREAT SERPL-MCNC: 0.92 MG/DL (ref 0.6–1.3)
CREAT UR-MCNC: 43 MG/DL
GFR SERPL CREATININE-BSD FRML MDRD: 100 ML/MIN/1.73SQ M
GLUCOSE P FAST SERPL-MCNC: 86 MG/DL (ref 65–99)
POTASSIUM SERPL-SCNC: 4 MMOL/L (ref 3.5–5.3)
PROT UR-MCNC: 135 MG/DL
PROT/CREAT UR: 3.14 MG/G{CREAT} (ref 0–0.1)
SODIUM SERPL-SCNC: 138 MMOL/L (ref 136–145)

## 2022-07-05 PROCEDURE — 84156 ASSAY OF PROTEIN URINE: CPT

## 2022-07-05 PROCEDURE — 82570 ASSAY OF URINE CREATININE: CPT

## 2022-07-05 PROCEDURE — 36415 COLL VENOUS BLD VENIPUNCTURE: CPT

## 2022-07-05 PROCEDURE — 80048 BASIC METABOLIC PNL TOTAL CA: CPT

## 2022-07-07 ENCOUNTER — TELEPHONE (OUTPATIENT)
Dept: NEPHROLOGY | Facility: CLINIC | Age: 47
End: 2022-07-07

## 2022-07-07 NOTE — TELEPHONE ENCOUNTER
----- Message from Tio Meléndze MD sent at 7/7/2022 10:49 AM EDT -----  Please call the patient let him know that his kidney function still normal by blood work the amount of protein was a little bit higher this time  If he is not having any symptoms just tell him to continue what he is doing and will repeating labs in October before his visit  Again his creatinine is totally normal   If he would begin to swell her thinks he has any other symptoms tell to give me a call  Let me know he got the message    Thank you   ----- Message -----  From: Lab, Background User  Sent: 7/5/2022  12:00 PM EDT  To: Tio Meléndez MD

## 2022-07-07 NOTE — TELEPHONE ENCOUNTER
Lm for the patient advising that renal function is stable, proteinuria is slightly more elevated  I advised the patient to please call back and verify if he is having any current symptoms or any swelling       - Mailed out lab slips for October

## 2022-07-25 DIAGNOSIS — J45.40 MODERATE PERSISTENT ASTHMA, UNSPECIFIED WHETHER COMPLICATED: ICD-10-CM

## 2022-07-26 RX ORDER — MOMETASONE FUROATE AND FORMOTEROL FUMARATE DIHYDRATE 200; 5 UG/1; UG/1
2 AEROSOL RESPIRATORY (INHALATION) 2 TIMES DAILY
Qty: 13 G | Refills: 5 | Status: SHIPPED | OUTPATIENT
Start: 2022-07-26

## 2022-08-03 DIAGNOSIS — R93.89 ABNORMAL CT SCAN: ICD-10-CM

## 2022-08-03 DIAGNOSIS — J01.00 ACUTE NON-RECURRENT MAXILLARY SINUSITIS: ICD-10-CM

## 2022-08-03 RX ORDER — FLUTICASONE PROPIONATE 50 MCG
SPRAY, SUSPENSION (ML) NASAL
Qty: 24 ML | Refills: 3 | Status: SHIPPED | OUTPATIENT
Start: 2022-08-03 | End: 2022-08-18

## 2022-08-17 DIAGNOSIS — J01.00 ACUTE NON-RECURRENT MAXILLARY SINUSITIS: ICD-10-CM

## 2022-08-17 DIAGNOSIS — R93.89 ABNORMAL CT SCAN: ICD-10-CM

## 2022-08-18 RX ORDER — FLUTICASONE PROPIONATE 50 MCG
SPRAY, SUSPENSION (ML) NASAL
Qty: 24 ML | Refills: 3 | Status: SHIPPED | OUTPATIENT
Start: 2022-08-18

## 2022-08-18 NOTE — TELEPHONE ENCOUNTER
Requested Prescriptions     Pending Prescriptions Disp Refills    fluticasone (FLONASE) 50 mcg/act nasal spray [Pharmacy Med Name: FLUTICASONE PROP 50 MCG SPRAY] 24 mL 3     Sig: SPRAY 1 SPRAY INTO EACH NOSTRIL EVERY DAY     LOV 6/3/22, F/U 9/16/22, Labs completed

## 2022-09-15 ENCOUNTER — RA CDI HCC (OUTPATIENT)
Dept: OTHER | Facility: HOSPITAL | Age: 47
End: 2022-09-15

## 2022-09-15 NOTE — PROGRESS NOTES
[facility]  coding opportunities       Chart reviewed, no opportunity found: CHART REVIEWED, NO OPPORTUNITY FOUND        Patients Insurance        Commercial Insurance: [address] [place] [address]

## 2022-09-16 ENCOUNTER — OFFICE VISIT (OUTPATIENT)
Dept: FAMILY MEDICINE CLINIC | Facility: CLINIC | Age: 47
End: 2022-09-16
Payer: COMMERCIAL

## 2022-09-16 VITALS
HEIGHT: 62 IN | HEART RATE: 80 BPM | DIASTOLIC BLOOD PRESSURE: 80 MMHG | WEIGHT: 147 LBS | SYSTOLIC BLOOD PRESSURE: 120 MMHG | BODY MASS INDEX: 27.05 KG/M2

## 2022-09-16 DIAGNOSIS — I10 ESSENTIAL HYPERTENSION: Primary | ICD-10-CM

## 2022-09-16 DIAGNOSIS — N02.2 MEMBRANOUS NEPHROPATHY DETERMINED BY BIOPSY: ICD-10-CM

## 2022-09-16 DIAGNOSIS — R60.0 FACIAL EDEMA: ICD-10-CM

## 2022-09-16 DIAGNOSIS — H66.002 ACUTE SUPPURATIVE OTITIS MEDIA OF LEFT EAR WITHOUT SPONTANEOUS RUPTURE OF TYMPANIC MEMBRANE, RECURRENCE NOT SPECIFIED: ICD-10-CM

## 2022-09-16 DIAGNOSIS — Z23 NEED FOR PNEUMOCOCCAL VACCINATION: ICD-10-CM

## 2022-09-16 PROBLEM — S29.011A CHEST WALL MUSCLE STRAIN: Status: RESOLVED | Noted: 2022-06-03 | Resolved: 2022-09-16

## 2022-09-16 PROCEDURE — 3725F SCREEN DEPRESSION PERFORMED: CPT

## 2022-09-16 PROCEDURE — 90677 PCV20 VACCINE IM: CPT

## 2022-09-16 PROCEDURE — 99214 OFFICE O/P EST MOD 30 MIN: CPT

## 2022-09-16 PROCEDURE — 90471 IMMUNIZATION ADMIN: CPT

## 2022-09-16 RX ORDER — AMOXICILLIN AND CLAVULANATE POTASSIUM 400; 57 MG/5ML; MG/5ML
10 POWDER, FOR SUSPENSION ORAL 2 TIMES DAILY
Qty: 140 ML | Refills: 0 | Status: SHIPPED | OUTPATIENT
Start: 2022-09-16 | End: 2022-09-23

## 2022-09-16 RX ORDER — AMOXICILLIN AND CLAVULANATE POTASSIUM 875; 125 MG/1; MG/1
1 TABLET, FILM COATED ORAL EVERY 12 HOURS SCHEDULED
Qty: 14 TABLET | Refills: 0 | Status: SHIPPED | OUTPATIENT
Start: 2022-09-16 | End: 2022-09-16

## 2022-09-16 RX ORDER — AMLODIPINE BESYLATE 5 MG/1
5 TABLET ORAL DAILY
Qty: 30 TABLET | Refills: 5 | Status: SHIPPED | OUTPATIENT
Start: 2022-09-16 | End: 2022-10-10

## 2022-09-16 NOTE — PROGRESS NOTES
Assessment and Plan:    Problem List Items Addressed This Visit        Cardiovascular and Mediastinum    Essential hypertension - Primary     BP stable         Relevant Medications    amLODIPine (NORVASC) 5 mg tablet       Genitourinary    Membranous nephropathy determined by biopsy    Relevant Orders    Protein / creatinine ratio, urine       Other    Facial edema    Relevant Orders    C-reactive protein    Amylase    Sedimentation rate, automated    CBC and differential    C3, C4, Complement CH50    Protein / creatinine ratio, urine    Food Allergy Profile      Other Visit Diagnoses     Acute suppurative otitis media of left ear without spontaneous rupture of tympanic membrane, recurrence not specified        Relevant Medications    amoxicillin-clavulanate (Augmentin) 875-125 mg per tablet    Need for pneumococcal vaccination        Relevant Orders    Pneumococcal Conjugate Vaccine 20-valent (Pcv20)                 Diagnoses and all orders for this visit:    Essential hypertension  -     amLODIPine (NORVASC) 5 mg tablet; Take 1 tablet (5 mg total) by mouth daily    Facial edema  -     C-reactive protein; Future  -     Amylase; Future  -     Sedimentation rate, automated; Future  -     CBC and differential; Future  -     C3, C4, Complement CH50; Future  -     Protein / creatinine ratio, urine  -     Food Allergy Profile; Future    Membranous nephropathy determined by biopsy  -     Protein / creatinine ratio, urine    Acute suppurative otitis media of left ear without spontaneous rupture of tympanic membrane, recurrence not specified  -     amoxicillin-clavulanate (Augmentin) 875-125 mg per tablet; Take 1 tablet by mouth every 12 (twelve) hours for 7 days    Need for pneumococcal vaccination  -     Pneumococcal Conjugate Vaccine 20-valent (Pcv20)            Subjective:      Patient ID: Jenna Gomez is a 41 y o  male      CC:    Chief Complaint   Patient presents with    Follow-up     Pt is present today for 6 month chronic condition follow up       HPI:    Here for follow up  For BP, saw kidney  Doc over summer and Losartan increased to 100, feels glands are swollen and eyes/face  Swollen, did have  Cough on Lisinopril, had stopped cyclospone  When urine protein went under 300, BP yesterday  120's/70's      The following portions of the patient's history were reviewed and updated as appropriate: allergies, current medications, past family history, past medical history, past social history, past surgical history and problem list         Review of Systems   Constitutional: Negative for activity change, appetite change and fatigue  HENT: Positive for facial swelling  Ear itching l   Respiratory: Negative for shortness of breath  Cardiovascular: Negative for chest pain  Neurological: Negative for dizziness and headaches  Hematological: Positive for adenopathy  Data to review:       Objective:    Vitals:    09/16/22 0905 09/16/22 0938   BP: 146/100 120/80   BP Location: Left arm Left arm   Patient Position: Sitting Sitting   Cuff Size: Standard Standard   Pulse: 80    Weight: 66 7 kg (147 lb)    Height: 5' 2" (1 575 m)         Physical Exam  Vitals reviewed  Constitutional:       Appearance: Normal appearance  HENT:      Head:      Comments: Facial swelling     Right Ear: Tympanic membrane normal       Left Ear: Tympanic membrane is erythematous  Nose: No congestion or rhinorrhea  Mouth/Throat:      Pharynx: No oropharyngeal exudate or posterior oropharyngeal erythema  Neck:      Comments: l sided  adenopathy  Cardiovascular:      Rate and Rhythm: Normal rate and regular rhythm  Pulses: Normal pulses  Heart sounds: Normal heart sounds  Pulmonary:      Effort: Pulmonary effort is normal       Breath sounds: Normal breath sounds  Musculoskeletal:      Right lower leg: No edema  Left lower leg: No edema  Lymphadenopathy:      Cervical: Cervical adenopathy present  Neurological:      Mental Status: He is alert  13-Sep-2018 10:37

## 2022-10-03 ENCOUNTER — TELEPHONE (OUTPATIENT)
Dept: NEPHROLOGY | Facility: CLINIC | Age: 47
End: 2022-10-03

## 2022-10-05 ENCOUNTER — TELEPHONE (OUTPATIENT)
Dept: FAMILY MEDICINE CLINIC | Facility: CLINIC | Age: 47
End: 2022-10-05

## 2022-10-05 ENCOUNTER — APPOINTMENT (OUTPATIENT)
Dept: LAB | Facility: CLINIC | Age: 47
End: 2022-10-05
Payer: COMMERCIAL

## 2022-10-05 DIAGNOSIS — N02.2 MEMBRANOUS NEPHROPATHY DETERMINED BY BIOPSY: ICD-10-CM

## 2022-10-05 DIAGNOSIS — R80.8 OTHER PROTEINURIA: ICD-10-CM

## 2022-10-05 DIAGNOSIS — R60.0 FACIAL EDEMA: ICD-10-CM

## 2022-10-05 LAB
ALBUMIN SERPL BCP-MCNC: 2.6 G/DL (ref 3.5–5)
ALP SERPL-CCNC: 45 U/L (ref 46–116)
ALT SERPL W P-5'-P-CCNC: 53 U/L (ref 12–78)
AMYLASE SERPL-CCNC: 101 IU/L (ref 25–115)
ANION GAP SERPL CALCULATED.3IONS-SCNC: 6 MMOL/L (ref 4–13)
AST SERPL W P-5'-P-CCNC: 36 U/L (ref 5–45)
BASOPHILS # BLD AUTO: 0.02 THOUSANDS/ΜL (ref 0–0.1)
BASOPHILS NFR BLD AUTO: 1 % (ref 0–1)
BILIRUB SERPL-MCNC: 0.42 MG/DL (ref 0.2–1)
BUN SERPL-MCNC: 14 MG/DL (ref 5–25)
C3 SERPL-MCNC: 126 MG/DL (ref 90–180)
C4 SERPL-MCNC: 24 MG/DL (ref 10–40)
CALCIUM ALBUM COR SERPL-MCNC: 9.9 MG/DL (ref 8.3–10.1)
CALCIUM SERPL-MCNC: 8.8 MG/DL (ref 8.3–10.1)
CHLORIDE SERPL-SCNC: 109 MMOL/L (ref 96–108)
CO2 SERPL-SCNC: 25 MMOL/L (ref 21–32)
CREAT SERPL-MCNC: 1 MG/DL (ref 0.6–1.3)
CREAT UR-MCNC: 163 MG/DL
CRP SERPL QL: <3 MG/L
EOSINOPHIL # BLD AUTO: 0.11 THOUSAND/ΜL (ref 0–0.61)
EOSINOPHIL NFR BLD AUTO: 3 % (ref 0–6)
ERYTHROCYTE [DISTWIDTH] IN BLOOD BY AUTOMATED COUNT: 12.2 % (ref 11.6–15.1)
ERYTHROCYTE [SEDIMENTATION RATE] IN BLOOD: 17 MM/HOUR (ref 0–14)
GFR SERPL CREATININE-BSD FRML MDRD: 90 ML/MIN/1.73SQ M
GLUCOSE P FAST SERPL-MCNC: 85 MG/DL (ref 65–99)
HCT VFR BLD AUTO: 43.5 % (ref 36.5–49.3)
HGB BLD-MCNC: 14.3 G/DL (ref 12–17)
IMM GRANULOCYTES # BLD AUTO: 0 THOUSAND/UL (ref 0–0.2)
IMM GRANULOCYTES NFR BLD AUTO: 0 % (ref 0–2)
LYMPHOCYTES # BLD AUTO: 1.81 THOUSANDS/ΜL (ref 0.6–4.47)
LYMPHOCYTES NFR BLD AUTO: 44 % (ref 14–44)
MCH RBC QN AUTO: 28.9 PG (ref 26.8–34.3)
MCHC RBC AUTO-ENTMCNC: 32.9 G/DL (ref 31.4–37.4)
MCV RBC AUTO: 88 FL (ref 82–98)
MONOCYTES # BLD AUTO: 0.29 THOUSAND/ΜL (ref 0.17–1.22)
MONOCYTES NFR BLD AUTO: 7 % (ref 4–12)
NEUTROPHILS # BLD AUTO: 1.87 THOUSANDS/ΜL (ref 1.85–7.62)
NEUTS SEG NFR BLD AUTO: 45 % (ref 43–75)
NRBC BLD AUTO-RTO: 0 /100 WBCS
PLATELET # BLD AUTO: 194 THOUSANDS/UL (ref 149–390)
PMV BLD AUTO: 11.4 FL (ref 8.9–12.7)
POTASSIUM SERPL-SCNC: 3.7 MMOL/L (ref 3.5–5.3)
PROT SERPL-MCNC: 5.9 G/DL (ref 6.4–8.4)
PROT UR-MCNC: 464 MG/DL
PROT/CREAT UR: 2.85 MG/G{CREAT} (ref 0–0.1)
RBC # BLD AUTO: 4.94 MILLION/UL (ref 3.88–5.62)
SODIUM SERPL-SCNC: 140 MMOL/L (ref 135–147)
WBC # BLD AUTO: 4.1 THOUSAND/UL (ref 4.31–10.16)

## 2022-10-05 PROCEDURE — 83516 IMMUNOASSAY NONANTIBODY: CPT

## 2022-10-05 PROCEDURE — 82570 ASSAY OF URINE CREATININE: CPT | Performed by: FAMILY MEDICINE

## 2022-10-05 PROCEDURE — 86162 COMPLEMENT TOTAL (CH50): CPT

## 2022-10-05 PROCEDURE — 86140 C-REACTIVE PROTEIN: CPT

## 2022-10-05 PROCEDURE — 86160 COMPLEMENT ANTIGEN: CPT

## 2022-10-05 PROCEDURE — 80053 COMPREHEN METABOLIC PANEL: CPT

## 2022-10-05 PROCEDURE — 84156 ASSAY OF PROTEIN URINE: CPT | Performed by: FAMILY MEDICINE

## 2022-10-05 PROCEDURE — 85652 RBC SED RATE AUTOMATED: CPT

## 2022-10-05 PROCEDURE — 86008 ALLG SPEC IGE RECOMB EA: CPT

## 2022-10-05 PROCEDURE — 85025 COMPLETE CBC W/AUTO DIFF WBC: CPT

## 2022-10-05 PROCEDURE — 36415 COLL VENOUS BLD VENIPUNCTURE: CPT

## 2022-10-05 PROCEDURE — 82150 ASSAY OF AMYLASE: CPT

## 2022-10-05 PROCEDURE — 86003 ALLG SPEC IGE CRUDE XTRC EA: CPT

## 2022-10-05 PROCEDURE — 82785 ASSAY OF IGE: CPT

## 2022-10-06 LAB
A-LACTALB IGE QN: 0.3 KAU/I
ALMOND IGE QN: 1.04 KUA/I
ARA H6 PEANUT: <0.1 KUA/I
B-LACTOGLOB IGE QN: 0.27 KAU/I
CASEIN IGE QN: 0.13 KAU/I
CASHEW NUT IGE QN: 0.16 KUA/I
CH50 SERPL-ACNC: >60 U/ML
CODFISH IGE QN: 0.65 KUA/I
EGG WHITE IGE QN: 0.55 KUA/I
GLUTEN IGE QN: 0.26 KUA/I
HAZELNUT IGE QN: 16.7 KUA/L
MILK IGE QN: 0.38 KUA/I
OVALB IGE QN: 0.5 KAU/I
OVOMUCOID IGE QN: <0.1 KAU/I
PEANUT (RARA H) 1 IGE QN: <0.1 KUA/I
PEANUT (RARA H) 2 IGE QN: <0.1 KUA/I
PEANUT (RARA H) 3 IGE QN: 0.13 KUA/I
PEANUT (RARA H) 8 IGE QN: 10.6 KUA/I
PEANUT (RARA H) 9 IGE QN: <0.1 KUA/I
PEANUT IGE QN: 9.8 KUA/I
SALMON IGE QN: 0.26 KUA/I
SCALLOP IGE QN: 17.1 KUA/L
SESAME SEED IGE QN: 3.12 KUA/I
SHRIMP IGE QN: 65.8 KUA/L
SOYBEAN IGE QN: 0.95 KUA/I
TOTAL IGE SMQN RAST: 975 KU/L (ref 0–113)
TUNA IGE QN: 1.56 KUA/I
WALNUT IGE QN: 0.7 KUA/I
WHEAT IGE QN: 1.64 KUA/I

## 2022-10-07 DIAGNOSIS — Z91.018 MULTIPLE FOOD ALLERGIES: Primary | ICD-10-CM

## 2022-10-09 DIAGNOSIS — I10 ESSENTIAL HYPERTENSION: ICD-10-CM

## 2022-10-10 RX ORDER — AMLODIPINE BESYLATE 5 MG/1
5 TABLET ORAL DAILY
Qty: 90 TABLET | Refills: 2 | Status: SHIPPED | OUTPATIENT
Start: 2022-10-10

## 2022-10-10 NOTE — TELEPHONE ENCOUNTER
Requested Prescriptions     Pending Prescriptions Disp Refills   • amLODIPine (NORVASC) 5 mg tablet [Pharmacy Med Name: AMLODIPINE BESYLATE 5 MG TAB] 90 tablet 2     Sig: TAKE 1 TABLET (5 MG TOTAL) BY MOUTH DAILY       LOV 9/16/22, F/U 1/25/23, Labs active

## 2022-10-12 ENCOUNTER — OFFICE VISIT (OUTPATIENT)
Dept: NEPHROLOGY | Facility: CLINIC | Age: 47
End: 2022-10-12
Payer: COMMERCIAL

## 2022-10-12 VITALS
HEIGHT: 62 IN | BODY MASS INDEX: 27.75 KG/M2 | HEART RATE: 80 BPM | DIASTOLIC BLOOD PRESSURE: 78 MMHG | WEIGHT: 150.8 LBS | SYSTOLIC BLOOD PRESSURE: 128 MMHG

## 2022-10-12 DIAGNOSIS — N02.2 MEMBRANOUS NEPHROPATHY DETERMINED BY BIOPSY: Primary | ICD-10-CM

## 2022-10-12 DIAGNOSIS — R80.8 OTHER PROTEINURIA: ICD-10-CM

## 2022-10-12 PROCEDURE — 99214 OFFICE O/P EST MOD 30 MIN: CPT | Performed by: INTERNAL MEDICINE

## 2022-10-12 NOTE — PATIENT INSTRUCTIONS
You are here for follow-up as we discussed your blood pressure is much better on amlodipine so that is great and her said her feeling better overall  The losartan was stopped out of concern for angioedema could your family doctor noticed some swelling in her throat and eyes  You are also undergoing allergy testing  With respect to your kidney your creatinine is 1 still normal the amount of protein in the urine is roughly stable around 2 5 g  As we discussed the losartan is a medicine that can help lower protein but we can not use it cause this possible side effect  There are other medications such as cyclosporin which he had been on in the past the proteinuria would increase or stronger her intravenous medications but for now will continue to monitor  You know the signs that if he started to swell significantly her to call me and will just monitor it

## 2022-10-12 NOTE — LETTER
October 12, 2022     Kailey Ramirez, [address]  [address]  [id number]    Patient: Toya Palacios   YOB: 1975   Date of Visit: 10/12/2022       Dear Dr Berlin Mascorro:    Thank you for referring Irma Veliz to me for evaluation  Below are my notes for this consultation  If you have questions, please do not hesitate to call me  I look forward to following your patient along with you  Sincerely,        George Riddle MD        CC: No Recipients  George Riddle MD  10/12/2022  8:26 AM  Sign when Signing Visit  NEPHROLOGY PROGRESS NOTE    Toya Palacios 41 y o  male MRN: [id number]  Unit/Bed#:  Encounter: [id number]  Reason for Consult:  Membranous GN    Patient is here for routine follow-up he looks great  You had contacted me regarding potential angioedema and swelling stop losartan and initiated amlodipine  He says his blood pressures been running great and he actually feels better on amlodipine  He feels the swelling is gone down as well  Otherwise no acute changes or complaints  ASSESSMENT/PLAN:    1  Renal    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 and I have been following for years now  In the past we treated with ARB therapy and cyclosporin to lower proteinuria had been successful  Unfortunately he is not a patient has gone into complete remission but it is partial as he is low levels of proteinuria around 2 5 g  Albumin levels are low as well  He does not have swelling  Off the ARB proteinuria has not changed  Anti phospholipase receptor antibodies are borderline  At this point were continuing to observe and monitor and I will follow this in between visits we still have the ability to go back to cyclosporin if proteinuria would increase and potentially Rituxan therapy if things would get worse with have your proteinuria  2s relatively decent prognosis of proteinuria remains under 4 g      Continue current medications  BMP in urine protein estimation in 3 months and then prior to visit with phospho lipase receptor antibodies in 6 months  He was told to call if there is any problems or concerns before next visit  SUBJECTIVE:  Review of Systems   Constitutional: Negative for chills, fever, malaise/fatigue and night sweats  HENT: Negative  Eyes: Negative  Cardiovascular: Negative  Negative for chest pain, dyspnea on exertion, leg swelling and orthopnea  Respiratory: Negative  Negative for cough, shortness of breath, sputum production and wheezing  Gastrointestinal: Negative  Negative for abdominal pain, diarrhea, nausea and vomiting  Genitourinary: Negative for dysuria, flank pain, hematuria and incomplete emptying  Neurological: Negative for dizziness, focal weakness, headaches and weakness  Psychiatric/Behavioral: Negative for altered mental status, depression, hallucinations and hypervigilance  OBJECTIVE:  Current Weight:    Ever@[employer]:     There were no vitals taken for this visit  , There is no height or weight on file to calculate BMI  [unfilled]    Physical Exam: There were no vitals taken for this visit  Physical Exam  Constitutional:       General: He is not in acute distress  Appearance: Normal appearance  He is not ill-appearing, toxic-appearing or diaphoretic  HENT:      Head: Normocephalic and atraumatic  Nose: Nose normal       Mouth/Throat:      Mouth: Mucous membranes are moist    Eyes:      General: No scleral icterus  Extraocular Movements: Extraocular movements intact  Cardiovascular:      Rate and Rhythm: Normal rate and regular rhythm  Heart sounds: No friction rub  No gallop  Comments: No edema  Pulmonary:      Effort: Pulmonary effort is normal  No respiratory distress  Breath sounds: No wheezing, rhonchi or rales  Abdominal:      General: Bowel sounds are normal  There is no distension  Palpations: Abdomen is soft  Tenderness:  There is no abdominal tenderness  There is no rebound  Musculoskeletal:      Cervical back: Normal range of motion and neck supple  Neurological:      General: No focal deficit present  Mental Status: He is alert and oriented to person, place, and time  Mental status is at baseline  Psychiatric:         Mood and Affect: Mood normal          Behavior: Behavior normal          Thought Content: Thought content normal          Judgment: Judgment normal          Medications:    Current Outpatient Medications:   •  albuterol (2 5 mg/3 mL) 0 083 % nebulizer solution, Take 3 mL (2 5 mg total) by nebulization every 4 (four) hours as needed for wheezing or shortness of breath, Disp: 100 mL, Rfl: 5  •  albuterol (PROVENTIL HFA,VENTOLIN HFA) 90 mcg/act inhaler, TAKE 2 PUFFS BY MOUTH EVERY 4 HOURS AS NEEDED FOR WHEEZE, Disp: 6 7 g, Rfl: 5  •  amLODIPine (NORVASC) 5 mg tablet, TAKE 1 TABLET (5 MG TOTAL) BY MOUTH DAILY  , Disp: 90 tablet, Rfl: 2  •  Dulera 200-5 MCG/ACT inhaler, INHALE 2 PUFFS 2 (TWO) TIMES A DAY RINSE MOUTH AFTER USE , Disp: 13 g, Rfl: 5  •  fluticasone (FLONASE) 50 mcg/act nasal spray, SPRAY 1 SPRAY INTO EACH NOSTRIL EVERY DAY, Disp: 24 mL, Rfl: 3  •  loratadine (CLARITIN) 10 mg tablet, Take 1 tablet (10 mg total) by mouth daily, Disp: 30 tablet, Rfl: 3  •  LORazepam (Ativan) 0 5 mg tablet, 1/2-1 qhs prn, Disp: 20 tablet, Rfl: 1  •  Multiple Vitamin (multivitamin) capsule, Take 1 capsule by mouth daily, Disp: , Rfl:     Laboratory Results:  Lab Results   Component Value Date    WBC 4 10 (L) 10/05/2022    HGB 14 3 10/05/2022    HCT 43 5 10/05/2022    MCV 88 10/05/2022     10/05/2022     Lab Results   Component Value Date    SODIUM 140 10/05/2022    K 3 7 10/05/2022     (H) 10/05/2022    CO2 25 10/05/2022    BUN 14 10/05/2022    CREATININE 1 00 10/05/2022    GLUC 87 05/31/2022    CALCIUM 8 8 10/05/2022     Lab Results   Component Value Date    CALCIUM 8 8 10/05/2022    PHOS 4 7 (H) 05/31/2022     No results found for:  LABPROT

## 2022-10-12 NOTE — PROGRESS NOTES
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 and I have been following for years now  In the past we treated with ARB therapy and cyclosporin to lower proteinuria had been successful  Unfortunately he is not a patient has gone into complete remission but it is partial as he is low levels of proteinuria around 2 5 g  Albumin levels are low as well  He does not have swelling  Off the ARB proteinuria has not changed  Anti phospholipase receptor antibodies are borderline  At this point were continuing to observe and monitor and I will follow this in between visits we still have the ability to go back to cyclosporin if proteinuria would increase and potentially Rituxan therapy if things would get worse with have your proteinuria  2s relatively decent prognosis of proteinuria remains under 4 g  Continue current medications  BMP in urine protein estimation in 3 months and then prior to visit with phospho lipase receptor antibodies in 6 months  He was told to call if there is any problems or concerns before next visit  SUBJECTIVE:  Review of Systems   Constitutional: Negative for chills, fever, malaise/fatigue and night sweats  HENT: Negative  Eyes: Negative  Cardiovascular: Negative  Negative for chest pain, dyspnea on exertion, leg swelling and orthopnea  Respiratory: Negative  Negative for cough, shortness of breath, sputum production and wheezing  Gastrointestinal: Negative  Negative for abdominal pain, diarrhea, nausea and vomiting  Genitourinary: Negative for dysuria, flank pain, hematuria and incomplete emptying  Neurological: Negative for dizziness, focal weakness, headaches and weakness  Psychiatric/Behavioral: Negative for altered mental status, depression, hallucinations and hypervigilance  OBJECTIVE:  Current Weight:    [email or web address] com:     There were no vitals taken for this visit  , There is no height or weight on file to calculate BMI  [unfilled]    Physical Exam: There were no vitals taken for this visit  Physical Exam  Constitutional:       General: He is not in acute distress  Appearance: Normal appearance  He is not ill-appearing, toxic-appearing or diaphoretic  HENT:      Head: Normocephalic and atraumatic  Nose: Nose normal       Mouth/Throat:      Mouth: Mucous membranes are moist    Eyes:      General: No scleral icterus  Extraocular Movements: Extraocular movements intact  Cardiovascular:      Rate and Rhythm: Normal rate and regular rhythm  Heart sounds: No friction rub  No gallop  Comments: No edema  Pulmonary:      Effort: Pulmonary effort is normal  No respiratory distress  Breath sounds: No wheezing, rhonchi or rales  Abdominal:      General: Bowel sounds are normal  There is no distension  Palpations: Abdomen is soft  Tenderness: There is no abdominal tenderness  There is no rebound  Musculoskeletal:      Cervical back: Normal range of motion and neck supple  Neurological:      General: No focal deficit present  Mental Status: He is alert and oriented to person, place, and time  Mental status is at baseline  Psychiatric:         Mood and Affect: Mood normal          Behavior: Behavior normal          Thought Content:  Thought content normal          Judgment: Judgment normal          Medications:    Current Outpatient Medications:   •  albuterol (2 5 mg/3 mL) 0 083 % nebulizer solution, Take 3 mL (2 5 mg total) by nebulization every 4 (four) hours as needed for wheezing or shortness of breath, Disp: 100 mL, Rfl: 5  •  albuterol (PROVENTIL HFA,VENTOLIN HFA) 90 mcg/act inhaler, TAKE 2 PUFFS BY MOUTH EVERY 4 HOURS AS NEEDED FOR WHEEZE, Disp: 6 7 g, Rfl: 5  •  amLODIPine (NORVASC) 5 mg tablet, TAKE 1 TABLET (5 MG TOTAL) BY MOUTH DAILY  , Disp: 90 tablet, Rfl: 2  •  Dulera 200-5 MCG/ACT inhaler, INHALE 2 PUFFS 2 (TWO) TIMES A DAY RINSE MOUTH AFTER USE , Disp: 13 g, Rfl: 5  •  fluticasone (FLONASE) 50 mcg/act nasal spray, SPRAY 1 SPRAY INTO EACH NOSTRIL EVERY DAY, Disp: 24 mL, Rfl: 3  •  loratadine (CLARITIN) 10 mg tablet, Take 1 tablet (10 mg total) by mouth daily, Disp: 30 tablet, Rfl: 3  •  LORazepam (Ativan) 0 5 mg tablet, 1/2-1 qhs prn, Disp: 20 tablet, Rfl: 1  •  Multiple Vitamin (multivitamin) capsule, Take 1 capsule by mouth daily, Disp: , Rfl:     Laboratory Results:  Lab Results   Component Value Date    WBC 4 10 (L) 10/05/2022    HGB 14 3 10/05/2022    HCT 43 5 10/05/2022    MCV 88 10/05/2022     10/05/2022     Lab Results   Component Value Date    SODIUM 140 10/05/2022    K 3 7 10/05/2022     (H) 10/05/2022    CO2 25 10/05/2022    BUN 14 10/05/2022    CREATININE 1 00 10/05/2022    GLUC 87 05/31/2022    CALCIUM 8 8 10/05/2022     Lab Results   Component Value Date    CALCIUM 8 8 10/05/2022    PHOS 4 7 (H) 05/31/2022     No results found for: LABPROT

## 2022-10-26 ENCOUNTER — VBI (OUTPATIENT)
Dept: ADMINISTRATIVE | Facility: OTHER | Age: 47
End: 2022-10-26

## 2022-12-12 ENCOUNTER — TELEMEDICINE (OUTPATIENT)
Dept: FAMILY MEDICINE CLINIC | Facility: CLINIC | Age: 47
End: 2022-12-12

## 2022-12-12 VITALS
DIASTOLIC BLOOD PRESSURE: 73 MMHG | BODY MASS INDEX: 27.44 KG/M2 | HEART RATE: 72 BPM | SYSTOLIC BLOOD PRESSURE: 130 MMHG | OXYGEN SATURATION: 98 % | RESPIRATION RATE: 18 BRPM | WEIGHT: 150 LBS | TEMPERATURE: 99 F

## 2022-12-12 DIAGNOSIS — U07.1 COVID-19: Primary | ICD-10-CM

## 2022-12-12 RX ORDER — NIRMATRELVIR AND RITONAVIR 300-100 MG
3 KIT ORAL 2 TIMES DAILY
Qty: 30 TABLET | Refills: 0 | Status: SHIPPED | OUTPATIENT
Start: 2022-12-12 | End: 2022-12-17

## 2022-12-12 RX ORDER — EPINEPHRINE 0.3 MG/.3ML
INJECTION SUBCUTANEOUS
COMMUNITY
Start: 2022-11-30

## 2022-12-12 NOTE — PROGRESS NOTES
COVID-19 Outpatient Progress Note    Assessment/Plan:    Problem List Items Addressed This Visit        Other    COVID-19 - Primary    Relevant Medications    nirmatrelvir & ritonavir (Paxlovid, 300/100,) tablet therapy pack      Disposition:     Patient has asymptomatic or mild COVID-19 infection  Based off CDC guidelines, they were recommended to isolate for 5 days  If they are asymptomatic or symptoms are improving with no fevers in the past 24 hours, isolation may be ended followed by 5 days of wearing a mask when around othes to minimize risk of infecting others  If still have a fever or other symptoms have not improved, continue to isolate until they improve  Regardless of when they end isolation, avoid being around people who are more likely to get very sick from COVID-19 until at least day 11  Discussed symptom directed medication options with patient  Discussed vitamin D, vitamin C, and/or zinc supplementation with patient  Patient meets criteria for PAXLOVID and they have been counseled appropriately according to EUA documentation released by the FDA  After discussion, patient agrees to treatment  Emir Lorenzana is an investigational medicine used to treat mild-to-moderate COVID-19 in adults and children (15years of age and older weighing at least 80 pounds (40 kg)) with positive results of direct SARS-CoV-2 viral testing, and who are at high risk for progression to severe COVID-19, including hospitalization or death  PAXLOVID is investigational because it is still being studied  There is limited information about the safety and effectiveness of using PAXLOVID to treat people with mild-to-moderate COVID-19      The FDA has authorized the emergency use of PAXLOVID for the treatment of mild-tomoderate COVID-19 in adults and children (15years of age and older weighing at least 80 pounds (40 kg)) with a positive test for the virus that causes COVID-19, and who are at high risk for progression to severe COVID-19, including hospitalization or death, under an EUA  What should I tell my healthcare provider before I take PAXLOVID? Tell your healthcare provider if you:  - Have any allergies  - Have liver or kidney disease  - Are pregnant or plan to become pregnant  - Are breastfeeding a child  - Have any serious illnesses    Tell your healthcare provider about all the medicines you take, including prescription and over-the-counter medicines, vitamins, and herbal supplements  Some medicines may interact with PAXLOVID and may cause serious side effects  Keep a list of your medicines to show your healthcare provider and pharmacist when you get a new medicine  You can ask your healthcare provider or pharmacist for a list of medicines that interact with PAXLOVID  Do not start taking a new medicine without telling your healthcare provider  Your healthcare provider can tell you if it is safe to take PAXLOVID with other medicines  Tell your healthcare provider if you are taking combined hormonal contraceptive  PAXLOVID may affect how your birth control pills work  Females who are able to become pregnant should use another effective alternative form of contraception or an additional barrier method of contraception  Talk to your healthcare provider if you have any questions about contraceptive methods that might be right for you  How do I take PAXLOVID? PAXLOVID consists of 2 medicines: nirmatrelvir and ritonavir  - Take 2 pink tablets of nirmatrelvir with 1 white tablet of ritonavir by mouth 2 times each day (in the morning and in the evening) for 5 days  For each dose, take all 3 tablets at the same time  - If you have kidney disease, talk to your healthcare provider  You may need a different dose  - Swallow the tablets whole  Do not chew, break, or crush the tablets  - Take PAXLOVID with or without food    - Do not stop taking PAXLOVID without talking to your healthcare provider, even if you feel better  - If you miss a dose of PAXLOVID within 8 hours of the time it is usually taken, take it as soon as you remember  If you miss a dose by more than 8 hours, skip the missed dose and take the next dose at your regular time  Do not take 2 doses of PAXLOVID at the same time  - If you take too much PAXLOVID, call your healthcare provider or go to the nearest hospital emergency room right away  - If you are taking a ritonavir- or cobicistat-containing medicine to treat hepatitis C or Human Immunodeficiency Virus (HIV), you should continue to take your medicine as prescribed by your healthcare provider   - Talk to your healthcare provider if you do not feel better or if you feel worse after 5 days  Who should generally not take PAXLOVID? Do not take PAXLOVID if:  You are allergic to nirmatrelvir, ritonavir, or any of the ingredients in PAXLOVID  You are taking any of the following medicines:  - Alfuzosin  - Pethidine, piroxicam, propoxyphene  - Ranolazine  - Amiodarone, dronedarone, flecainide, propafenone, quinidine  - Colchicine  - Lurasidone, pimozide, clozapine  - Dihydroergotamine, ergotamine, methylergonovine  - Lovastatin, simvastatin  - Sildenafil (Revatio®) for pulmonary arterial hypertension (PAH)  - Triazolam, oral midazolam  - Apalutamide  - Carbamazepine, phenobarbital, phenytoin  - Rifampin  - St  Aman’s Wort (hypericum perforatum)    What are the important possible side effects of PAXLOVID? Possible side effects of PAXLOVID are:  - Liver Problems  Tell your healthcare provider right away if you have any of these signs and symptoms of liver problems: loss of appetite, yellowing of your skin and the whites of eyes (jaundice), dark-colored urine, pale colored stools and itchy skin, stomach area (abdominal) pain  - Resistance to HIV Medicines  If you have untreated HIV infection, PAXLOVID may lead to some HIV medicines not working as well in the future    - Other possible side effects include: altered sense of taste, diarrhea, high blood pressure, or muscle aches    These are not all the possible side effects of PAXLOVID  Not many people have taken PAXLOVID  Serious and unexpected side effects may happen  Aj Cordero is still being studied, so it is possible that all of the risks are not known at this time  What other treatment choices are there? Like Everlean Holes may allow for the emergency use of other medicines to treat people with COVID-19  Go to https://Qwikwire/ for information on the emergency use of other medicines that are authorized by FDA to treat people with COVID-19  Your healthcare provider may talk with you about clinical trials for which you may be eligible  It is your choice to be treated or not to be treated with PAXLOVID  Should you decide not to receive it or for your child not to receive it, it will not change your standard medical care  What if I am pregnant or breastfeeding? There is no experience treating pregnant women or breastfeeding mothers with PAXLOVID  For a mother and unborn baby, the benefit of taking PAXLOVID may be greater than the risk from the treatment  If you are pregnant, discuss your options and specific situation with your healthcare provider  It is recommended that you use effective barrier contraception or do not have sexual activity while taking PAXLOVID  If you are breastfeeding, discuss your options and specific situation with your healthcare provider  How do I report side effects with PAXLOVID? Contact your healthcare provider if you have any side effects that bother you or do not go away  Report side effects to FDA MedWatch at www fda gov/medwatch or call 5-755-TIV2630 or you can report side effects to [facility]  at the contact information provided below      Website Fax number Telephone number www freshbag [phone]     How should I store Laveda Hait? Store PAXLOVID tablets at room temperature between 68°F to 77°F (20°C to 25°C)  Full fact sheet for patients, parents, and caregivers can be found at: [email or web address]    I have spent 5 minutes directly with the patient  A little  Reluctant to take meds, not  Good  With pills, advised important  To take meds  Due to asthma, pt  Will try      Encounter provider: Deedee Anaya MD     Provider located at: [address] [address] [phone]     Recent Visits  No visits were found meeting these conditions  Showing recent visits within past 7 days and meeting all other requirements  Today's Visits  Date Type Provider Dept   12/12/22 Telemedicine Deedee Anaya MD [facility]   Showing today's visits and meeting all other requirements  Future Appointments  No visits were found meeting these conditions  Showing future appointments within next 150 days and meeting all other requirements     This virtual check-in was done via [address] and patient was informed that this is a secure, HIPAA-compliant platform  He agrees to proceed  Patient agrees to participate in a virtual check in via telephone or video visit instead of presenting to the office to address urgent/immediate medical needs  Patient is aware this is a billable service  He acknowledged consent and understanding of privacy and security of the video platform  The patient has agreed to participate and understands they can discontinue the visit at any time  After connecting through [facility], the patient was identified by name and date of birth  Bozena Lopez was informed that this was a telemedicine visit and that the exam was being conducted confidentially over secure lines  My office door was closed  No one else was in the room   Bozena Lopez acknowledged consent and understanding of privacy and security of the telemedicine visit  I informed the patient that I have reviewed his record in Epic and presented the opportunity for him to ask any questions regarding the visit today  The patient agreed to participate  Verification of patient location:  Patient is located in the following state in which I hold an active license: [place]    Subjective:   Isela Carpenter is a 41 y o  male who has been screened for COVID-19  Symptom change since last report: unchanged  Patient's symptoms include fever, chills, fatigue, nasal congestion, rhinorrhea, cough, chest tightness, myalgias and headache  Patient denies anosmia, loss of taste, nausea, vomiting and diarrhea  - Date of symptom onset: 12/9/2022  - Date of positive COVID-19 test: 12/9/2022  Type of test: Home antigen  Patient with typical symptoms of COVID-19 and they attest that they were positive on home rapid antigen testing  Image of positive result is not able to be uploaded into their chart  COVID-19 vaccination status: Fully vaccinated with booster    Lane Susan has been staying home and has isolated themselves in his home  He is taking care to not share personal items and is cleaning all surfaces that are touched often, like counters, tabletops, and doorknobs using household cleaning sprays or wipes  He is wearing a mask when he leaves his room  Lab Results   Component Value Date    SARSCOV2 Negative 02/09/2022    SARSCOV2 Not Detected 09/30/2020       Review of Systems   Constitutional: Positive for chills, fatigue and fever  HENT: Positive for congestion and rhinorrhea  Respiratory: Positive for cough and chest tightness  Gastrointestinal: Negative for diarrhea, nausea and vomiting  Musculoskeletal: Positive for myalgias  Neurological: Positive for headaches       Current Outpatient Medications on File Prior to Visit   Medication Sig   • albuterol (2 5 mg/3 mL) 0 083 % nebulizer solution Take 3 mL (2 5 mg total) by nebulization every 4 (four) hours as needed for wheezing or shortness of breath   • albuterol (PROVENTIL HFA,VENTOLIN HFA) 90 mcg/act inhaler TAKE 2 PUFFS BY MOUTH EVERY 4 HOURS AS NEEDED FOR WHEEZE   • amLODIPine (NORVASC) 5 mg tablet TAKE 1 TABLET (5 MG TOTAL) BY MOUTH DAILY  • Dulera 200-5 MCG/ACT inhaler INHALE 2 PUFFS 2 (TWO) TIMES A DAY RINSE MOUTH AFTER USE  • EPINEPHrine (EPIPEN) 0 3 mg/0 3 mL SOAJ USE IN OUTER THIGH IN CASE OF AN EMERGENCY  DISP WHATEVER GENERIC EPINEPHRINE IS COVERED   • fluticasone (FLONASE) 50 mcg/act nasal spray SPRAY 1 SPRAY INTO EACH NOSTRIL EVERY DAY (Patient taking differently: As needed but every day for allergie season)   • loratadine (CLARITIN) 10 mg tablet Take 1 tablet (10 mg total) by mouth daily   • Multiple Vitamin (multivitamin) capsule Take 1 capsule by mouth daily   • [DISCONTINUED] LORazepam (Ativan) 0 5 mg tablet 1/2-1 qhs prn (Patient not taking: Reported on 12/12/2022)       Objective:    /73   Pulse 72   Temp 99 °F (37 2 °C) (Oral)   Resp 18   Wt 68 kg (150 lb)   SpO2 98%   BMI 27 44 kg/m²      Physical Exam  Vitals reviewed  Constitutional:       General: He is not in acute distress  Appearance: He is ill-appearing  He is not toxic-appearing or diaphoretic  HENT:      Nose: Congestion present  No rhinorrhea  Eyes:      General:         Right eye: No discharge  Left eye: No discharge  Pulmonary:      Effort: Pulmonary effort is normal  No respiratory distress  Lymphadenopathy:      Cervical: No cervical adenopathy  Neurological:      Mental Status: He is alert         Pari Stover MD

## 2022-12-14 ENCOUNTER — TELEMEDICINE (OUTPATIENT)
Dept: FAMILY MEDICINE CLINIC | Facility: CLINIC | Age: 47
End: 2022-12-14

## 2022-12-14 VITALS
DIASTOLIC BLOOD PRESSURE: 80 MMHG | HEART RATE: 90 BPM | OXYGEN SATURATION: 96 % | TEMPERATURE: 101 F | RESPIRATION RATE: 20 BRPM | SYSTOLIC BLOOD PRESSURE: 138 MMHG

## 2022-12-14 DIAGNOSIS — U07.1 COVID-19: Primary | ICD-10-CM

## 2022-12-14 DIAGNOSIS — J40 BRONCHITIS: ICD-10-CM

## 2022-12-14 RX ORDER — AMOXICILLIN AND CLAVULANATE POTASSIUM 600; 42.9 MG/5ML; MG/5ML
600 POWDER, FOR SUSPENSION ORAL 2 TIMES DAILY
Qty: 100 ML | Refills: 0 | Status: SHIPPED | OUTPATIENT
Start: 2022-12-14 | End: 2022-12-24

## 2022-12-14 NOTE — ASSESSMENT & PLAN NOTE
Day #4   Of  Sx, tolerating antiviral but still with temp and coughing up brown-yellow  Mucus, will add liquid Augmentin to cover for secondary bacterial infection, rest, fluids

## 2022-12-14 NOTE — LETTER
December 14, 2022     Patient: Tomasa Duboes  YOB: 1975  Date of Visit: 12/14/2022      To Whom it May Concern:    Belia Martinez is under my professional care  Erikadaria Umana was seen in my office on 12/14/2022  Erika Umana may return to work on 12/19/22  If you have any questions or concerns, please don't hesitate to call           Sincerely,          Adrian Carballo MD        CC: No Recipients

## 2022-12-14 NOTE — PROGRESS NOTES
COVID-19 Outpatient Progress Note    Assessment/Plan:    Problem List Items Addressed This Visit        Other    COVID-19 - Primary     Day #4   Of  Sx, tolerating antiviral but still with temp and coughing up brown-yellow  Mucus, will add liquid Augmentin to cover for secondary bacterial infection, rest, fluids         Relevant Medications    amoxicillin-clavulanate (AUGMENTIN) 600-42 9 MG/5ML suspension   Other Visit Diagnoses     Bronchitis        Relevant Medications    amoxicillin-clavulanate (AUGMENTIN) 600-42 9 MG/5ML suspension         Disposition:     Patient has asymptomatic or mild COVID-19 infection  Based off CDC guidelines, they were recommended to isolate for 5 days  If they are asymptomatic or symptoms are improving with no fevers in the past 24 hours, isolation may be ended followed by 5 days of wearing a mask when around othes to minimize risk of infecting others  If still have a fever or other symptoms have not improved, continue to isolate until they improve  Regardless of when they end isolation, avoid being around people who are more likely to get very sick from COVID-19 until at least day 11  Discussed symptom directed medication options with patient  Discussed vitamin D, vitamin C, and/or zinc supplementation with patient  I have spent 5 minutes directly with the patient  Encounter provider: Gee Dodge MD     Provider located at: [address] [address] [phone]     Recent Visits  Date Type Provider Dept   12/12/22 Telemedicine Gee Dodge MD [address] [place] [address] Primary Care   Showing recent visits within past 7 days and meeting all other requirements  Today's Visits  Date Type Provider Dept   12/14/22 Telemedicine Gee Dodge MD [facility]   Showing today's visits and meeting all other requirements  Future Appointments  No visits were found meeting these conditions    Showing future appointments within next 150 days and meeting all other requirements     This virtual check-in was done via [address] and patient was informed that this is a secure, HIPAA-compliant platform  He agrees to proceed  Patient agrees to participate in a virtual check in via telephone or video visit instead of presenting to the office to address urgent/immediate medical needs  Patient is aware this is a billable service  He acknowledged consent and understanding of privacy and security of the video platform  The patient has agreed to participate and understands they can discontinue the visit at any time  After connecting through [facility], the patient was identified by name and date of birth  Huey Lindsey was informed that this was a telemedicine visit and that the exam was being conducted confidentially over secure lines  My office door was closed  No one else was in the room  Huey Lindsey acknowledged consent and understanding of privacy and security of the telemedicine visit  I informed the patient that I have reviewed his record in Epic and presented the opportunity for him to ask any questions regarding the visit today  The patient agreed to participate  Verification of patient location:  Patient is located in the following state in which I hold an active license: [place]    Subjective:   Huey Lindsey is a 41 y o  male who has been screened for COVID-19  Patient's symptoms include fever, fatigue, nasal congestion and cough  - Date of symptom onset: 12/9/2022  - Date of positive COVID-19 test: 12/11/2022  Type of test: Home antigen  Patient with typical symptoms of COVID-19 and they attest that they were positive on home rapid antigen testing  Image of positive result is not able to be uploaded into their chart       COVID-19 vaccination status: Fully vaccinated with booster    Lab Results   Component Value Date    SARSCOV2 Negative 02/09/2022    [facility] 98 Not Detected 09/30/2020       Review of Systems Constitutional: Positive for fatigue and fever  HENT: Positive for congestion  Negative for ear pain  Respiratory: Positive for cough and wheezing  Gastrointestinal: Negative  Hematological: Negative for adenopathy  Current Outpatient Medications on File Prior to Visit   Medication Sig   • albuterol (2 5 mg/3 mL) 0 083 % nebulizer solution Take 3 mL (2 5 mg total) by nebulization every 4 (four) hours as needed for wheezing or shortness of breath   • albuterol (PROVENTIL HFA,VENTOLIN HFA) 90 mcg/act inhaler TAKE 2 PUFFS BY MOUTH EVERY 4 HOURS AS NEEDED FOR WHEEZE   • amLODIPine (NORVASC) 5 mg tablet TAKE 1 TABLET (5 MG TOTAL) BY MOUTH DAILY  • Dulera 200-5 MCG/ACT inhaler INHALE 2 PUFFS 2 (TWO) TIMES A DAY RINSE MOUTH AFTER USE  • EPINEPHrine (EPIPEN) 0 3 mg/0 3 mL SOAJ USE IN OUTER THIGH IN CASE OF AN EMERGENCY  DISP WHATEVER GENERIC EPINEPHRINE IS COVERED   • fluticasone (FLONASE) 50 mcg/act nasal spray SPRAY 1 SPRAY INTO EACH NOSTRIL EVERY DAY (Patient taking differently: As needed but every day for allergie season)   • loratadine (CLARITIN) 10 mg tablet Take 1 tablet (10 mg total) by mouth daily   • Multiple Vitamin (multivitamin) capsule Take 1 capsule by mouth daily   • nirmatrelvir & ritonavir (Paxlovid, 300/100,) tablet therapy pack Take 3 tablets by mouth 2 (two) times a day for 5 days Take 2 nirmatrelvir tablets + 1 ritonavir tablet together per dose       Objective:    /80   Pulse 90   Temp (!) 101 °F (38 3 °C)   Resp 20   SpO2 96%      Physical Exam  Vitals reviewed  Constitutional:       General: He is not in acute distress  Appearance: He is ill-appearing  He is not toxic-appearing  HENT:      Nose: Congestion present  No rhinorrhea  Eyes:      General:         Right eye: No discharge  Left eye: No discharge  Pulmonary:      Effort: Pulmonary effort is normal  No respiratory distress  Lymphadenopathy:      Cervical: No cervical adenopathy  Neurological:      Mental Status: He is alert         Tevin Staton MD

## 2022-12-16 ENCOUNTER — TELEMEDICINE (OUTPATIENT)
Dept: FAMILY MEDICINE CLINIC | Facility: CLINIC | Age: 47
End: 2022-12-16

## 2022-12-16 VITALS — TEMPERATURE: 99.9 F

## 2022-12-16 DIAGNOSIS — U07.1 COVID-19: Primary | ICD-10-CM

## 2022-12-16 NOTE — PROGRESS NOTES
COVID-19 Outpatient Progress Note    Assessment/Plan:    Problem List Items Addressed This Visit        Other    COVID-19 - Primary     Day #6,feeling  Better  On antibiotics, still low  Grade  Temp 99 9m rest, fluids, vitamins, will finish antiviral today another  Week of   Antibiotics, asthma sx  better           Disposition:     Patient has asymptomatic or mild COVID-19 infection  Based off CDC guidelines, they were recommended to isolate for 5 days  If they are asymptomatic or symptoms are improving with no fevers in the past 24 hours, isolation may be ended followed by 5 days of wearing a mask when around othes to minimize risk of infecting others  If still have a fever or other symptoms have not improved, continue to isolate until they improve  Regardless of when they end isolation, avoid being around people who are more likely to get very sick from COVID-19 until at least day 11  Discussed symptom directed medication options with patient  Discussed vitamin D, vitamin C, and/or zinc supplementation with patient  I have spent 3 minutes directly with the patient  Encounter provider: Barclay Osler, MD     Provider located at: [address] [address] [phone]     Recent Visits  Date Type Provider Dept   12/14/22 [id number] [address], [address] [place] [address] Primary Care   12/12/22 Telemedicine Barclay Osler, [address] [place] [address] Primary Care   Showing recent visits within past 7 days and meeting all other requirements  Today's Visits  Date Type Provider Dept   12/16/22 Telemedicine Barclay Osler, MD [facility]   Showing today's visits and meeting all other requirements  Future Appointments  No visits were found meeting these conditions    Showing future appointments within next 150 days and meeting all other requirements     This virtual check-in was done via [email or web address] [address] and patient was informed that this is a secure, HIPAA-compliant platform  He agrees to proceed  Patient agrees to participate in a virtual check in via telephone or video visit instead of presenting to the office to address urgent/immediate medical needs  Patient is aware this is a billable service  He acknowledged consent and understanding of privacy and security of the video platform  The patient has agreed to participate and understands they can discontinue the visit at any time  After connecting through [facility], the patient was identified by name and date of birth  Dian Boone was informed that this was a telemedicine visit and that the exam was being conducted confidentially over secure lines  My office door was closed  No one else was in the room  Dian Boone acknowledged consent and understanding of privacy and security of the telemedicine visit  I informed the patient that I have reviewed his record in Epic and presented the opportunity for him to ask any questions regarding the visit today  The patient agreed to participate  Verification of patient location:  Patient is located in the following state in which I hold an active license: [place]    Subjective:   Dian Boone is a 41 y o  male who has been screened for COVID-19  Symptom change since last report: improving  Patient's symptoms include fever, chills, nasal congestion, cough and headache      - Date of symptom onset: 12/9/2022  - Date of positive COVID-19 test: 12/12/2022  Type of test: Home antigen  Patient with typical symptoms of COVID-19 and they attest that they were positive on home rapid antigen testing  Image of positive result is not able to be uploaded into their chart  COVID-19 vaccination status: Fully vaccinated with booster    Pablo Gnosticism has been staying home and has isolated themselves in his home  He is taking care to not share personal items and is cleaning all surfaces that are touched often, like counters, tabletops, and doorknobs using household cleaning sprays or wipes   He is wearing a mask when he leaves his room  Lab Results   Component Value Date    SARSCOV2 Negative 02/09/2022    SARSCOV2 Not Detected 09/30/2020       Review of Systems   Constitutional: Positive for chills and fever  HENT: Positive for congestion  Respiratory: Positive for cough  Cardiovascular: Negative for chest pain  Gastrointestinal: Negative  Neurological: Positive for headaches  Current Outpatient Medications on File Prior to Visit   Medication Sig   • albuterol (2 5 mg/3 mL) 0 083 % nebulizer solution Take 3 mL (2 5 mg total) by nebulization every 4 (four) hours as needed for wheezing or shortness of breath   • albuterol (PROVENTIL HFA,VENTOLIN HFA) 90 mcg/act inhaler TAKE 2 PUFFS BY MOUTH EVERY 4 HOURS AS NEEDED FOR WHEEZE   • amLODIPine (NORVASC) 5 mg tablet TAKE 1 TABLET (5 MG TOTAL) BY MOUTH DAILY  • amoxicillin-clavulanate (AUGMENTIN) 600-42 9 MG/5ML suspension Take 5 mL (600 mg total) by mouth 2 (two) times a day for 10 days   • Dulera 200-5 MCG/ACT inhaler INHALE 2 PUFFS 2 (TWO) TIMES A DAY RINSE MOUTH AFTER USE  • EPINEPHrine (EPIPEN) 0 3 mg/0 3 mL SOAJ USE IN OUTER THIGH IN CASE OF AN EMERGENCY  DISP WHATEVER GENERIC EPINEPHRINE IS COVERED   • fluticasone (FLONASE) 50 mcg/act nasal spray SPRAY 1 SPRAY INTO EACH NOSTRIL EVERY DAY (Patient taking differently: As needed but every day for allergie season)   • loratadine (CLARITIN) 10 mg tablet Take 1 tablet (10 mg total) by mouth daily   • Multiple Vitamin (multivitamin) capsule Take 1 capsule by mouth daily   • nirmatrelvir & ritonavir (Paxlovid, 300/100,) tablet therapy pack Take 3 tablets by mouth 2 (two) times a day for 5 days Take 2 nirmatrelvir tablets + 1 ritonavir tablet together per dose       Objective:    Temp 99 9 °F (37 7 °C)      Physical Exam  Vitals reviewed  Constitutional:       Appearance: Normal appearance  HENT:      Nose: Congestion present  No rhinorrhea     Eyes:      General:         Right eye: No discharge  Left eye: No discharge  Pulmonary:      Effort: Pulmonary effort is normal  No respiratory distress  Lymphadenopathy:      Cervical: No cervical adenopathy  Neurological:      Mental Status: He is alert         Gee Dodge MD

## 2022-12-16 NOTE — ASSESSMENT & PLAN NOTE
Day #6,feeling  Better  On antibiotics, still low  Grade  Temp 99 9m rest, fluids, vitamins, will finish antiviral today another  Week of   Antibiotics, asthma sx  better

## 2022-12-16 NOTE — LETTER
December 16, 2022     Patient: Wilbert Platt  YOB: 1975  Date of Visit: 12/16/2022      To Whom it May Concern:    Octavia Pitts is under my professional care  Mariliadaria Derebeca was seen in my office on 12/16/2022  Laury Martinez may return to work on 12/19/22  If you have any questions or concerns, please don't hesitate to call           Sincerely,          Ila Phoenix MD        CC: No Recipients

## 2023-01-18 ENCOUNTER — RA CDI HCC (OUTPATIENT)
Dept: OTHER | Facility: HOSPITAL | Age: 48
End: 2023-01-18

## 2023-01-19 ENCOUNTER — APPOINTMENT (OUTPATIENT)
Dept: URGENT CARE | Facility: CLINIC | Age: 48
End: 2023-01-19

## 2023-01-23 ENCOUNTER — OCCMED (OUTPATIENT)
Dept: URGENT CARE | Facility: CLINIC | Age: 48
End: 2023-01-23

## 2023-01-23 DIAGNOSIS — L50.6 CONTACT URTICARIA: Primary | ICD-10-CM

## 2023-01-25 ENCOUNTER — OFFICE VISIT (OUTPATIENT)
Dept: FAMILY MEDICINE CLINIC | Facility: CLINIC | Age: 48
End: 2023-01-25

## 2023-01-25 VITALS
HEIGHT: 62 IN | OXYGEN SATURATION: 90 % | SYSTOLIC BLOOD PRESSURE: 128 MMHG | WEIGHT: 150 LBS | HEART RATE: 70 BPM | DIASTOLIC BLOOD PRESSURE: 82 MMHG | BODY MASS INDEX: 27.6 KG/M2

## 2023-01-25 DIAGNOSIS — Z12.5 SCREENING FOR PROSTATE CANCER: ICD-10-CM

## 2023-01-25 DIAGNOSIS — Z11.1 SCREENING FOR TUBERCULOSIS: Primary | ICD-10-CM

## 2023-01-25 DIAGNOSIS — Z00.00 WELL ADULT EXAM: ICD-10-CM

## 2023-01-25 DIAGNOSIS — G89.29 CHRONIC BILATERAL LOW BACK PAIN WITHOUT SCIATICA: ICD-10-CM

## 2023-01-25 DIAGNOSIS — M54.50 CHRONIC BILATERAL LOW BACK PAIN WITHOUT SCIATICA: ICD-10-CM

## 2023-01-25 DIAGNOSIS — J45.40 MODERATE PERSISTENT ASTHMA, UNSPECIFIED WHETHER COMPLICATED: ICD-10-CM

## 2023-01-25 PROBLEM — U07.1 COVID-19: Status: RESOLVED | Noted: 2022-12-12 | Resolved: 2023-01-25

## 2023-01-25 PROBLEM — R60.0 FACIAL EDEMA: Status: RESOLVED | Noted: 2022-09-16 | Resolved: 2023-01-25

## 2023-01-25 RX ORDER — CYCLOBENZAPRINE HCL 10 MG
10 TABLET ORAL 3 TIMES DAILY PRN
Qty: 30 TABLET | Refills: 1 | Status: SHIPPED | OUTPATIENT
Start: 2023-01-25

## 2023-01-25 RX ORDER — MOMETASONE FUROATE AND FORMOTEROL FUMARATE DIHYDRATE 200; 5 UG/1; UG/1
2 AEROSOL RESPIRATORY (INHALATION) 2 TIMES DAILY
Qty: 13 G | Refills: 5 | Status: SHIPPED | OUTPATIENT
Start: 2023-01-25

## 2023-01-25 NOTE — PROGRESS NOTES
[address] [facility]    NAME: Mariya John  AGE: 41 y o  SEX: male  : 1975     DATE: 2023     Assessment and Plan:     Problem List Items Addressed This Visit        Respiratory    Moderate persistent asthma    Relevant Medications    mometasone-formoterol (Dulera) 200-5 MCG/ACT inhaler       Other    Chronic bilateral low back pain without sciatica    Relevant Medications    cyclobenzaprine (FLEXERIL) 10 mg tablet   Other Visit Diagnoses     Screening for tuberculosis    -  Primary    Relevant Orders    TB Skin Test (Completed)    Well adult exam        Screening for prostate cancer        Relevant Orders    PSA, Total Screen          Immunizations and preventive care screenings were discussed with patient today  Appropriate education was printed on patient's after visit summary  Discussed risks and benefits of prostate cancer screening  We discussed the controversial history of PSA screening for prostate cancer in the [place] as well as the risk of over detection and over treatment of prostate cancer by way of PSA screening  The patient understands that PSA blood testing is an imperfect way to screen for prostate cancer and that elevated PSA levels in the blood may also be caused by infection, inflammation, prostatic trauma or manipulation, urological procedures, or by benign prostatic enlargement  The role of the digital rectal examination in prostate cancer screening was also discussed and I discussed with him that there is large interobserver variability in the findings of digital rectal examination  Counseling:  Alcohol/drug use: no concerns  Dental Health: discussed importance of regular tooth brushing, flossing, and dental visits  [facility]  Injury prevention: discussed safety/seat belts, safety helmets, smoke detectors, carbon dioxide detectors  Sexual health: discussed sexually transmitted diseases, partner selection, use of condoms, avoidance of unintended pregnancy, and contraceptive alternatives  Exercise: the importance of regular exercise/physical activity was discussed  Recommend exercise 3-5 times per week for at least 30 minutes  Return in about 6 months (around 7/25/2023) for PPD Read @9am      Chief Complaint:     Chief Complaint   Patient presents with   • Annual Exam     Patient present today for his 4 month follow up and annual physical to fill out forms for work  History of Present Illness:     Adult Annual Physical   Patient here for a comprehensive physical exam  The patient reports no health conditions to report   Vital signs are stable  Diet and Physical Activity  Diet/Nutrition: Patient offers no concerns  Exercise: three times a week cardio and weight lifting  Depression Screening  PHQ-2/9 Depression Screening    Little interest or pleasure in doing things: 0 - not at all  Feeling down, depressed, or hopeless: 0 - not at all       General Health  Sleep: Denies any insomia  Hearing: Hearing is adequate  Vision: Patient had vison test 1 5 years ago with no glaucoma, or diabetes retinopathy noted      Dental: Patient had a tooth extraction and implant placed x3 years ago  No issues since then           Health  Symptoms include: none     Review of Systems:     Review of Systems   Constitutional: Negative  HENT: Negative  Eyes: Negative  Respiratory: Negative  Cardiovascular: Negative  Gastrointestinal: Negative  Endocrine: Negative  Genitourinary: Negative  Musculoskeletal: Negative  Skin: Negative  Neurological: Negative  Hematological: Negative  Psychiatric/Behavioral: Negative         Past Medical History:     Past Medical History:   Diagnosis Date   • Acute appendicitis    • Asthma    • Chronic kidney disease    • Hypertension    • Membranous nephropathy determined by biopsy 04/24/2019   • Proteinuria       Past Surgical History: Past Surgical History:   Procedure Laterality Date   • APPENDECTOMY     • IR BIOPSY KIDNEY MASS  2/21/2019   • SHOULDER SURGERY     • TRIGGER FINGER RELEASE        Family History:     Family History   Problem Relation Age of Onset   • No Known Problems Mother    • Diabetes Father    • Kidney failure Father    • Diabetes Family    • No Known Problems Sister    • Ulcers Brother       Social History:     Social History     Socioeconomic History   • Marital status: Single     Spouse name: None   • Number of children: None   • Years of education: None   • Highest education level: None   Occupational History   • None   Tobacco Use   • Smoking status: Never   • Smokeless tobacco: Never   Vaping Use   • Vaping Use: Never used   Substance and Sexual Activity   • Alcohol use: Not Currently     Comment: Half of a beer 2 months ago  • Drug use: Never   • Sexual activity: Yes     Partners: Female     Birth control/protection: Condom Male, None   Other Topics Concern   • None   Social History Narrative    Uses seat belt     Alevism     Employed    No children    No living will     Single     Supportive and safe      Social Determinants of Health     Financial Resource Strain: Not on file   Food Insecurity: Not on file   Transportation Needs: Not on file   Physical Activity: Not on file   Stress: Not on file   Social Connections: Not on file   Intimate Partner Violence: Not on file   Housing Stability: Not on file      Current Medications:     Current Outpatient Medications   Medication Sig Dispense Refill   • albuterol (2 5 mg/3 mL) 0 083 % nebulizer solution Take 3 mL (2 5 mg total) by nebulization every 4 (four) hours as needed for wheezing or shortness of breath 100 mL 5   • albuterol (PROVENTIL HFA,VENTOLIN HFA) 90 mcg/act inhaler TAKE 2 PUFFS BY MOUTH EVERY 4 HOURS AS NEEDED FOR WHEEZE 6 7 g 5   • amLODIPine (NORVASC) 5 mg tablet TAKE 1 TABLET (5 MG TOTAL) BY MOUTH DAILY   90 tablet 2   • cyclobenzaprine (FLEXERIL) 10 mg tablet Take 1 tablet (10 mg total) by mouth 3 (three) times a day as needed for muscle spasms 30 tablet 1   • EPINEPHrine (EPIPEN) 0 3 mg/0 3 mL SOAJ USE IN OUTER THIGH IN CASE OF AN EMERGENCY  DISP WHATEVER GENERIC EPINEPHRINE IS COVERED     • fluticasone (FLONASE) 50 mcg/act nasal spray SPRAY 1 SPRAY INTO EACH NOSTRIL EVERY DAY (Patient taking differently: As needed but every day for allergie season) 24 mL 3   • loratadine (CLARITIN) 10 mg tablet Take 1 tablet (10 mg total) by mouth daily 30 tablet 3   • mometasone-formoterol (Dulera) 200-5 MCG/ACT inhaler Inhale 2 puffs 2 (two) times a day Rinse mouth after use  13 g 5   • Multiple Vitamin (multivitamin) capsule Take 1 capsule by mouth daily       No current facility-administered medications for this visit  Allergies: Allergies   Allergen Reactions   • Molds & Smuts Shortness Of Breath and Rash   • Other Shortness Of Breath   • Shellfish Allergy - Food Allergy Hives   • Shellfish-Derived Products - Food Allergy Hives      Physical Exam:     /82 (BP Location: Right arm, Patient Position: Sitting, Cuff Size: Large)   Pulse 70   Ht 5' 2" (1 575 m)   Wt 68 kg (150 lb)   SpO2 90%   BMI 27 44 kg/m²     Physical Exam  Constitutional:       Appearance: Normal appearance  HENT:      Head: Normocephalic  Right Ear: Tympanic membrane and ear canal normal       Left Ear: Ear canal normal  A middle ear effusion is present  Nose: Nose normal    Cardiovascular:      Rate and Rhythm: Normal rate and regular rhythm  Pulses: Normal pulses  Heart sounds: Normal heart sounds  Pulmonary:      Effort: Pulmonary effort is normal       Breath sounds: Normal breath sounds  Abdominal:      General: Abdomen is flat  Bowel sounds are normal    Musculoskeletal:         General: Normal range of motion  Cervical back: Normal range of motion  Skin:     General: Skin is warm and dry        Capillary Refill: Capillary refill takes less than 2 seconds  Neurological:      General: No focal deficit present  Mental Status: He is alert     Psychiatric:         Mood and Affect: Mood normal           Marlin Narvaez MD  [address]

## 2023-01-27 ENCOUNTER — CLINICAL SUPPORT (OUTPATIENT)
Dept: FAMILY MEDICINE CLINIC | Facility: CLINIC | Age: 48
End: 2023-01-27

## 2023-01-27 DIAGNOSIS — Z11.1 ENCOUNTER FOR PPD SKIN TEST READING: Primary | ICD-10-CM

## 2023-01-27 LAB
INDURATION: 0 MM
TB SKIN TEST: NEGATIVE

## 2023-01-27 NOTE — PROGRESS NOTES
PPD Reading Note  PPD read and results entered in [address]  Result: 0 mm induration    Interpretation: NEG  If test not read within 48-72 hours of initial placement, patient advised to repeat in other arm 1-3 weeks after this test   Allergic reaction: No

## 2023-02-07 ENCOUNTER — APPOINTMENT (EMERGENCY)
Dept: CT IMAGING | Facility: HOSPITAL | Age: 48
End: 2023-02-07

## 2023-02-07 ENCOUNTER — HOSPITAL ENCOUNTER (EMERGENCY)
Facility: HOSPITAL | Age: 48
Discharge: HOME | End: 2023-02-08
Attending: EMERGENCY MEDICINE

## 2023-02-07 VITALS
DIASTOLIC BLOOD PRESSURE: 76 MMHG | HEART RATE: 99 BPM | SYSTOLIC BLOOD PRESSURE: 158 MMHG | RESPIRATION RATE: 18 BRPM | BODY MASS INDEX: 27.82 KG/M2 | WEIGHT: 152.12 LBS | TEMPERATURE: 98.3 F | OXYGEN SATURATION: 99 %

## 2023-02-07 DIAGNOSIS — K57.32 SIGMOID DIVERTICULITIS: Primary | ICD-10-CM

## 2023-02-07 LAB
BACTERIA UR QL AUTO: NORMAL /HPF
BILIRUB UR QL STRIP: NEGATIVE
CLARITY UR: CLEAR
COLOR UR: ABNORMAL
GLUCOSE UR STRIP-MCNC: NEGATIVE MG/DL
HGB UR QL STRIP.AUTO: ABNORMAL
KETONES UR STRIP-MCNC: NEGATIVE MG/DL
LEUKOCYTE ESTERASE UR QL STRIP: NEGATIVE
NITRITE UR QL STRIP: NEGATIVE
NON-SQ EPI CELLS URNS QL MICRO: NORMAL /HPF
PH UR STRIP.AUTO: 7 [PH]
PROT UR STRIP-MCNC: ABNORMAL MG/DL
RBC #/AREA URNS AUTO: NORMAL /HPF
SP GR UR STRIP.AUTO: 1.01 (ref 1–1.03)
UROBILINOGEN UR STRIP-ACNC: <2 MG/DL
WBC #/AREA URNS AUTO: NORMAL /HPF

## 2023-02-08 LAB
C TRACH DNA SPEC QL NAA+PROBE: NEGATIVE
N GONORRHOEA DNA SPEC QL NAA+PROBE: NEGATIVE

## 2023-02-08 RX ORDER — AMOXICILLIN AND CLAVULANATE POTASSIUM 875; 125 MG/1; MG/1
1 TABLET, FILM COATED ORAL 3 TIMES DAILY
Qty: 21 TABLET | Refills: 0 | Status: SHIPPED | OUTPATIENT
Start: 2023-02-08 | End: 2023-02-08 | Stop reason: DRUGHIGH

## 2023-02-08 RX ORDER — AMOXICILLIN AND CLAVULANATE POTASSIUM 400; 57 MG/5ML; MG/5ML
800 POWDER, FOR SUSPENSION ORAL 3 TIMES DAILY
Qty: 200 ML | Refills: 0 | Status: SHIPPED | OUTPATIENT
Start: 2023-02-08 | End: 2023-02-15

## 2023-02-08 RX ORDER — AMOXICILLIN AND CLAVULANATE POTASSIUM 400; 57 MG/5ML; MG/5ML
875 POWDER, FOR SUSPENSION ORAL ONCE
Status: COMPLETED | OUTPATIENT
Start: 2023-02-08 | End: 2023-02-08

## 2023-02-08 RX ORDER — AMOXICILLIN AND CLAVULANATE POTASSIUM 875; 125 MG/1; MG/1
1 TABLET, FILM COATED ORAL ONCE
Status: DISCONTINUED | OUTPATIENT
Start: 2023-02-08 | End: 2023-02-08

## 2023-02-08 RX ADMIN — AMOXICILLIN AND CLAVULANATE POTASSIUM 875 MG OF AMOXICILLIN: 400; 57 POWDER, FOR SUSPENSION ORAL at 01:48

## 2023-02-08 NOTE — ED PROVIDER NOTES
History  Chief Complaint   Patient presents with   • Abdominal Pain     Pt reports worsening lower abdominal pain since yesterday  Reports feels not fully emptying bladder  Reports urgency with oliguria and pressure  Denies N/V     HPI    Prior to Admission Medications   Prescriptions Last Dose Informant Patient Reported? Taking? EPINEPHrine (EPIPEN) 0 3 mg/0 3 mL SOAJ   Yes No   Sig: USE IN OUTER THIGH IN CASE OF AN EMERGENCY  DISP WHATEVER GENERIC EPINEPHRINE IS COVERED   Multiple Vitamin (multivitamin) capsule   Yes No   Sig: Take 1 capsule by mouth daily   albuterol (2 5 mg/3 mL) 0 083 % nebulizer solution   No No   Sig: Take 3 mL (2 5 mg total) by nebulization every 4 (four) hours as needed for wheezing or shortness of breath   albuterol (PROVENTIL HFA,VENTOLIN HFA) 90 mcg/act inhaler   No No   Sig: TAKE 2 PUFFS BY MOUTH EVERY 4 HOURS AS NEEDED FOR WHEEZE   amLODIPine (NORVASC) 5 mg tablet   No No   Sig: TAKE 1 TABLET (5 MG TOTAL) BY MOUTH DAILY  cyclobenzaprine (FLEXERIL) 10 mg tablet   No No   Sig: Take 1 tablet (10 mg total) by mouth 3 (three) times a day as needed for muscle spasms   fluticasone (FLONASE) 50 mcg/act nasal spray   No No   Sig: SPRAY 1 SPRAY INTO EACH NOSTRIL EVERY DAY   Patient taking differently: As needed but every day for allergie season   loratadine (CLARITIN) 10 mg tablet   No No   Sig: Take 1 tablet (10 mg total) by mouth daily   mometasone-formoterol (Dulera) 200-5 MCG/ACT inhaler   No No   Sig: Inhale 2 puffs 2 (two) times a day Rinse mouth after use        Facility-Administered Medications: None       Past Medical History:   Diagnosis Date   • Acute appendicitis    • Asthma    • Chronic kidney disease    • Hypertension    • Membranous nephropathy determined by biopsy 04/24/2019   • Proteinuria        Past Surgical History:   Procedure Laterality Date   • APPENDECTOMY     • IR BIOPSY KIDNEY MASS  2/21/2019   • SHOULDER SURGERY     • TRIGGER FINGER RELEASE         Family History Problem Relation Age of Onset   • No Known Problems Mother    • Diabetes Father    • Kidney failure Father    • Diabetes Family    • No Known Problems Sister    • Ulcers Brother      I have reviewed and agree with the history as documented  E-Cigarette/Vaping   • E-Cigarette Use Never User      E-Cigarette/Vaping Substances   • Nicotine No    • THC No    • CBD No      Social History     Tobacco Use   • Smoking status: Never   • Smokeless tobacco: Never   Vaping Use   • Vaping Use: Never used   Substance Use Topics   • Alcohol use: Not Currently     Comment: Half of a beer 2 months ago  • Drug use: Never       Review of Systems    Physical Exam  Physical Exam    Vital Signs  ED Triage Vitals [02/07/23 2122]   Temperature Pulse Respirations Blood Pressure SpO2   98 3 °F (36 8 °C) 99 18 158/76 99 %      Temp Source Heart Rate Source Patient Position - Orthostatic VS BP Location FiO2 (%)   Oral Monitor -- -- --      Pain Score       9           Vitals:    02/07/23 2122   BP: 158/76   Pulse: 99         Visual Acuity      ED Medications  Medications - No data to display    Diagnostic Studies  Results Reviewed     None                 No orders to display              Procedures  Procedures         ED Course                                             MDM    Disposition  Final diagnoses:   None     ED Disposition     None      Follow-up Information    None         Patient's Medications   Discharge Prescriptions    No medications on file       No discharge procedures on file      PDMP Review     None          ED Provider  Electronically Signed by Constitutional:       Appearance: He is well-developed  HENT:      Head: Normocephalic and atraumatic  Cardiovascular:      Rate and Rhythm: Normal rate and regular rhythm  Pulmonary:      Effort: Pulmonary effort is normal  No respiratory distress  Abdominal:      Palpations: Abdomen is soft  Hernia: No hernia is present  Comments: Mild suprapubic tenderness without guarding  Skin:     General: Skin is warm and dry  Neurological:      General: No focal deficit present  Mental Status: He is alert and oriented to person, place, and time  Psychiatric:         Mood and Affect: Mood normal          Behavior: Behavior normal          Vital Signs  ED Triage Vitals [02/07/23 2122]   Temperature Pulse Respirations Blood Pressure SpO2   98 3 °F (36 8 °C) 99 18 158/76 99 %      Temp Source Heart Rate Source Patient Position - Orthostatic VS BP Location FiO2 (%)   Oral Monitor -- -- --      Pain Score       9           Vitals:    02/07/23 2122   BP: 158/76   Pulse: 99         Visual Acuity      ED Medications  Medications   amoxicillin-clavulanate (AUGMENTIN) oral suspension 875 mg of amoxicillin (875 mg of amoxicillin Oral Given 2/8/23 0148)       Diagnostic Studies  Results Reviewed     Procedure Component Value Units Date/Time    Chlamydia/GC amplified DNA by PCR [[id number]]  (Normal) Collected: 02/07/23 2254    Lab Status: Final result Updated: 02/08/23 2215     N gonorrhoeae, DNA Probe Negative     Chlamydia trachomatis, DNA Probe Negative    Narrative: This test was performed using the FDA-approved Paul 6800 CT/NG assay ([employer])  This test uses real-time PCR to detect Chlamydia trachomatis (CT) and Neisseria gonorrhoeae (NG)  This instrument and assay have been validated by the  and performing laboratory and verified by the performing laboratory  This test is intended as an aid in the diagnosis of chlamydial and gonococcal disease   This test has not been evaluated in patients younger than 15years of age and is not recommended for evaluation of suspected sexual abuse  This assay is not intended to replace other exams or tests for diagnosis of urogenital infection by causative factors other than Chalmydia trachomatis (CT) and Neisseria gonorrhoeae (NG)  Additional testing is recommended when the results do not correlate with clinical signs and symptoms  Procedural Limitations  This assay has only been validated for use with male and female urine, clinician-instructed self-collected vaginal swab specimens, clinician-collected vaginal swab specimens, endocervical swab specimens collected in noelle® PCR Media and cervical specimens collected in PreservCyt® Solution  Assay performance has not been validated for use with other collection media and/or specimen types  Detection of C  trachomatis and Mateus Goldberg is dependent on the number of organisms present in the specimen  Detection may be affected by specimen collection methods, patient factors, stage of infection, infecting strains, and presence of polymerase/PCR inhibitors  When CT is present at very high concentrations, the detection of NG present at concentrations near the limit of detection may be impacted  The presence of mucus in endocervical specimens may lead to false negative results  The presence of whole blood in urine and cervical specimens collected in PreservCyt Solution may lead to false negative and/or invalid test results  Urine testing is recommended to be performed on first catch urine samples  The effects of other collection variables have not been evaluated at this time  The effects of vaginal discharge, tampon use, douching, and other collection variables have not been evaluated at this time  This assay has not been evaluated with patients currently being treated with antimicrobial agents active against CT or NG, or patients with a history of hysterectomy       Urine Microscopic [[id number]]  (Normal) Collected: 02/07/23 2223    Lab Status: Final result Specimen: Urine, Clean Catch Updated: 02/07/23 2239     RBC, UA 1-2 /hpf      WBC, UA 1-2 /hpf      Epithelial Cells None Seen /hpf      Bacteria, UA Occasional /hpf     UA w Reflex to Microscopic w Reflex to Culture [[id number]]  (Abnormal) Collected: 02/07/23 2223    Lab Status: Final result Specimen: Urine, Clean Catch Updated: 02/07/23 2238     Color, UA Light Yellow     Clarity, UA Clear     Specific Gravity, UA 1 013     pH, UA 7 0     Leukocytes, UA Negative     Nitrite, UA Negative     Protein,  (3+) mg/dl      Glucose, UA Negative mg/dl      Ketones, UA Negative mg/dl      Urobilinogen, UA <2 0 mg/dl      Bilirubin, UA Negative     Occult Blood, UA Small                 CT renal stone study abdomen pelvis without contrast   Final Result by Mary Matt MD (02/08 0053)      1  Focal inflammatory change about the proximal sigmoid colon compatible with diverticulitis  No drainable abscess or evidence of macro perforation  2   No significant renal, ureteral, or bladder calculus  No hydronephrosis  The study was marked in [facility] for immediate notification  Workstation performed: [id number]                    Procedures  Procedures         ED Course  ED Course as of 02/24/23 1711   Wed Feb 08, 2023   0052 UA unremarkable -- Plan CT A/P to r/o alternative pathology  0114 Patient noted he cannot swallow large pills  Will change Augmentin to suspension instead of the tablet  0114 Discussed CT showing sigmoid diverticulitis  Will treat with Augmentin 3 times daily  Discussed returning to the ED if he has worsening symptoms  Otherwise PCP follow-up  Medical Decision Making  53-year-old male presented with suprapubic pain and urinary urgency beginning yesterday  No history of UTI  Mild tenderness on exam   UA negative for acute infection but was notable for protein    CT of the abdomen/pelvis notable for uncomplicated acute sigmoid diverticulitis  Likely urinary symptoms secondary to adjacent inflammatory changes of the sigmoid colon  Starting Augmentin for treatment  Will have follow-up with PCP for diverticulitis follow-up as well as follow-up of proteinuria  Stable for discharge  Should return if he has worsening symptoms  Sigmoid diverticulitis: acute illness or injury  Amount and/or Complexity of Data Reviewed  Labs: ordered  Radiology: ordered  Risk  Prescription drug management  Disposition  Final diagnoses:   Sigmoid diverticulitis     Time reflects when diagnosis was documented in both MDM as applicable and the Disposition within this note     Time User Action Codes Description Comment    2/8/2023  1:04 AM Marilee Dias Add [K57 [id number]] Sigmoid diverticulitis       ED Disposition     ED Disposition   Discharge    Condition   Stable    Date/Time   Wed Feb 8, 2023  1:03 AM    Comment   Chepe Dumont discharge to home/self care                 Follow-up Information     Follow up With Specialties Details Why Contact Info Additional Information    Karis Jones MD [facility] Medicine   [address] [place] [address] Emergency Department Emergency Medicine  If symptoms worsen [address] [facility] [id number] [place] [address] Emergency Department, [address], [place], [place], [address]          Discharge Medication List as of 2/8/2023  1:05 AM      START taking these medications    Details   amoxicillin-clavulanate (AUGMENTIN) 875-125 mg per tablet Take 1 tablet by mouth 3 (three) times a day for 7 days, Starting Wed 2/8/2023, Until Wed 2/15/2023, Normal         CONTINUE these medications which have NOT CHANGED    Details   albuterol (2 5 mg/3 mL) 0 083 % nebulizer solution Take 3 mL (2 5 mg total) by nebulization every 4 (four) hours as needed for wheezing or shortness of breath, Starting Wed 12/29/2021, Normal      albuterol (PROVENTIL HFA,VENTOLIN HFA) 90 mcg/act inhaler TAKE 2 PUFFS BY MOUTH EVERY 4 HOURS AS NEEDED FOR WHEEZE, Normal      amLODIPine (NORVASC) 5 mg tablet TAKE 1 TABLET (5 MG TOTAL) BY MOUTH DAILY  , Starting Mon 10/10/2022, Normal      cyclobenzaprine (FLEXERIL) 10 mg tablet Take 1 tablet (10 mg total) by mouth 3 (three) times a day as needed for muscle spasms, Starting Wed 1/25/2023, Normal      EPINEPHrine (EPIPEN) 0 3 mg/0 3 mL SOAJ USE IN OUTER THIGH IN CASE OF AN EMERGENCY  DISP WHATEVER GENERIC EPINEPHRINE IS COVERED, Historical Med      fluticasone (FLONASE) 50 mcg/act nasal spray SPRAY 1 SPRAY INTO EACH NOSTRIL EVERY DAY, Normal      loratadine (CLARITIN) 10 mg tablet Take 1 tablet (10 mg total) by mouth daily, Starting u 6/9/2022, Normal      mometasone-formoterol (Dulera) 200-5 MCG/ACT inhaler Inhale 2 puffs 2 (two) times a day Rinse mouth after use , Starting Wed 1/25/2023, Normal      Multiple Vitamin (multivitamin) capsule Take 1 capsule by mouth daily, Historical Med             No discharge procedures on file      PDMP Review     None          ED Provider  Electronically Signed by           Kristi Carranza MD  02/24/23 0055

## 2023-02-20 ENCOUNTER — TELEPHONE (OUTPATIENT)
Dept: FAMILY MEDICINE CLINIC | Facility: CLINIC | Age: 48
End: 2023-02-20

## 2023-02-20 NOTE — TELEPHONE ENCOUNTER
Isabel from [facility] called into the office in regards of a Physical form Dioni Lipscomb filled out feb 2  Isabel mention she star two questions in the form that Dioni Lipscomb filled out incorrectly the first line has to have date of physical and the second one has to say mentoux  The forms will be paced in cristina folder  Thank You

## 2023-06-09 ENCOUNTER — TELEMEDICINE (OUTPATIENT)
Dept: FAMILY MEDICINE CLINIC | Facility: CLINIC | Age: 48
End: 2023-06-09
Payer: COMMERCIAL

## 2023-06-09 VITALS — TEMPERATURE: 98 F

## 2023-06-09 DIAGNOSIS — J01.40 ACUTE NON-RECURRENT PANSINUSITIS: Primary | ICD-10-CM

## 2023-06-09 DIAGNOSIS — J45.41 MODERATE PERSISTENT ASTHMA WITH ACUTE EXACERBATION: ICD-10-CM

## 2023-06-09 PROCEDURE — 99214 OFFICE O/P EST MOD 30 MIN: CPT | Performed by: NURSE PRACTITIONER

## 2023-06-09 RX ORDER — METHYLPREDNISOLONE 4 MG/1
TABLET ORAL
Qty: 21 EACH | Refills: 0 | Status: SHIPPED | OUTPATIENT
Start: 2023-06-09

## 2023-06-09 RX ORDER — AZITHROMYCIN 200 MG/5ML
POWDER, FOR SUSPENSION ORAL
Qty: 37.7 ML | Refills: 0 | Status: SHIPPED | OUTPATIENT
Start: 2023-06-09 | End: 2023-06-14

## 2023-06-09 NOTE — PATIENT INSTRUCTIONS
Problem List Items Addressed This Visit          Respiratory    Moderate persistent asthma     Patient's symptoms are currently being appropriately managed with [facility] and albuterol use  Medrol Dosepak was ordered to help with any acute airway inflammation as well  Relevant Medications    methylPREDNISolone 4 MG tablet therapy pack    Acute non-recurrent pansinusitis - Primary     Azithromycin and Medrol Dosepak were ordered to treat acute sinusitis  Patient was advised to continue using Coricidin as needed to help with cough           Relevant Medications    azithromycin (ZITHROMAX) 200 mg/5 mL suspension    methylPREDNISolone 4 MG tablet therapy pack

## 2023-06-09 NOTE — PROGRESS NOTES
Virtual Regular Visit    Verification of patient location:    Patient is located at Home in the following state in which I hold an active license [place]      Assessment/Plan:    Problem List Items Addressed This Visit        Respiratory    Moderate persistent asthma     Patient's symptoms are currently being appropriately managed with [facility] and albuterol use  Medrol Dosepak was ordered to help with any acute airway inflammation as well  Relevant Medications    methylPREDNISolone 4 MG tablet therapy pack    Acute non-recurrent pansinusitis - Primary     Azithromycin and Medrol Dosepak were ordered to treat acute sinusitis  Patient was advised to continue using Coricidin as needed to help with cough  Relevant Medications    azithromycin (ZITHROMAX) 200 mg/5 mL suspension    methylPREDNISolone 4 MG tablet therapy pack            Reason for visit is   Chief Complaint   Patient presents with   • Virtual Regular Visit   • Nasal Congestion   • Wheezing   • Cough     Onset Tuesday 2 home covid tests were negative  • Virtual Regular Visit        Encounter provider VERITO Carreno    Provider located at [address] [address] [phone]      Recent Visits  No visits were found meeting these conditions  Showing recent visits within past 7 days and meeting all other requirements  Today's Visits  Date Type Provider Dept   06/09/23 Telemedicine Irene Douglas [facility]   Showing today's visits and meeting all other requirements  Future Appointments  No visits were found meeting these conditions  Showing future appointments within next 150 days and meeting all other requirements       The patient was identified by name and date of birth  Jeysonbree aLngston was informed that this is a telemedicine visit and that the visit is being conducted through the [employer]  He agrees to proceed     My office door was closed  No one else was in the room  He acknowledged consent and understanding of privacy and security of the video platform  The patient has agreed to participate and understands they can discontinue the visit at any time  Patient is aware this is a billable service  Subjective  Vanessa Smith is a 41 y o  male    URI symptoms: Patient reports over the past 4 days he has been having symptoms of rhinorrhea, nasal congestion, productive cough, bilateral ear congestion, sinus pressure and congestion, and PND  Patient currently denies any fevers or chills  Patient reports that he has completed 2 home COVID tests since his symptoms started and both tests were negative  He reports that his most recent COVID test was completed yesterday  Asthma: Patient is currently managed on Dulera bid and Albuterol nebulizer solution as needed  The patient reports that he has been having some increased shortness of breath since his symptoms started however, he has been using the [facility] twice daily as well as his albuterol nebulizer solution every 8 hours and this has been controlling his symptoms  Past Medical History:   Diagnosis Date   • Acute appendicitis    • Asthma    • Chronic kidney disease    • Hypertension    • Membranous nephropathy determined by biopsy 04/24/2019   • Proteinuria        Past Surgical History:   Procedure Laterality Date   • APPENDECTOMY     • IR BIOPSY KIDNEY MASS  2/21/2019   • SHOULDER SURGERY     • TRIGGER FINGER RELEASE         Current Outpatient Medications   Medication Sig Dispense Refill   • albuterol (2 5 mg/3 mL) 0 083 % nebulizer solution Take 3 mL (2 5 mg total) by nebulization every 4 (four) hours as needed for wheezing or shortness of breath 100 mL 5   • albuterol (PROVENTIL HFA,VENTOLIN HFA) 90 mcg/act inhaler TAKE 2 PUFFS BY MOUTH EVERY 4 HOURS AS NEEDED FOR WHEEZE 6 7 g 5   • amLODIPine (NORVASC) 5 mg tablet TAKE 1 TABLET (5 MG TOTAL) BY MOUTH DAILY   90 tablet 2   • azithromycin (ZITHROMAX) 200 mg/5 mL suspension Take 12 5 mL (500 mg total) by mouth daily for 1 day, THEN 6 3 mL (250 mg total) daily for 4 days  37 7 mL 0   • cyclobenzaprine (FLEXERIL) 10 mg tablet Take 1 tablet (10 mg total) by mouth 3 (three) times a day as needed for muscle spasms 30 tablet 1   • EPINEPHrine (EPIPEN) 0 3 mg/0 3 mL SOAJ USE IN OUTER THIGH IN CASE OF AN EMERGENCY  DISP WHATEVER GENERIC EPINEPHRINE IS COVERED     • fluticasone (FLONASE) 50 mcg/act nasal spray SPRAY 1 SPRAY INTO EACH NOSTRIL EVERY DAY (Patient taking differently: As needed but every day for allergie season) 24 mL 3   • loratadine (CLARITIN) 10 mg tablet TAKE 1 TABLET BY MOUTH EVERY DAY 90 tablet 1   • methylPREDNISolone 4 MG tablet therapy pack Use as directed on package 21 each 0   • mometasone-formoterol (Dulera) 200-5 MCG/ACT inhaler Inhale 2 puffs 2 (two) times a day Rinse mouth after use  13 g 5   • Multiple Vitamin (multivitamin) capsule Take 1 capsule by mouth daily       No current facility-administered medications for this visit  Allergies   Allergen Reactions   • Molds & Smuts Shortness Of Breath and Rash   • Other Shortness Of Breath   • Shellfish Allergy - Food Allergy Hives   • Shellfish-Derived Products - Food Allergy Hives       Review of Systems   Constitutional: Negative for chills and fever  HENT: Positive for congestion, postnasal drip, rhinorrhea, sinus pressure and sinus pain  Negative for ear pain and sore throat  Bilateral ear congestion   Eyes: Negative for pain and visual disturbance  Respiratory: Positive for cough (productive)  Negative for chest tightness, shortness of breath and wheezing  Cardiovascular: Negative for chest pain, palpitations and leg swelling  Gastrointestinal: Negative for abdominal pain, constipation, diarrhea, nausea and vomiting  Endocrine: Negative for cold intolerance and heat intolerance  Genitourinary: Negative for dysuria and hematuria  Musculoskeletal: Negative for arthralgias, back pain and myalgias  Skin: Negative for color change and rash  Allergic/Immunologic: Positive for environmental allergies  Neurological: Negative for dizziness, seizures, syncope, weakness, light-headedness, numbness and headaches  Hematological: Negative for adenopathy  Psychiatric/Behavioral: Negative for confusion  The patient is not nervous/anxious  All other systems reviewed and are negative  Video Exam    Vitals:    06/09/23 0825   Temp: 98 °F (36 7 °C)       Physical Exam  Vitals reviewed: limited due to video visit  Constitutional:       General: He is not in acute distress  Appearance: Normal appearance  He is not ill-appearing  Neurological:      Mental Status: He is alert            Visit Time  Total Visit Duration: 15 minutes

## 2023-06-09 NOTE — ASSESSMENT & PLAN NOTE
Azithromycin and Medrol Dosepak were ordered to treat acute sinusitis  Patient was advised to continue using Coricidin as needed to help with cough

## 2023-06-09 NOTE — ASSESSMENT & PLAN NOTE
Patient's symptoms are currently being appropriately managed with [facility] and albuterol use  Medrol Dosepak was ordered to help with any acute airway inflammation as well

## 2023-06-11 ENCOUNTER — NURSE TRIAGE (OUTPATIENT)
Dept: OTHER | Facility: OTHER | Age: 48
End: 2023-06-11

## 2023-06-11 DIAGNOSIS — J45.40 MODERATE PERSISTENT ASTHMA, UNSPECIFIED WHETHER COMPLICATED: ICD-10-CM

## 2023-06-11 RX ORDER — ALBUTEROL SULFATE 2.5 MG/3ML
2.5 SOLUTION RESPIRATORY (INHALATION) EVERY 4 HOURS PRN
Qty: 100 ML | Refills: 0 | Status: SHIPPED | OUTPATIENT
Start: 2023-06-11

## 2023-06-11 NOTE — TELEPHONE ENCOUNTER
"  Reason for Disposition  • [1] Caller requesting a prescription renewal (no refills left), no triage required, AND [2] triager able to renew prescription per department policy    Answer Assessment - Initial Assessment Questions  1  NAME of MEDICATION: \"What medicine are you calling about? \"      Albuterol nebulizer solution    2  QUESTION: Pete Palm is your question? \" (e g , medication refill, side effect)      Refill    3  PRESCRIBING HCP: \"Who prescribed it? \" Reason: if prescribed by specialist, call should be referred to that group  Dr Alexey Elikns    4  SYMPTOMS: \"Do you have any symptoms? \"  No    Protocols used: MEDICATION QUESTION CALL-ADULT-AH    "

## 2023-06-11 NOTE — TELEPHONE ENCOUNTER
Regarding: Medication refill albuterol (2 5 mg/3 mL) 0 083 % nebulizer solution  ----- Message from Fortino Montiel sent at 6/11/2023  9:36 AM EDT -----  I am out of my medication albuterol (2 5 mg/3 mL) 0 083 % nebulizer solution and only 4 vials left and need it sent to [facility] on [address] [facility]

## 2023-07-03 DIAGNOSIS — I10 ESSENTIAL HYPERTENSION: ICD-10-CM

## 2023-07-04 RX ORDER — AMLODIPINE BESYLATE 5 MG/1
5 TABLET ORAL DAILY
Qty: 90 TABLET | Refills: 2 | Status: SHIPPED | OUTPATIENT
Start: 2023-07-04

## 2023-07-28 ENCOUNTER — APPOINTMENT (OUTPATIENT)
Dept: LAB | Facility: CLINIC | Age: 48
End: 2023-07-28
Payer: COMMERCIAL

## 2023-07-28 DIAGNOSIS — N02.2 MEMBRANOUS NEPHROPATHY DETERMINED BY BIOPSY: ICD-10-CM

## 2023-07-28 LAB
ANION GAP SERPL CALCULATED.3IONS-SCNC: 4 MMOL/L
BUN SERPL-MCNC: 13 MG/DL (ref 5–25)
CALCIUM SERPL-MCNC: 8.8 MG/DL (ref 8.3–10.1)
CHLORIDE SERPL-SCNC: 113 MMOL/L (ref 96–108)
CO2 SERPL-SCNC: 28 MMOL/L (ref 21–32)
CREAT SERPL-MCNC: 1.1 MG/DL (ref 0.6–1.3)
CREAT UR-MCNC: 82.7 MG/DL
GFR SERPL CREATININE-BSD FRML MDRD: 80 ML/MIN/1.73SQ M
GLUCOSE P FAST SERPL-MCNC: 87 MG/DL (ref 65–99)
POTASSIUM SERPL-SCNC: 3.9 MMOL/L (ref 3.5–5.3)
PROT UR-MCNC: 866 MG/DL
PROT/CREAT UR: 10.47 MG/G{CREAT} (ref 0–0.1)
SODIUM SERPL-SCNC: 145 MMOL/L (ref 135–147)

## 2023-07-28 PROCEDURE — 36415 COLL VENOUS BLD VENIPUNCTURE: CPT

## 2023-07-28 PROCEDURE — 83516 IMMUNOASSAY NONANTIBODY: CPT

## 2023-07-28 PROCEDURE — 80048 BASIC METABOLIC PNL TOTAL CA: CPT

## 2023-07-28 PROCEDURE — 82570 ASSAY OF URINE CREATININE: CPT

## 2023-07-28 PROCEDURE — 84156 ASSAY OF PROTEIN URINE: CPT

## 2023-07-31 ENCOUNTER — OFFICE VISIT (OUTPATIENT)
Dept: FAMILY MEDICINE CLINIC | Facility: CLINIC | Age: 48
End: 2023-07-31
Payer: COMMERCIAL

## 2023-07-31 VITALS
DIASTOLIC BLOOD PRESSURE: 84 MMHG | SYSTOLIC BLOOD PRESSURE: 138 MMHG | BODY MASS INDEX: 27.6 KG/M2 | HEIGHT: 62 IN | HEART RATE: 80 BPM | WEIGHT: 150 LBS

## 2023-07-31 DIAGNOSIS — E77.8 HYPOPROTEINEMIA (HCC): ICD-10-CM

## 2023-07-31 DIAGNOSIS — R97.20 PSA ELEVATION: ICD-10-CM

## 2023-07-31 DIAGNOSIS — I89.0 LYMPHEDEMA: Primary | ICD-10-CM

## 2023-07-31 DIAGNOSIS — R04.0 EPISTAXIS: ICD-10-CM

## 2023-07-31 DIAGNOSIS — I10 ESSENTIAL HYPERTENSION: ICD-10-CM

## 2023-07-31 DIAGNOSIS — R10.2 PELVIC PAIN: ICD-10-CM

## 2023-07-31 LAB — PLA2R IGG SER IA-ACNC: 32.4 RU/ML (ref 0–19.9)

## 2023-07-31 PROCEDURE — 99214 OFFICE O/P EST MOD 30 MIN: CPT | Performed by: FAMILY MEDICINE

## 2023-07-31 NOTE — PATIENT INSTRUCTIONS
Await  ent  adarsh, sent message to nephrology  about  low  serum protein, await  kidney us and  repeat psa, referral for lymphedema therapy  Weight Management   AMBULATORY CARE:   Why it is important to manage your weight:  Being overweight increases your risk of health conditions such as heart disease, high blood pressure, type 2 diabetes, and certain types of cancer. It can also increase your risk for osteoarthritis, sleep apnea, and other respiratory problems. Aim for a slow, steady weight loss. Even a small amount of weight loss can lower your risk of health problems. Risks of being overweight:  Extra weight can cause many health problems, including the following:  Diabetes (high blood sugar level)    High blood pressure or high cholesterol    Heart disease    Stroke    Gallbladder or liver disease    Cancer of the colon, breast, prostate, liver, or kidney    Sleep apnea    Arthritis or gout    Screening  is done to check for health conditions before you have signs or symptoms. If you are 28to 79years old, your blood sugar level may be checked every 3 years for signs of prediabetes or diabetes. Your healthcare provider will check your blood pressure at each visit. High blood pressure can lead to a stroke or other problems. Your provider may check for signs of heart disease, cancer, or other health problems. How to lose weight safely:  A safe and healthy way to lose weight is to eat fewer calories and get regular exercise. You can lose up about 1 pound a week by decreasing the number of calories you eat by 500 calories each day. You can decrease calories by eating smaller portion sizes or by cutting out high-calorie foods. Read labels to find out how many calories are in the foods you eat. You can also burn calories with exercise such as walking, swimming, or biking. You will be more likely to keep weight off if you make these changes part of your lifestyle.  Exercise at least 30 minutes per day on most days of the week. You can also fit in more physical activity by taking the stairs instead of the elevator or parking farther away from stores. Ask your healthcare provider about the best exercise plan for you. Healthy meal plan for weight management:  A healthy meal plan includes a variety of foods, contains fewer calories, and helps you stay healthy. A healthy meal plan includes the following:     Eat whole-grain foods more often. A healthy meal plan should contain fiber. Fiber is the part of grains, fruits, and vegetables that is not broken down by your body. Whole-grain foods are healthy and provide extra fiber in your diet. Some examples of whole-grain foods are whole-wheat breads and pastas, oatmeal, brown rice, and bulgur. Eat a variety of vegetables every day. Include dark, leafy greens such as spinach, kale, sharita greens, and mustard greens. Eat yellow and orange vegetables such as carrots, sweet potatoes, and winter squash. Eat a variety of fruits every day. Choose fresh or canned fruit (canned in its own juice or light syrup) instead of juice. Fruit juice has very little or no fiber. Eat low-fat dairy foods. Drink fat-free (skim) milk or 1% milk. Eat fat-free yogurt and low-fat cottage cheese. Try low-fat cheeses such as mozzarella and other reduced-fat cheeses. Choose meat and other protein foods that are low in fat. Choose beans or other legumes such as split peas or lentils. Choose fish, skinless poultry (chicken or turkey), or lean cuts of red meat (beef or pork). Before you cook meat or poultry, cut off any visible fat. Use less fat and oil. Try baking foods instead of frying them. Add less fat, such as margarine, sour cream, regular salad dressing and mayonnaise to foods. Eat fewer high-fat foods. Some examples of high-fat foods include french fries, doughnuts, ice cream, and cakes. Eat fewer sweets. Limit foods and drinks that are high in sugar.  This includes candy, cookies, regular soda, and sweetened drinks. Ways to decrease calories:   Eat smaller portions. Use a small plate with smaller servings. Do not eat second helpings. When you eat at a restaurant, ask for a box and place half of your meal in the box before you eat. Share an entrée with someone else. Replace high-calorie snacks with healthy, low-calorie snacks. Choose fresh fruit, vegetables, fat-free rice cakes, or air-popped popcorn instead of potato chips, nuts, or chocolate. Choose water or calorie-free drinks instead of soda or sweetened drinks. Do not shop for groceries when you are hungry. You may be more likely to make unhealthy food choices. Take a grocery list of healthy foods and shop after you have eaten. Eat regular meals. Do not skip meals. Skipping meals can lead to overeating later in the day. This can make it harder for you to lose weight. Eat a healthy snack in place of a meal if you do not have time to eat a regular meal. Talk with a dietitian to help you create a meal plan and schedule that is right for you. Other things to consider as you try to lose weight:   Be aware of situations that may give you the urge to overeat, such as eating while watching television. Find ways to avoid these situations. For example, read a book, go for a walk, or do crafts. Meet with a weight loss support group or friends who are also trying to lose weight. This may help you stay motivated to continue working on your weight loss goals. © Copyright [employer] 2022 Information is for End User's use only and may not be sold, redistributed or otherwise used for commercial purposes. The above information is an  only. It is not intended as medical advice for individual conditions or treatments. Talk to your doctor, nurse or pharmacist before following any medical regimen to see if it is safe and effective for you.

## 2023-07-31 NOTE — ASSESSMENT & PLAN NOTE
Could have some edema  From amlodipine, rec lymphedema  Therapy, elevate  Legs  And compression socks

## 2023-07-31 NOTE — PROGRESS NOTES
Name: Selena Judge      : 1975      MRN: [id number]  Encounter Provider: Parisa Brush MD  Encounter Date: 2023   Encounter department: [facility] PRIMARY CARE    Assessment & Plan     1. Lymphedema  Assessment & Plan:  Could have some edema  From amlodipine, rec lymphedema  Therapy, elevate  Legs  And compression socks    Orders:  -     Ambulatory Referral to Physical Therapy; Future    2. Pelvic pain  Assessment & Plan:  rec  Kidney. bladder us, baseline psa  Slightly  Hig, repeat  10/23    Orders:  -     US kidney and bladder; Future; Expected date: 2023    3. Epistaxis  -     Ambulatory Referral to Otolaryngology; Future    4. PSA elevation  -     PSA, Total Screen; Future; Expected date: 10/31/2023    5. Essential hypertension  Assessment & Plan:  BP stable      6. BMI 27.0-27.9,adult    7. Hypoproteinemia (HCC)  Assessment & Plan:  Urine protein high, serum protein low, needs to drink protein shakes           Subjective      Having some edema  When on feet  For  Too long, starting to get varicose  Veins,having some pelvic  Pressure, no  Urinary sx  Except "after-drips", psa  At baseline is  2.2 which is higher michelle xpected at 47, no fh  Prostate  issues having nasal swelling-saw  ent  And allergist, too much swelling  To even do nasopharyngoscopy, chronic nasal congestion, does get bloody noses on l which is more congested, urine protein was up    Review of Systems   Constitutional: Negative for activity change, appetite change and fatigue. Respiratory: Negative for shortness of breath. Cardiovascular: Positive for leg swelling. Negative for chest pain. Genitourinary:        Pelvic pain   Neurological: Negative for dizziness and headaches. Hematological: Negative for adenopathy. Psychiatric/Behavioral: The patient is not nervous/anxious.         Current Outpatient Medications on File Prior to Visit   Medication Sig   • albuterol (2.5 mg/3 mL) 0.083 % nebulizer solution Take 3 mL (2.5 mg total) by nebulization every 4 (four) hours as needed for wheezing or shortness of breath   • albuterol (PROVENTIL HFA,VENTOLIN HFA) 90 mcg/act inhaler TAKE 2 PUFFS BY MOUTH EVERY 4 HOURS AS NEEDED FOR WHEEZE   • amLODIPine (NORVASC) 5 mg tablet Take 1 tablet (5 mg total) by mouth daily   • cyclobenzaprine (FLEXERIL) 10 mg tablet Take 1 tablet (10 mg total) by mouth 3 (three) times a day as needed for muscle spasms   • EPINEPHrine (EPIPEN) 0.3 mg/0.3 mL SOAJ USE IN OUTER THIGH IN CASE OF AN EMERGENCY. DISP WHATEVER GENERIC EPINEPHRINE IS COVERED   • fluticasone (FLONASE) 50 mcg/act nasal spray SPRAY 1 SPRAY INTO EACH NOSTRIL EVERY DAY (Patient taking differently: As needed but every day for allergie season)   • loratadine (CLARITIN) 10 mg tablet TAKE 1 TABLET BY MOUTH EVERY DAY   • mometasone-formoterol (Dulera) 200-5 MCG/ACT inhaler Inhale 2 puffs 2 (two) times a day Rinse mouth after use. • Multiple Vitamin (multivitamin) capsule Take 1 capsule by mouth daily   • [DISCONTINUED] methylPREDNISolone 4 MG tablet therapy pack Use as directed on package       Objective     /84   Pulse 80   Ht 5' 2" (1.575 m)   Wt 68 kg (150 lb)   BMI 27.44 kg/m²     Physical Exam  Vitals reviewed. Constitutional:       Appearance: Normal appearance. Cardiovascular:      Rate and Rhythm: Normal rate and regular rhythm. Pulses: Normal pulses. Heart sounds: Normal heart sounds. Pulmonary:      Effort: Pulmonary effort is normal.      Breath sounds: Normal breath sounds. Musculoskeletal:      Right lower leg: Edema present. Left lower leg: Edema present. Comments: Trace edema, bilat  Small varicose  veins   Lymphadenopathy:      Cervical: No cervical adenopathy. Neurological:      Mental Status: He is alert. Sarah Sorenson MD  BMI Counseling: Body mass index is 27.44 kg/m².  The BMI is above normal. Nutrition recommendations include reducing portion sizes, decreasing overall calorie intake, 3-5 servings of fruits/vegetables daily, reducing fast food intake and consuming healthier snacks. Exercise recommendations include exercising 3-5 times per week.

## 2023-08-01 NOTE — PROGRESS NOTES
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 Also his phospholipase receptor antibodies are positive. I contacted him left a voicemail to call me back or I will call him later if I do not hear from him likely going to reassume cyclosporine and hopefully will go back into a remission.

## 2023-08-03 DIAGNOSIS — N04.2 NEPHROTIC SYNDROME WITH LESION OF MEMBRANOUS GLOMERULONEPHRITIS: Primary | ICD-10-CM

## 2023-08-03 DIAGNOSIS — R60.9 EDEMA, UNSPECIFIED TYPE: ICD-10-CM

## 2023-08-03 RX ORDER — CYCLOSPORINE 100 MG/1
100 CAPSULE, LIQUID FILLED ORAL EVERY 12 HOURS SCHEDULED
Qty: 60 CAPSULE | Refills: 5 | Status: SHIPPED | OUTPATIENT
Start: 2023-08-03 | End: 2023-08-14 | Stop reason: CLARIF

## 2023-08-03 RX ORDER — TORSEMIDE 10 MG/1
10 TABLET ORAL DAILY
Qty: 30 TABLET | Refills: 5 | Status: SHIPPED | OUTPATIENT
Start: 2023-08-03

## 2023-08-03 NOTE — PROGRESS NOTES
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 We will place the patient back on cyclosporine and I we will also give him prescription for edema. He is aware through communication personally. I will then contact him regarding next for follow-up labs and monitoring of his swelling. In the past he went into remission so I think we can try the calcineurin inhibitors to try and reduce proteinuria and symptoms especially since its been a long time in remission. Eventually could consider rituximab course but would discuss with patient if does not respond to above treatment.

## 2023-08-08 PROBLEM — J01.40 ACUTE NON-RECURRENT PANSINUSITIS: Status: RESOLVED | Noted: 2023-06-09 | Resolved: 2023-08-08

## 2023-08-09 ENCOUNTER — HOSPITAL ENCOUNTER (OUTPATIENT)
Dept: ULTRASOUND IMAGING | Facility: HOSPITAL | Age: 48
Discharge: HOME | End: 2023-08-09
Payer: COMMERCIAL

## 2023-08-09 DIAGNOSIS — R10.2 PELVIC PAIN: ICD-10-CM

## 2023-08-09 PROCEDURE — 76775 US EXAM ABDO BACK WALL LIM: CPT

## 2023-08-14 ENCOUNTER — TELEPHONE (OUTPATIENT)
Dept: NEPHROLOGY | Facility: CLINIC | Age: 48
End: 2023-08-14

## 2023-08-14 DIAGNOSIS — N05.2 MEMBRANOUS GLOMERULONEPHRITIS: Primary | ICD-10-CM

## 2023-08-14 RX ORDER — CYCLOSPORINE 100 MG/ML
100 SOLUTION ORAL EVERY 12 HOURS
Qty: 50 ML | Refills: 5 | Status: SHIPPED | OUTPATIENT
Start: 2023-08-14 | End: 2023-08-25 | Stop reason: SDUPTHER

## 2023-08-14 NOTE — TELEPHONE ENCOUNTER
[facility] called and stated the patient is requesting that his Cyclosporine is prescribed as a liquid instead of a capsule.

## 2023-08-23 ENCOUNTER — TELEPHONE (OUTPATIENT)
Dept: NEPHROLOGY | Facility: CLINIC | Age: 48
End: 2023-08-23

## 2023-08-23 NOTE — TELEPHONE ENCOUNTER
Pt called and stated his legs have been swollen for the last two weeks but said they started to become painful yesterday while he was at work. He thinks they were slightly more swollen. He said the swelling is from the ankles to the calves. Pt wants to know if Dr. Luis Blandon could give him a call.

## 2023-08-24 ENCOUNTER — TELEPHONE (OUTPATIENT)
Dept: FAMILY MEDICINE CLINIC | Facility: CLINIC | Age: 48
End: 2023-08-24

## 2023-08-24 NOTE — TELEPHONE ENCOUNTER
Patient called in and asked if dr. Blaze Ramirez can give him a call back, he has a few questions to ask her. Patient did not go into details about the questions just asked for a call back.  Please advise thank you

## 2023-08-25 DIAGNOSIS — N05.2 MEMBRANOUS GLOMERULONEPHRITIS: ICD-10-CM

## 2023-08-25 DIAGNOSIS — I89.0 LYMPHEDEMA: Primary | ICD-10-CM

## 2023-08-25 DIAGNOSIS — N02.2 MEMBRANOUS NEPHROPATHY DETERMINED BY BIOPSY: ICD-10-CM

## 2023-08-25 DIAGNOSIS — E77.8 HYPOPROTEINEMIA (HCC): ICD-10-CM

## 2023-08-25 RX ORDER — CYCLOSPORINE 100 MG/ML
100 SOLUTION ORAL EVERY 12 HOURS
Qty: 50 ML | Refills: 5 | Status: SHIPPED | OUTPATIENT
Start: 2023-08-25

## 2023-08-26 DIAGNOSIS — R60.9 EDEMA, UNSPECIFIED TYPE: ICD-10-CM

## 2023-08-27 RX ORDER — TORSEMIDE 10 MG/1
10 TABLET ORAL DAILY
Qty: 90 TABLET | Refills: 2 | Status: SHIPPED | OUTPATIENT
Start: 2023-08-27

## 2023-08-29 DIAGNOSIS — N02.2 MEMBRANOUS NEPHROPATHY DETERMINED BY BIOPSY: Primary | ICD-10-CM

## 2023-08-29 DIAGNOSIS — I10 ESSENTIAL HYPERTENSION: ICD-10-CM

## 2023-08-29 DIAGNOSIS — R80.8 OTHER PROTEINURIA: ICD-10-CM

## 2023-08-29 NOTE — TELEPHONE ENCOUNTER
Patient is aware and would like to do labs. No further questions at this time. Labs have been ordered for mid September and patient is aware.

## 2023-08-30 DIAGNOSIS — J45.40 MODERATE PERSISTENT ASTHMA, UNSPECIFIED WHETHER COMPLICATED: ICD-10-CM

## 2023-08-30 RX ORDER — MOMETASONE FUROATE AND FORMOTEROL FUMARATE DIHYDRATE 200; 5 UG/1; UG/1
AEROSOL RESPIRATORY (INHALATION)
Qty: 13 G | Refills: 5 | Status: SHIPPED | OUTPATIENT
Start: 2023-08-30

## 2023-09-05 DIAGNOSIS — N04.9 NEPHROTIC SYNDROME: ICD-10-CM

## 2023-09-05 DIAGNOSIS — N04.9 NEPHROTIC SYNDROME: Primary | ICD-10-CM

## 2023-09-05 DIAGNOSIS — N05.9 NEPHRITIC SYNDROME: Primary | ICD-10-CM

## 2023-09-05 RX ORDER — TACROLIMUS 1 MG/1
1 CAPSULE ORAL EVERY 12 HOURS SCHEDULED
Qty: 60 CAPSULE | Refills: 5 | Status: SHIPPED | OUTPATIENT
Start: 2023-09-05

## 2023-09-05 NOTE — PROGRESS NOTES
The pharmacy could not get liquid cyclosporine. Will start tacrolimus 1 mg q 12 hours and check labs in 2 weeks. Patient aware and can hopefully swallow pills.

## 2023-09-06 ENCOUNTER — APPOINTMENT (OUTPATIENT)
Dept: LAB | Facility: CLINIC | Age: 48
End: 2023-09-06
Payer: COMMERCIAL

## 2023-09-06 DIAGNOSIS — N04.9 NEPHROTIC SYNDROME: ICD-10-CM

## 2023-09-06 LAB
ALBUMIN SERPL BCP-MCNC: 2.4 G/DL (ref 3.5–5)
ALP SERPL-CCNC: 41 U/L (ref 34–104)
ALT SERPL W P-5'-P-CCNC: 27 U/L (ref 7–52)
ANION GAP SERPL CALCULATED.3IONS-SCNC: 6 MMOL/L
AST SERPL W P-5'-P-CCNC: 31 U/L (ref 13–39)
BILIRUB SERPL-MCNC: 0.32 MG/DL (ref 0.2–1)
BUN SERPL-MCNC: 13 MG/DL (ref 5–25)
CALCIUM ALBUM COR SERPL-MCNC: 9.7 MG/DL (ref 8.3–10.1)
CALCIUM SERPL-MCNC: 8.4 MG/DL (ref 8.4–10.2)
CHLORIDE SERPL-SCNC: 109 MMOL/L (ref 96–108)
CO2 SERPL-SCNC: 26 MMOL/L (ref 21–32)
CREAT SERPL-MCNC: 1.07 MG/DL (ref 0.6–1.3)
CREAT UR-MCNC: 137.8 MG/DL
GFR SERPL CREATININE-BSD FRML MDRD: 83 ML/MIN/1.73SQ M
GLUCOSE P FAST SERPL-MCNC: 70 MG/DL (ref 65–99)
POTASSIUM SERPL-SCNC: 3.6 MMOL/L (ref 3.5–5.3)
PROT SERPL-MCNC: 4.6 G/DL (ref 6.4–8.4)
PROT UR-MCNC: 838 MG/DL
PROT/CREAT UR: 6.08 MG/G{CREAT} (ref 0–0.1)
SODIUM SERPL-SCNC: 141 MMOL/L (ref 135–147)
TACROLIMUS BLD-MCNC: <1 NG/ML (ref 3–15)

## 2023-09-06 PROCEDURE — 36415 COLL VENOUS BLD VENIPUNCTURE: CPT

## 2023-09-06 PROCEDURE — 84156 ASSAY OF PROTEIN URINE: CPT | Performed by: INTERNAL MEDICINE

## 2023-09-06 PROCEDURE — 82570 ASSAY OF URINE CREATININE: CPT | Performed by: INTERNAL MEDICINE

## 2023-09-06 PROCEDURE — 80197 ASSAY OF TACROLIMUS: CPT

## 2023-09-06 PROCEDURE — 80053 COMPREHEN METABOLIC PANEL: CPT

## 2023-09-08 ENCOUNTER — TELEPHONE (OUTPATIENT)
Dept: NEPHROLOGY | Facility: CLINIC | Age: 48
End: 2023-09-08

## 2023-09-08 DIAGNOSIS — N04.9 NEPHROTIC SYNDROME: Primary | ICD-10-CM

## 2023-09-08 NOTE — TELEPHONE ENCOUNTER
----- Message from Shaun Fofana MD sent at 9/8/2023 10:07 AM EDT -----  Let him know kidney function still normal and protein in urine starting to go down as was 6 grams. Starting tacrolimus. Tell him I put in labs to do before visit in October so do them a week before appointment. Let me know he got message.

## 2023-09-08 NOTE — TELEPHONE ENCOUNTER
Called patient and left a voicemail asking the patient to please call the office to go over lab results.

## 2023-09-08 NOTE — TELEPHONE ENCOUNTER
Received a call from patient stating that received a voicemail to call the office for his lab results. I relay Dr. López Gain message above. No further questions at this time.

## 2023-09-28 DIAGNOSIS — N04.9 NEPHROTIC SYNDROME: ICD-10-CM

## 2023-09-28 RX ORDER — TACROLIMUS 1 MG/1
1 CAPSULE ORAL EVERY 12 HOURS SCHEDULED
Qty: 180 CAPSULE | Refills: 2 | Status: SHIPPED | OUTPATIENT
Start: 2023-09-28

## 2023-10-05 ENCOUNTER — VBI (OUTPATIENT)
Dept: ADMINISTRATIVE | Facility: OTHER | Age: 48
End: 2023-10-05

## 2023-10-11 ENCOUNTER — APPOINTMENT (OUTPATIENT)
Dept: LAB | Facility: CLINIC | Age: 48
End: 2023-10-11
Payer: COMMERCIAL

## 2023-10-11 DIAGNOSIS — N02.2 MEMBRANOUS NEPHROPATHY DETERMINED BY BIOPSY: ICD-10-CM

## 2023-10-11 DIAGNOSIS — R97.20 PSA ELEVATION: ICD-10-CM

## 2023-10-11 DIAGNOSIS — R80.8 OTHER PROTEINURIA: ICD-10-CM

## 2023-10-11 DIAGNOSIS — I10 ESSENTIAL HYPERTENSION: ICD-10-CM

## 2023-10-11 DIAGNOSIS — N04.9 NEPHROTIC SYNDROME: ICD-10-CM

## 2023-10-11 LAB
ANION GAP SERPL CALCULATED.3IONS-SCNC: 4 MMOL/L
BUN SERPL-MCNC: 15 MG/DL (ref 5–25)
CALCIUM SERPL-MCNC: 8.4 MG/DL (ref 8.4–10.2)
CHLORIDE SERPL-SCNC: 108 MMOL/L (ref 96–108)
CO2 SERPL-SCNC: 28 MMOL/L (ref 21–32)
CREAT SERPL-MCNC: 1.11 MG/DL (ref 0.6–1.3)
CREAT UR-MCNC: 132.5 MG/DL
GFR SERPL CREATININE-BSD FRML MDRD: 79 ML/MIN/1.73SQ M
GLUCOSE P FAST SERPL-MCNC: 79 MG/DL (ref 65–99)
POTASSIUM SERPL-SCNC: 3.8 MMOL/L (ref 3.5–5.3)
PROT UR-MCNC: 579 MG/DL
PROT/CREAT UR: 4.37 MG/G{CREAT} (ref 0–0.1)
SODIUM SERPL-SCNC: 140 MMOL/L (ref 135–147)
TACROLIMUS BLD-MCNC: <1 NG/ML (ref 3–15)

## 2023-10-11 PROCEDURE — 80197 ASSAY OF TACROLIMUS: CPT

## 2023-10-11 PROCEDURE — 84156 ASSAY OF PROTEIN URINE: CPT

## 2023-10-11 PROCEDURE — 36415 COLL VENOUS BLD VENIPUNCTURE: CPT

## 2023-10-11 PROCEDURE — 80048 BASIC METABOLIC PNL TOTAL CA: CPT

## 2023-10-11 PROCEDURE — 82570 ASSAY OF URINE CREATININE: CPT

## 2023-11-01 ENCOUNTER — TRANSCRIBE ORDERS (OUTPATIENT)
Dept: LAB | Facility: CLINIC | Age: 48
End: 2023-11-01

## 2023-11-01 ENCOUNTER — APPOINTMENT (OUTPATIENT)
Dept: LAB | Facility: CLINIC | Age: 48
End: 2023-11-01
Payer: COMMERCIAL

## 2023-11-01 ENCOUNTER — TRANSCRIBE ORDERS (OUTPATIENT)
Dept: LAB | Age: 48
End: 2023-11-01

## 2023-11-01 DIAGNOSIS — Z79.899 ENCOUNTER FOR LONG-TERM (CURRENT) USE OF OTHER MEDICATIONS: ICD-10-CM

## 2023-11-01 DIAGNOSIS — Z79.899 ENCOUNTER FOR LONG-TERM (CURRENT) USE OF OTHER MEDICATIONS: Primary | ICD-10-CM

## 2023-11-01 LAB — PSA SERPL-MCNC: 2.46 NG/ML (ref 0–4)

## 2023-11-01 PROCEDURE — 80197 ASSAY OF TACROLIMUS: CPT

## 2023-11-01 PROCEDURE — 36415 COLL VENOUS BLD VENIPUNCTURE: CPT

## 2023-11-01 PROCEDURE — G0103 PSA SCREENING: HCPCS

## 2023-11-02 LAB — TACROLIMUS BLD-MCNC: 1.9 NG/ML (ref 3–15)

## 2023-11-09 ENCOUNTER — OFFICE VISIT (OUTPATIENT)
Dept: FAMILY MEDICINE CLINIC | Facility: CLINIC | Age: 48
End: 2023-11-09
Payer: COMMERCIAL

## 2023-11-09 VITALS
TEMPERATURE: 98.1 F | SYSTOLIC BLOOD PRESSURE: 134 MMHG | BODY MASS INDEX: 28.23 KG/M2 | DIASTOLIC BLOOD PRESSURE: 70 MMHG | HEART RATE: 84 BPM | HEIGHT: 62 IN | WEIGHT: 153.4 LBS

## 2023-11-09 DIAGNOSIS — G89.29 CHRONIC BILATERAL LOW BACK PAIN WITHOUT SCIATICA: ICD-10-CM

## 2023-11-09 DIAGNOSIS — M54.50 CHRONIC BILATERAL LOW BACK PAIN WITHOUT SCIATICA: ICD-10-CM

## 2023-11-09 DIAGNOSIS — J45.40 MODERATE PERSISTENT ASTHMA WITHOUT COMPLICATION: Primary | ICD-10-CM

## 2023-11-09 DIAGNOSIS — J45.40 MODERATE PERSISTENT ASTHMA, UNSPECIFIED WHETHER COMPLICATED: ICD-10-CM

## 2023-11-09 DIAGNOSIS — R80.8 OTHER PROTEINURIA: ICD-10-CM

## 2023-11-09 DIAGNOSIS — N02.2 MEMBRANOUS NEPHROPATHY DETERMINED BY BIOPSY: ICD-10-CM

## 2023-11-09 DIAGNOSIS — I10 ESSENTIAL HYPERTENSION: ICD-10-CM

## 2023-11-09 PROBLEM — I89.0 LYMPHEDEMA: Status: RESOLVED | Noted: 2023-07-31 | Resolved: 2023-11-09

## 2023-11-09 PROBLEM — R04.0 EPISTAXIS: Status: RESOLVED | Noted: 2023-07-31 | Resolved: 2023-11-09

## 2023-11-09 PROCEDURE — 99214 OFFICE O/P EST MOD 30 MIN: CPT | Performed by: FAMILY MEDICINE

## 2023-11-09 RX ORDER — CYCLOBENZAPRINE HCL 10 MG
10 TABLET ORAL 3 TIMES DAILY PRN
Qty: 30 TABLET | Refills: 1 | Status: SHIPPED | OUTPATIENT
Start: 2023-11-09

## 2023-11-09 RX ORDER — ALBUTEROL SULFATE 2.5 MG/3ML
2.5 SOLUTION RESPIRATORY (INHALATION) EVERY 4 HOURS PRN
Qty: 100 ML | Refills: 0 | Status: SHIPPED | OUTPATIENT
Start: 2023-11-09

## 2023-11-09 NOTE — PROGRESS NOTES
Name: Lala Current      : 1975      MRN: [id number]  Encounter Provider: Cali Brunner MD  Encounter Date: 2023   Encounter department: [facility] PRIMARY CARE    Assessment & Plan     1. Moderate persistent asthma without complication  Assessment & Plan:  Stable on meds      2. Essential hypertension  Assessment & Plan:  BP stable      3. Membranous nephropathy determined by biopsy  Assessment & Plan:  Sees nephrology      4. Other proteinuria  Assessment & Plan:  Improved on tacrolimus      5. Chronic bilateral low back pain without sciatica  Assessment & Plan:  Stable  with prn Flexeril    Orders:  -     cyclobenzaprine (FLEXERIL) 10 mg tablet; Take 1 tablet (10 mg total) by mouth 3 (three) times a day as needed for muscle spasms    6. Moderate persistent asthma, unspecified whether complicated  Assessment & Plan:  Stable on meds    Orders:  -     albuterol (2.5 mg/3 mL) 0.083 % nebulizer solution; Take 3 mL (2.5 mg total) by nebulization every 4 (four) hours as needed for wheezing or shortness of breath           Subjective      Here for  recheck  bp, albumin on lab slightly  low as well as  calcium -will increase  non dairy  and  non tree  nut sources  since  lactose  intolerant and  tree  nut  allergic, swelling  /protein in urine better  with change  from amlodipine  to Valsartan  and starting of  tacrolimus, needs refill on muscle relaxant he  takes as needed for back pain,      Review of Systems   Constitutional:  Negative for activity change, appetite change and fatigue. Respiratory:  Negative for shortness of breath. Cardiovascular:  Negative for chest pain and leg swelling. Neurological:  Negative for dizziness, light-headedness and headaches. Psychiatric/Behavioral:  The patient is not nervous/anxious.         Current Outpatient Medications on File Prior to Visit   Medication Sig    albuterol (PROVENTIL HFA,VENTOLIN HFA) 90 mcg/act inhaler TAKE 2 PUFFS BY MOUTH EVERY 4 HOURS AS NEEDED FOR WHEEZE    amLODIPine (NORVASC) 5 mg tablet Take 1 tablet (5 mg total) by mouth daily    Azelastine-Fluticasone 137-50 MCG/ACT SUSP 1 spray into each nostril 2 (two) times a day    Dulera 200-5 MCG/ACT inhaler INHALE 2 PUFFS BY MOUTH 2 TIMES A DAY RINSE MOUTH AFTER USE. EPINEPHrine (EPIPEN) 0.3 mg/0.3 mL SOAJ USE IN OUTER THIGH IN CASE OF AN EMERGENCY. DISP WHATEVER GENERIC EPINEPHRINE IS COVERED    loratadine (CLARITIN) 10 mg tablet TAKE 1 TABLET BY MOUTH EVERY DAY    Multiple Vitamin (multivitamin) capsule Take 1 capsule by mouth daily    tacrolimus (PROGRAF) 1 mg capsule TAKE 1 CAPSULE (1 MG TOTAL) BY MOUTH EVERY 12 (TWELVE) HOURS    torsemide (DEMADEX) 10 mg tablet TAKE 1 TABLET BY MOUTH EVERY DAY    [DISCONTINUED] albuterol (2.5 mg/3 mL) 0.083 % nebulizer solution Take 3 mL (2.5 mg total) by nebulization every 4 (four) hours as needed for wheezing or shortness of breath    [DISCONTINUED] cyclobenzaprine (FLEXERIL) 10 mg tablet Take 1 tablet (10 mg total) by mouth 3 (three) times a day as needed for muscle spasms    mupirocin (BACTROBAN) 2 % ointment Apply topically 3 (three) times a day To b/l nasal cavity for one week (Patient not taking: Reported on 11/9/2023)       Objective     /70 (BP Location: Right arm, Patient Position: Sitting, Cuff Size: Standard)   Pulse 84   Temp 98.1 °F (36.7 °C) (Temporal)   Ht 5' 2" (1.575 m)   Wt 69.6 kg (153 lb 6.4 oz)   BMI 28.06 kg/m²     Physical Exam  Vitals reviewed. Constitutional:       Appearance: Normal appearance. Neck:      Vascular: No carotid bruit. Cardiovascular:      Rate and Rhythm: Normal rate and regular rhythm. Pulses: Normal pulses. Heart sounds: Normal heart sounds. Pulmonary:      Effort: Pulmonary effort is normal.      Breath sounds: Normal breath sounds. Musculoskeletal:      Right lower leg: No edema. Left lower leg: No edema. Lymphadenopathy:      Cervical: No cervical adenopathy.    Neurological: Mental Status: He is alert.    Psychiatric:         Mood and Affect: Mood normal.       Ankit Reese MD

## 2023-11-09 NOTE — PATIENT INSTRUCTIONS
Await  freanna  from kidney  doc, BP stable on Valsartan  1/2  tab per  day, refill muscle  relaxant  and  nebulizer  med

## 2023-11-13 ENCOUNTER — APPOINTMENT (OUTPATIENT)
Dept: LAB | Facility: CLINIC | Age: 48
End: 2023-11-13
Payer: COMMERCIAL

## 2023-11-13 DIAGNOSIS — Z79.899 ENCOUNTER FOR LONG-TERM (CURRENT) USE OF OTHER MEDICATIONS: ICD-10-CM

## 2023-11-13 LAB — TACROLIMUS BLD-MCNC: 10.1 NG/ML (ref 3–15)

## 2023-11-13 PROCEDURE — 80197 ASSAY OF TACROLIMUS: CPT

## 2023-11-13 PROCEDURE — 36415 COLL VENOUS BLD VENIPUNCTURE: CPT

## 2023-11-18 ENCOUNTER — TRANSCRIBE ORDERS (OUTPATIENT)
Dept: LAB | Facility: CLINIC | Age: 48
End: 2023-11-18

## 2023-11-18 ENCOUNTER — APPOINTMENT (OUTPATIENT)
Dept: LAB | Facility: CLINIC | Age: 48
End: 2023-11-18
Payer: COMMERCIAL

## 2023-11-18 DIAGNOSIS — Z79.899 ENCOUNTER FOR LONG-TERM (CURRENT) USE OF OTHER MEDICATIONS: ICD-10-CM

## 2023-11-18 DIAGNOSIS — Z79.899 ENCOUNTER FOR LONG-TERM (CURRENT) USE OF OTHER MEDICATIONS: Primary | ICD-10-CM

## 2023-11-18 PROCEDURE — 80197 ASSAY OF TACROLIMUS: CPT

## 2023-11-18 PROCEDURE — 36415 COLL VENOUS BLD VENIPUNCTURE: CPT

## 2023-11-19 LAB — TACROLIMUS BLD-MCNC: 4.1 NG/ML (ref 3–15)

## 2023-11-27 DIAGNOSIS — J45.40 MODERATE PERSISTENT ASTHMA, UNSPECIFIED WHETHER COMPLICATED: ICD-10-CM

## 2023-11-27 RX ORDER — ALBUTEROL SULFATE 90 UG/1
AEROSOL, METERED RESPIRATORY (INHALATION)
Qty: 18 G | Refills: 1 | Status: SHIPPED | OUTPATIENT
Start: 2023-11-27

## 2023-11-28 NOTE — TELEPHONE ENCOUNTER
DISCHARGE DATE: ??2023  
  
PRIMARY DIAGNOSES:  
1.? Intrauterine pregnancy at 40 6/7 weeks.  
2.? Post dates.  
3.? Advanced maternal age.  
4.? Hypothyroidism.  
5.? Recurrent variable deceleration.  
  
PROCEDURE:?  section.  
  
HOSPITAL COURSE:? As expected.? Please see chart for full details.?   
  
LABORATORY DATA:? Please see chart.  
  
COMPLICATIONS:? None.  
  
DISCHARGE CONDITION:? Stable.  
  
CONSULTATION:? Anesthesia.  
  
DISCHARGE INSTRUCTIONS:  
1.????????????????? Diet:? Regular.  
2.????????????????? Medications:  
a.?????? Percocet 5/325 one to two p.o. every 4-6 hours p.r.n. pain.  
b.????? Motrin 800 one p.o. every 8 hours p.r.n. pain.  
3.????????????????? Followup in one week.  
Restrictions:? Pelvic rest for 6 weeks.? No heavy lifting.? May drive when pain 
free and no longer on narcotics.  
 MTDD lmtcb

## 2023-12-04 ENCOUNTER — TRANSCRIBE ORDERS (OUTPATIENT)
Dept: LAB | Facility: CLINIC | Age: 48
End: 2023-12-04

## 2023-12-04 ENCOUNTER — APPOINTMENT (OUTPATIENT)
Dept: LAB | Facility: CLINIC | Age: 48
End: 2023-12-04
Payer: COMMERCIAL

## 2023-12-04 DIAGNOSIS — Z79.899 ENCOUNTER FOR LONG-TERM (CURRENT) USE OF OTHER MEDICATIONS: Primary | ICD-10-CM

## 2023-12-04 DIAGNOSIS — Z79.899 ENCOUNTER FOR LONG-TERM (CURRENT) USE OF OTHER MEDICATIONS: ICD-10-CM

## 2023-12-04 LAB — TACROLIMUS BLD-MCNC: 3.8 NG/ML (ref 3–15)

## 2023-12-04 PROCEDURE — 80197 ASSAY OF TACROLIMUS: CPT

## 2023-12-04 PROCEDURE — 36415 COLL VENOUS BLD VENIPUNCTURE: CPT

## 2024-01-03 ENCOUNTER — VBI (OUTPATIENT)
Dept: ADMINISTRATIVE | Facility: OTHER | Age: 49
End: 2024-01-03

## 2024-01-22 ENCOUNTER — TRANSCRIBE ORDERS (OUTPATIENT)
Dept: LAB | Facility: CLINIC | Age: 49
End: 2024-01-22

## 2024-01-22 ENCOUNTER — APPOINTMENT (OUTPATIENT)
Dept: LAB | Facility: CLINIC | Age: 49
End: 2024-01-22
Payer: COMMERCIAL

## 2024-01-22 DIAGNOSIS — N04.21 PRIMARY MEMBRANOUS NEPHROPATHY WITH NEPHROTIC SYNDROME: Primary | ICD-10-CM

## 2024-01-22 DIAGNOSIS — N04.21 PRIMARY MEMBRANOUS NEPHROPATHY WITH NEPHROTIC SYNDROME: ICD-10-CM

## 2024-01-22 LAB
ALBUMIN SERPL BCP-MCNC: 2.6 G/DL (ref 3.5–5)
ANION GAP SERPL CALCULATED.3IONS-SCNC: 5 MMOL/L
BASOPHILS # BLD AUTO: 0.02 THOUSANDS/ÂΜL (ref 0–0.1)
BASOPHILS NFR BLD AUTO: 0 % (ref 0–1)
BUN SERPL-MCNC: 12 MG/DL (ref 5–25)
CALCIUM ALBUM COR SERPL-MCNC: 9.2 MG/DL (ref 8.3–10.1)
CALCIUM SERPL-MCNC: 8.1 MG/DL (ref 8.4–10.2)
CHLORIDE SERPL-SCNC: 108 MMOL/L (ref 96–108)
CO2 SERPL-SCNC: 27 MMOL/L (ref 21–32)
CREAT SERPL-MCNC: 1.18 MG/DL (ref 0.6–1.3)
CREAT UR-MCNC: 112 MG/DL
EOSINOPHIL # BLD AUTO: 0.13 THOUSAND/ÂΜL (ref 0–0.61)
EOSINOPHIL NFR BLD AUTO: 3 % (ref 0–6)
ERYTHROCYTE [DISTWIDTH] IN BLOOD BY AUTOMATED COUNT: 12 % (ref 11.6–15.1)
GFR SERPL CREATININE-BSD FRML MDRD: 73 ML/MIN/1.73SQ M
GLUCOSE P FAST SERPL-MCNC: 79 MG/DL (ref 65–99)
HCT VFR BLD AUTO: 41 % (ref 36.5–49.3)
HGB BLD-MCNC: 13.6 G/DL (ref 12–17)
IMM GRANULOCYTES # BLD AUTO: 0.02 THOUSAND/UL (ref 0–0.2)
IMM GRANULOCYTES NFR BLD AUTO: 0 % (ref 0–2)
LYMPHOCYTES # BLD AUTO: 1.98 THOUSANDS/ÂΜL (ref 0.6–4.47)
LYMPHOCYTES NFR BLD AUTO: 41 % (ref 14–44)
MCH RBC QN AUTO: 28.3 PG (ref 26.8–34.3)
MCHC RBC AUTO-ENTMCNC: 33.2 G/DL (ref 31.4–37.4)
MCV RBC AUTO: 85 FL (ref 82–98)
MONOCYTES # BLD AUTO: 0.4 THOUSAND/ÂΜL (ref 0.17–1.22)
MONOCYTES NFR BLD AUTO: 8 % (ref 4–12)
NEUTROPHILS # BLD AUTO: 2.34 THOUSANDS/ÂΜL (ref 1.85–7.62)
NEUTS SEG NFR BLD AUTO: 48 % (ref 43–75)
NRBC BLD AUTO-RTO: 0 /100 WBCS
PHOSPHATE SERPL-MCNC: 3 MG/DL (ref 2.7–4.5)
PLATELET # BLD AUTO: 199 THOUSANDS/UL (ref 149–390)
PMV BLD AUTO: 11.8 FL (ref 8.9–12.7)
POTASSIUM SERPL-SCNC: 4.3 MMOL/L (ref 3.5–5.3)
PROT UR-MCNC: 851 MG/DL
PROT/CREAT UR: 7.6 MG/G{CREAT} (ref 0–0.1)
RBC # BLD AUTO: 4.8 MILLION/UL (ref 3.88–5.62)
SODIUM SERPL-SCNC: 140 MMOL/L (ref 135–147)
WBC # BLD AUTO: 4.89 THOUSAND/UL (ref 4.31–10.16)

## 2024-01-22 PROCEDURE — 83516 IMMUNOASSAY NONANTIBODY: CPT

## 2024-01-22 PROCEDURE — 36415 COLL VENOUS BLD VENIPUNCTURE: CPT

## 2024-01-22 PROCEDURE — 82570 ASSAY OF URINE CREATININE: CPT

## 2024-01-22 PROCEDURE — 80069 RENAL FUNCTION PANEL: CPT

## 2024-01-22 PROCEDURE — 85025 COMPLETE CBC W/AUTO DIFF WBC: CPT

## 2024-01-22 PROCEDURE — 80197 ASSAY OF TACROLIMUS: CPT

## 2024-01-22 PROCEDURE — 84156 ASSAY OF PROTEIN URINE: CPT

## 2024-01-23 LAB — TACROLIMUS BLD-MCNC: 5.3 NG/ML (ref 3–15)

## 2024-01-24 LAB — PLA2R IGG SER IA-ACNC: 20.6 RU/ML (ref 0–19.9)

## 2024-01-26 ENCOUNTER — APPOINTMENT (OUTPATIENT)
Dept: LAB | Facility: CLINIC | Age: 49
End: 2024-01-26
Payer: COMMERCIAL

## 2024-01-26 DIAGNOSIS — N04.21 PRIMARY MEMBRANOUS NEPHROPATHY WITH NEPHROTIC SYNDROME: ICD-10-CM

## 2024-01-26 PROCEDURE — 83516 IMMUNOASSAY NONANTIBODY: CPT

## 2024-01-26 PROCEDURE — 36415 COLL VENOUS BLD VENIPUNCTURE: CPT

## 2024-01-29 LAB — PLA2R IGG SER IA-ACNC: 23.1 RU/ML (ref 0–19.9)

## 2024-02-07 ENCOUNTER — APPOINTMENT (OUTPATIENT)
Dept: LAB | Facility: CLINIC | Age: 49
End: 2024-02-07
Payer: COMMERCIAL

## 2024-02-07 ENCOUNTER — TRANSCRIBE ORDERS (OUTPATIENT)
Dept: LAB | Facility: CLINIC | Age: 49
End: 2024-02-07

## 2024-02-07 DIAGNOSIS — N04.21 PRIMARY MEMBRANOUS NEPHROPATHY WITH NEPHROTIC SYNDROME: ICD-10-CM

## 2024-02-07 DIAGNOSIS — I16.0 HYPERTENSIVE URGENCY: Primary | ICD-10-CM

## 2024-02-07 DIAGNOSIS — I16.0 HYPERTENSIVE URGENCY: ICD-10-CM

## 2024-02-07 LAB
ANION GAP SERPL CALCULATED.3IONS-SCNC: 4 MMOL/L
BUN SERPL-MCNC: 13 MG/DL (ref 5–25)
CALCIUM SERPL-MCNC: 8.3 MG/DL (ref 8.4–10.2)
CHLORIDE SERPL-SCNC: 110 MMOL/L (ref 96–108)
CO2 SERPL-SCNC: 26 MMOL/L (ref 21–32)
CREAT SERPL-MCNC: 1.12 MG/DL (ref 0.6–1.3)
CREAT UR-MCNC: 111.6 MG/DL
GFR SERPL CREATININE-BSD FRML MDRD: 78 ML/MIN/1.73SQ M
GLUCOSE P FAST SERPL-MCNC: 79 MG/DL (ref 65–99)
POTASSIUM SERPL-SCNC: 4.7 MMOL/L (ref 3.5–5.3)
PROT UR-MCNC: 686 MG/DL
PROT/CREAT UR: 6.15 MG/G{CREAT} (ref 0–0.1)
SODIUM SERPL-SCNC: 140 MMOL/L (ref 135–147)
TACROLIMUS BLD-MCNC: 4.7 NG/ML (ref 3–15)

## 2024-02-07 PROCEDURE — 80197 ASSAY OF TACROLIMUS: CPT

## 2024-02-07 PROCEDURE — 84156 ASSAY OF PROTEIN URINE: CPT

## 2024-02-07 PROCEDURE — 83516 IMMUNOASSAY NONANTIBODY: CPT

## 2024-02-07 PROCEDURE — 80048 BASIC METABOLIC PNL TOTAL CA: CPT

## 2024-02-07 PROCEDURE — 36415 COLL VENOUS BLD VENIPUNCTURE: CPT

## 2024-02-07 PROCEDURE — 82570 ASSAY OF URINE CREATININE: CPT

## 2024-02-09 LAB — PLA2R IGG SER IA-ACNC: 21.7 RU/ML (ref 0–19.9)

## 2024-03-05 ENCOUNTER — TRANSCRIBE ORDERS (OUTPATIENT)
Dept: LAB | Facility: CLINIC | Age: 49
End: 2024-03-05

## 2024-03-05 ENCOUNTER — APPOINTMENT (OUTPATIENT)
Dept: LAB | Facility: CLINIC | Age: 49
End: 2024-03-05
Payer: COMMERCIAL

## 2024-03-05 DIAGNOSIS — N04.21 PRIMARY MEMBRANOUS NEPHROPATHY WITH NEPHROTIC SYNDROME: ICD-10-CM

## 2024-03-05 DIAGNOSIS — N04.21 PRIMARY MEMBRANOUS NEPHROPATHY WITH NEPHROTIC SYNDROME: Primary | ICD-10-CM

## 2024-03-05 LAB — HBV SURFACE AG SER QL: NORMAL

## 2024-03-05 PROCEDURE — 87340 HEPATITIS B SURFACE AG IA: CPT

## 2024-03-29 ENCOUNTER — TELEMEDICINE (OUTPATIENT)
Dept: FAMILY MEDICINE CLINIC | Facility: CLINIC | Age: 49
End: 2024-03-29
Payer: COMMERCIAL

## 2024-03-29 DIAGNOSIS — J45.40 MODERATE PERSISTENT ASTHMA WITHOUT COMPLICATION: ICD-10-CM

## 2024-03-29 DIAGNOSIS — J01.40 ACUTE NON-RECURRENT PANSINUSITIS: Primary | ICD-10-CM

## 2024-03-29 PROCEDURE — 99214 OFFICE O/P EST MOD 30 MIN: CPT | Performed by: NURSE PRACTITIONER

## 2024-03-29 RX ORDER — PREDNISONE 10 MG/1
TABLET ORAL
Qty: 30 TABLET | Refills: 0 | Status: SHIPPED | OUTPATIENT
Start: 2024-03-29

## 2024-03-29 RX ORDER — AMOXICILLIN 400 MG/5ML
400 POWDER, FOR SUSPENSION ORAL 2 TIMES DAILY
Qty: 100 ML | Refills: 0 | Status: SHIPPED | OUTPATIENT
Start: 2024-03-29 | End: 2024-04-08

## 2024-03-29 NOTE — PROGRESS NOTES
Virtual Regular Visit    Verification of patient location:    Patient is located at Home in the following state in which I hold an active license [place]      Assessment/Plan:    Problem List Items Addressed This Visit     Moderate persistent asthma     Patient was advised to continue using inhalers as directed.  Prednisone taper was also ordered to help prevent airway inflammation.         Relevant Medications    predniSONE 10 mg tablet    Acute non-recurrent pansinusitis - Primary     10-day course of amoxicillin and prednisone taper were ordered for treatment of acute sinusitis.         Relevant Medications    amoxicillin (AMOXIL) 400 MG/5ML suspension    predniSONE 10 mg tablet              Reason for visit is   Chief Complaint   Patient presents with   • Virtual Regular Visit          Encounter provider VERITO Laws    Provider located at [facility]  [address] [address] [phone]      Recent Visits  No visits were found meeting these conditions.  Showing recent visits within past 7 days and meeting all other requirements  Today's Visits  Date Type Provider Dept   03/29/24 Telemedicine VERITO Laws [facility]   Showing today's visits and meeting all other requirements  Future Appointments  No visits were found meeting these conditions.  Showing future appointments within next 150 days and meeting all other requirements       The patient was identified by name and date of birth. Reagan Velez was informed that this is a telemedicine visit and that the visit is being conducted through the Epic Embedded platform. He agrees to proceed..  My office door was closed. No one else was in the room.  He acknowledged consent and understanding of privacy and security of the video platform. The patient has agreed to participate and understands they can discontinue the visit at any time.    Patient is aware this is a billable service.      Subjective  Reagan Velez is a 48 y.o. male  .      URI symptoms: Patient reports over the past 2 days he has been experiencing symptoms of rhinorrhea, nasal congestion, sinus pressure and congestion, left ear congestion, sore throat, PND, and productive cough.  Patient denies any fever or chills.  Patient reports that he did complete a home COVID test earlier today which was negative.    Asthma: Patient is currently managed on Dulera twice daily and albuterol inhaler as needed.  The patient reports that he has not been noting any increased shortness of breath since his symptoms began and he has not needed to use his albuterol inhaler recently.         Past Medical History:   Diagnosis Date   • Acute appendicitis    • Asthma    • Chronic kidney disease    • Hypertension    • Membranous nephropathy determined by biopsy 04/24/2019   • Proteinuria        Past Surgical History:   Procedure Laterality Date   • APPENDECTOMY     • IR BIOPSY KIDNEY MASS  2/21/2019   • SHOULDER SURGERY     • TRIGGER FINGER RELEASE         Current Outpatient Medications   Medication Sig Dispense Refill   • amoxicillin (AMOXIL) 400 MG/5ML suspension Take 5 mL (400 mg total) by mouth 2 (two) times a day for 10 days 100 mL 0   • predniSONE 10 mg tablet Use 5 tablets for 2 days. Use 4 tablets for 2 days. Use 3 tablets for 2 days. Use 2 tablets for 2 days. Use 1 tablet for 2 days. 30 tablet 0   • albuterol (2.5 mg/3 mL) 0.083 % nebulizer solution Take 3 mL (2.5 mg total) by nebulization every 4 (four) hours as needed for wheezing or shortness of breath 100 mL 0   • albuterol (PROVENTIL HFA,VENTOLIN HFA) 90 mcg/act inhaler INHALE 2 PUFFS BY MOUTH EVERY 4 HOURS AS NEEDED FOR WHEEZING 6.7 g 1   • amLODIPine (NORVASC) 5 mg tablet Take 1 tablet (5 mg total) by mouth daily 90 tablet 2   • Azelastine-Fluticasone 137-50 MCG/ACT SUSP 1 spray into each nostril 2 (two) times a day 23 g 6   • cyclobenzaprine (FLEXERIL) 10 mg tablet Take 1 tablet (10 mg  total) by mouth 3 (three) times a day as needed for muscle spasms 30 tablet 1   • Dulera 200-5 MCG/ACT inhaler INHALE 2 PUFFS BY MOUTH 2 TIMES A DAY RINSE MOUTH AFTER USE. 13 g 5   • EPINEPHrine (EPIPEN) 0.3 mg/0.3 mL SOAJ USE IN OUTER THIGH IN CASE OF AN EMERGENCY. DISP WHATEVER GENERIC EPINEPHRINE IS COVERED     • loratadine (CLARITIN) 10 mg tablet TAKE 1 TABLET BY MOUTH EVERY DAY 90 tablet 1   • Multiple Vitamin (multivitamin) capsule Take 1 capsule by mouth daily     • mupirocin (BACTROBAN) 2 % ointment Apply topically 3 (three) times a day To b/l nasal cavity for one week (Patient not taking: Reported on 11/9/2023) 22 g 3   • tacrolimus (PROGRAF) 1 mg capsule TAKE 1 CAPSULE (1 MG TOTAL) BY MOUTH EVERY 12 (TWELVE) HOURS 180 capsule 2   • torsemide (DEMADEX) 10 mg tablet TAKE 1 TABLET BY MOUTH EVERY DAY 90 tablet 2     No current facility-administered medications for this visit.        Allergies   Allergen Reactions   • Molds & Smuts Shortness Of Breath and Rash   • Other Shortness Of Breath   • Shellfish Allergy - Food Allergy Hives   • Shellfish-Derived Products - Food Allergy Hives       Review of Systems   Constitutional:  Negative for chills and fever.   HENT:  Positive for congestion, postnasal drip, rhinorrhea, sinus pressure, sinus pain and sore throat. Negative for ear pain.         Left ear congestion   Eyes:  Negative for pain and visual disturbance.   Respiratory:  Positive for cough (productive). Negative for chest tightness, shortness of breath and wheezing.    Cardiovascular:  Negative for chest pain, palpitations and leg swelling.   Gastrointestinal:  Negative for abdominal pain, constipation, diarrhea, nausea and vomiting.   Endocrine: Negative for cold intolerance and heat intolerance.   Genitourinary:  Negative for decreased urine volume, dysuria and hematuria.   Musculoskeletal:  Negative for arthralgias, back pain and myalgias.   Skin:  Negative for color change and rash.    Allergic/Immunologic: Negative for environmental allergies.   Neurological:  Negative for dizziness, seizures, syncope, weakness, light-headedness, numbness and headaches.   Hematological:  Negative for adenopathy.   Psychiatric/Behavioral:  Negative for confusion. The patient is not nervous/anxious.    All other systems reviewed and are negative.      Video Exam    There were no vitals filed for this visit.    Physical Exam  Vitals reviewed: limited due to video visit.   Constitutional:       General: He is not in acute distress.     Appearance: Normal appearance. He is not ill-appearing.   Neurological:      Mental Status: He is alert.          Visit Time  Total Visit Duration: 15 minutes

## 2024-03-29 NOTE — ASSESSMENT & PLAN NOTE
Patient was advised to continue using inhalers as directed.  Prednisone taper was also ordered to help prevent airway inflammation.

## 2024-04-15 ENCOUNTER — OFFICE VISIT (OUTPATIENT)
Dept: FAMILY MEDICINE CLINIC | Facility: CLINIC | Age: 49
End: 2024-04-15
Payer: COMMERCIAL

## 2024-04-15 VITALS
SYSTOLIC BLOOD PRESSURE: 140 MMHG | BODY MASS INDEX: 27.05 KG/M2 | HEART RATE: 86 BPM | OXYGEN SATURATION: 98 % | WEIGHT: 147 LBS | DIASTOLIC BLOOD PRESSURE: 90 MMHG | HEIGHT: 62 IN

## 2024-04-15 DIAGNOSIS — I10 ESSENTIAL HYPERTENSION: Primary | ICD-10-CM

## 2024-04-15 DIAGNOSIS — Z12.5 SCREENING FOR PROSTATE CANCER: ICD-10-CM

## 2024-04-15 DIAGNOSIS — F41.9 ANXIETY: ICD-10-CM

## 2024-04-15 DIAGNOSIS — J45.40 MODERATE PERSISTENT ASTHMA, UNSPECIFIED WHETHER COMPLICATED: ICD-10-CM

## 2024-04-15 DIAGNOSIS — N02.2 MEMBRANOUS NEPHROPATHY DETERMINED BY BIOPSY: ICD-10-CM

## 2024-04-15 DIAGNOSIS — M54.50 CHRONIC BILATERAL LOW BACK PAIN WITHOUT SCIATICA: ICD-10-CM

## 2024-04-15 DIAGNOSIS — E77.8 HYPOPROTEINEMIA (HCC): ICD-10-CM

## 2024-04-15 DIAGNOSIS — G89.29 CHRONIC BILATERAL LOW BACK PAIN WITHOUT SCIATICA: ICD-10-CM

## 2024-04-15 PROBLEM — R10.2 PELVIC PAIN: Status: RESOLVED | Noted: 2023-07-31 | Resolved: 2024-04-15

## 2024-04-15 PROBLEM — N04.21: Status: ACTIVE | Noted: 2023-11-28

## 2024-04-15 PROBLEM — K90.41 GLUTEN-SENSITIVE ENTEROPATHY: Status: ACTIVE | Noted: 2024-01-30

## 2024-04-15 PROBLEM — J01.40 ACUTE NON-RECURRENT PANSINUSITIS: Status: RESOLVED | Noted: 2023-06-09 | Resolved: 2024-04-15

## 2024-04-15 PROCEDURE — 99214 OFFICE O/P EST MOD 30 MIN: CPT | Performed by: FAMILY MEDICINE

## 2024-04-15 RX ORDER — LORAZEPAM 0.5 MG/1
0.5 TABLET ORAL 2 TIMES DAILY PRN
Qty: 30 TABLET | Refills: 1 | Status: SHIPPED | OUTPATIENT
Start: 2024-04-15

## 2024-04-15 RX ORDER — AMLODIPINE AND VALSARTAN 5; 320 MG/1; MG/1
1 TABLET ORAL DAILY
Qty: 30 TABLET | Refills: 5 | Status: SHIPPED | OUTPATIENT
Start: 2024-04-15

## 2024-04-15 RX ORDER — MOMETASONE FUROATE AND FORMOTEROL FUMARATE DIHYDRATE 200; 5 UG/1; UG/1
2 AEROSOL RESPIRATORY (INHALATION) 2 TIMES DAILY
Qty: 13 G | Refills: 5 | Status: SHIPPED | OUTPATIENT
Start: 2024-04-15

## 2024-04-15 RX ORDER — ALBUTEROL SULFATE 2.5 MG/3ML
2.5 SOLUTION RESPIRATORY (INHALATION) EVERY 4 HOURS PRN
Qty: 100 ML | Refills: 0 | Status: SHIPPED | OUTPATIENT
Start: 2024-04-15

## 2024-04-15 RX ORDER — CYCLOBENZAPRINE HCL 10 MG
10 TABLET ORAL 3 TIMES DAILY PRN
Qty: 30 TABLET | Refills: 1 | Status: SHIPPED | OUTPATIENT
Start: 2024-04-15

## 2024-04-15 RX ORDER — VALSARTAN 80 MG/1
TABLET ORAL
COMMUNITY
Start: 2024-03-09 | End: 2024-04-15

## 2024-04-15 NOTE — PATIENT INSTRUCTIONS
Change  to Exforge 5/320, await  lab. See if  he needs  Hep B  vaccine-will contact  kidney doc, paperwork filled  out  for fmla

## 2024-04-15 NOTE — ASSESSMENT & PLAN NOTE
BP went  up when stopped  Amlodipine and  changed  to Valsartan, BP went  up to over  200, went  to ER  and had  stress test

## 2024-04-15 NOTE — PROGRESS NOTES
Name: Reagan Velez      : 1975      MRN: [id number]  Encounter Provider: Nenita Granados MD  Encounter Date: 4/15/2024   Encounter department: [facility] PRIMARY CARE    Assessment & Plan     1. Essential hypertension  Assessment & Plan:  BP went  up when stopped  Amlodipine and  changed  to Valsartan, BP went  up to over  200, went  to ER  and had  stress test    Orders:  -     amLODIPine-valsartan (EXFORGE) 5-320 MG per tablet; Take 1 tablet by mouth daily    2. Moderate persistent asthma, unspecified whether complicated  -     mometasone-formoterol (Dulera) 200-5 MCG/ACT inhaler; Inhale 2 puffs 2 (two) times a day Rinse mouth after use.  -     albuterol (2.5 mg/3 mL) 0.083 % nebulizer solution; Take 3 mL (2.5 mg total) by nebulization every 4 (four) hours as needed for wheezing or shortness of breath    3. Chronic bilateral low back pain without sciatica  -     cyclobenzaprine (FLEXERIL) 10 mg tablet; Take 1 tablet (10 mg total) by mouth 3 (three) times a day as needed for muscle spasms    4. Hypoproteinemia (HCC)    5. Screening for prostate cancer  -     PSA, Total Screen; Future    6. Membranous nephropathy determined by biopsy  -     Lipid panel    7. Anxiety  -     LORazepam (Ativan) 0.5 mg tablet; Take 1 tablet (0.5 mg total) by mouth 2 (two) times a day as needed for anxiety           Subjective      Patient presents with:  Follow-up: Patient present today to fill out [facility] paperwork.  He needs some intermittently due to the anxiety of the blood pressure being elevated he also would like a refill on his lorazepam from years ago-last script  2  years  ago  Patient was switched by the kidney doctor at [facility] from 80 mg of valsartan with 5 mg of amlodipine to just 320 mg of valsartan his blood pressure actually went as high as 205 102 and he went up in the emergency room because he was having some nausea and dizziness related to his blood pressure being elevated they did an EKG that was  normal but they recommended he see cardiology he did do a stress test that was normal and he still not quite sure what the need for the cardiology visit was.  His kidney doctor started rituxan  for his membranous nephropathy.  Of note his hepatitis B surface antigen is negative and he has been immunized I did tell him that he should talk to the kidney doctor about whether he he needs a booster vaccine or not      Review of Systems   Constitutional:  Negative for activity change, appetite change and fatigue.   Respiratory:  Positive for chest tightness. Negative for shortness of breath.    Cardiovascular:  Negative for chest pain.   Genitourinary:  Negative for difficulty urinating.   Musculoskeletal:  Negative for arthralgias.   Neurological:  Positive for numbness and headaches.   Psychiatric/Behavioral:  The patient is nervous/anxious.        Current Outpatient Medications on File Prior to Visit   Medication Sig    albuterol (PROVENTIL HFA,VENTOLIN HFA) 90 mcg/act inhaler INHALE 2 PUFFS BY MOUTH EVERY 4 HOURS AS NEEDED FOR WHEEZING    Azelastine-Fluticasone 137-50 MCG/ACT SUSP 1 spray into each nostril 2 (two) times a day    EPINEPHrine (EPIPEN) 0.3 mg/0.3 mL SOAJ USE IN OUTER THIGH IN CASE OF AN EMERGENCY. DISP WHATEVER GENERIC EPINEPHRINE IS COVERED    loratadine (CLARITIN) 10 mg tablet TAKE 1 TABLET BY MOUTH EVERY DAY    Multiple Vitamin (multivitamin) capsule Take 1 capsule by mouth daily    [DISCONTINUED] albuterol (2.5 mg/3 mL) 0.083 % nebulizer solution Take 3 mL (2.5 mg total) by nebulization every 4 (four) hours as needed for wheezing or shortness of breath    [DISCONTINUED] cyclobenzaprine (FLEXERIL) 10 mg tablet Take 1 tablet (10 mg total) by mouth 3 (three) times a day as needed for muscle spasms    [DISCONTINUED] Dulera 200-5 MCG/ACT inhaler INHALE 2 PUFFS BY MOUTH 2 TIMES A DAY RINSE MOUTH AFTER USE.    [DISCONTINUED] valsartan (DIOVAN) 80 mg tablet     [DISCONTINUED] amLODIPine (NORVASC) 5 mg tablet  "Take 1 tablet (5 mg total) by mouth daily    [DISCONTINUED] mupirocin (BACTROBAN) 2 % ointment Apply topically 3 (three) times a day To b/l nasal cavity for one week (Patient not taking: Reported on 11/9/2023)    [DISCONTINUED] predniSONE 10 mg tablet Use 5 tablets for 2 days. Use 4 tablets for 2 days. Use 3 tablets for 2 days. Use 2 tablets for 2 days. Use 1 tablet for 2 days.    [DISCONTINUED] tacrolimus (PROGRAF) 1 mg capsule TAKE 1 CAPSULE (1 MG TOTAL) BY MOUTH EVERY 12 (TWELVE) HOURS    [DISCONTINUED] torsemide (DEMADEX) 10 mg tablet TAKE 1 TABLET BY MOUTH EVERY DAY       Objective     /90 (BP Location: Left arm, Patient Position: Sitting, Cuff Size: Standard)   Pulse 86   Ht 5' 2\" (1.575 m)   Wt 66.7 kg (147 lb)   SpO2 98%   BMI 26.89 kg/m²     Physical Exam  Vitals reviewed.   Constitutional:       Appearance: Normal appearance.   Cardiovascular:      Rate and Rhythm: Normal rate and regular rhythm.      Pulses: Normal pulses.      Heart sounds: Normal heart sounds.   Pulmonary:      Effort: Pulmonary effort is normal.      Breath sounds: Normal breath sounds.   Musculoskeletal:      Right lower leg: No edema.      Left lower leg: No edema.   Lymphadenopathy:      Cervical: No cervical adenopathy.   Neurological:      Mental Status: He is alert.   Psychiatric:         Mood and Affect: Mood normal.       Nenita Granados MD    "

## 2024-04-24 ENCOUNTER — TELEPHONE (OUTPATIENT)
Dept: FAMILY MEDICINE CLINIC | Facility: CLINIC | Age: 49
End: 2024-04-24

## 2024-04-24 ENCOUNTER — TRANSCRIBE ORDERS (OUTPATIENT)
Dept: LAB | Facility: CLINIC | Age: 49
End: 2024-04-24

## 2024-04-24 ENCOUNTER — APPOINTMENT (OUTPATIENT)
Dept: LAB | Facility: CLINIC | Age: 49
End: 2024-04-24
Payer: COMMERCIAL

## 2024-04-24 DIAGNOSIS — N04.21 PRIMARY MEMBRANOUS NEPHROPATHY WITH NEPHROTIC SYNDROME: Primary | ICD-10-CM

## 2024-04-24 DIAGNOSIS — Z12.5 SCREENING FOR PROSTATE CANCER: ICD-10-CM

## 2024-04-24 DIAGNOSIS — R80.9 PROTEINURIA, UNSPECIFIED TYPE: ICD-10-CM

## 2024-04-24 DIAGNOSIS — N04.21 PRIMARY MEMBRANOUS NEPHROPATHY WITH NEPHROTIC SYNDROME: ICD-10-CM

## 2024-04-24 LAB
ALBUMIN SERPL BCP-MCNC: 2.4 G/DL (ref 3.5–5)
ANION GAP SERPL CALCULATED.3IONS-SCNC: 6 MMOL/L (ref 4–13)
BUN SERPL-MCNC: 16 MG/DL (ref 5–25)
CALCIUM ALBUM COR SERPL-MCNC: 9 MG/DL (ref 8.3–10.1)
CALCIUM SERPL-MCNC: 7.7 MG/DL (ref 8.4–10.2)
CHLORIDE SERPL-SCNC: 112 MMOL/L (ref 96–108)
CHOLEST SERPL-MCNC: 239 MG/DL
CO2 SERPL-SCNC: 23 MMOL/L (ref 21–32)
CREAT SERPL-MCNC: 1.27 MG/DL (ref 0.6–1.3)
CREAT UR-MCNC: 158.8 MG/DL
ERYTHROCYTE [DISTWIDTH] IN BLOOD BY AUTOMATED COUNT: 12.6 % (ref 11.6–15.1)
GFR SERPL CREATININE-BSD FRML MDRD: 67 ML/MIN/1.73SQ M
GLUCOSE P FAST SERPL-MCNC: 84 MG/DL (ref 65–99)
HCT VFR BLD AUTO: 39 % (ref 36.5–49.3)
HDLC SERPL-MCNC: 54 MG/DL
HGB BLD-MCNC: 12.4 G/DL (ref 12–17)
LDLC SERPL CALC-MCNC: 168 MG/DL (ref 0–100)
MCH RBC QN AUTO: 28.6 PG (ref 26.8–34.3)
MCHC RBC AUTO-ENTMCNC: 31.8 G/DL (ref 31.4–37.4)
MCV RBC AUTO: 90 FL (ref 82–98)
NONHDLC SERPL-MCNC: 185 MG/DL
PHOSPHATE SERPL-MCNC: 2.5 MG/DL (ref 2.7–4.5)
PLATELET # BLD AUTO: 243 THOUSANDS/UL (ref 149–390)
PMV BLD AUTO: 11.5 FL (ref 8.9–12.7)
POTASSIUM SERPL-SCNC: 4 MMOL/L (ref 3.5–5.3)
PROT UR-MCNC: 984 MG/DL
PROT/CREAT UR: 6.2 MG/G{CREAT} (ref 0–0.1)
PSA SERPL-MCNC: 2.75 NG/ML (ref 0–4)
RBC # BLD AUTO: 4.34 MILLION/UL (ref 3.88–5.62)
SODIUM SERPL-SCNC: 141 MMOL/L (ref 135–147)
TRIGL SERPL-MCNC: 85 MG/DL
WBC # BLD AUTO: 4.36 THOUSAND/UL (ref 4.31–10.16)

## 2024-04-24 PROCEDURE — 84156 ASSAY OF PROTEIN URINE: CPT

## 2024-04-24 PROCEDURE — 83516 IMMUNOASSAY NONANTIBODY: CPT

## 2024-04-24 PROCEDURE — 80069 RENAL FUNCTION PANEL: CPT

## 2024-04-24 PROCEDURE — 85027 COMPLETE CBC AUTOMATED: CPT

## 2024-04-24 PROCEDURE — 82570 ASSAY OF URINE CREATININE: CPT

## 2024-04-24 PROCEDURE — 36415 COLL VENOUS BLD VENIPUNCTURE: CPT | Performed by: FAMILY MEDICINE

## 2024-04-24 PROCEDURE — G0103 PSA SCREENING: HCPCS

## 2024-04-24 PROCEDURE — 80061 LIPID PANEL: CPT | Performed by: FAMILY MEDICINE

## 2024-04-24 NOTE — TELEPHONE ENCOUNTER
Patient came into our office today, dropping off FMLA forms for provider to complete, forms have been dropped off on provider desk as needed.    Once forms are completed please call back pt as needed, thank you.

## 2024-04-26 DIAGNOSIS — E78.5 HYPERLIPIDEMIA, UNSPECIFIED HYPERLIPIDEMIA TYPE: Primary | ICD-10-CM

## 2024-04-26 LAB — PLA2R IGG SER IA-ACNC: 10.4 RU/ML (ref 0–19.9)

## 2024-04-26 RX ORDER — EZETIMIBE 10 MG/1
10 TABLET ORAL DAILY
Qty: 30 TABLET | Refills: 3 | Status: SHIPPED | OUTPATIENT
Start: 2024-04-26 | End: 2024-07-25

## 2024-05-18 DIAGNOSIS — I10 ESSENTIAL HYPERTENSION: ICD-10-CM

## 2024-05-18 RX ORDER — AMLODIPINE AND VALSARTAN 5; 320 MG/1; MG/1
1 TABLET ORAL DAILY
Qty: 90 TABLET | Refills: 1 | Status: SHIPPED | OUTPATIENT
Start: 2024-05-18

## 2024-05-21 DIAGNOSIS — E78.5 HYPERLIPIDEMIA, UNSPECIFIED HYPERLIPIDEMIA TYPE: ICD-10-CM

## 2024-05-22 RX ORDER — EZETIMIBE 10 MG/1
10 TABLET ORAL DAILY
Qty: 90 TABLET | Refills: 1 | Status: SHIPPED | OUTPATIENT
Start: 2024-05-22

## 2024-05-28 ENCOUNTER — TRANSCRIBE ORDERS (OUTPATIENT)
Dept: LAB | Facility: CLINIC | Age: 49
End: 2024-05-28

## 2024-05-28 ENCOUNTER — APPOINTMENT (OUTPATIENT)
Dept: LAB | Facility: CLINIC | Age: 49
End: 2024-05-28
Payer: COMMERCIAL

## 2024-05-28 DIAGNOSIS — N04.21 PRIMARY MEMBRANOUS NEPHROPATHY WITH NEPHROTIC SYNDROME: Primary | ICD-10-CM

## 2024-05-28 DIAGNOSIS — R80.9 PROTEINURIA, UNSPECIFIED TYPE: ICD-10-CM

## 2024-05-28 DIAGNOSIS — N04.21 PRIMARY MEMBRANOUS NEPHROPATHY WITH NEPHROTIC SYNDROME: ICD-10-CM

## 2024-05-28 LAB
ALBUMIN SERPL BCP-MCNC: 2.8 G/DL (ref 3.5–5)
ANION GAP SERPL CALCULATED.3IONS-SCNC: 9 MMOL/L (ref 4–13)
BASOPHILS # BLD AUTO: 0.02 THOUSANDS/ÂΜL (ref 0–0.1)
BASOPHILS NFR BLD AUTO: 1 % (ref 0–1)
BUN SERPL-MCNC: 15 MG/DL (ref 5–25)
CALCIUM ALBUM COR SERPL-MCNC: 9.1 MG/DL (ref 8.3–10.1)
CALCIUM SERPL-MCNC: 8.1 MG/DL (ref 8.4–10.2)
CHLORIDE SERPL-SCNC: 106 MMOL/L (ref 96–108)
CO2 SERPL-SCNC: 26 MMOL/L (ref 21–32)
CREAT SERPL-MCNC: 1.33 MG/DL (ref 0.6–1.3)
CREAT UR-MCNC: 118.1 MG/DL
EOSINOPHIL # BLD AUTO: 0.18 THOUSAND/ÂΜL (ref 0–0.61)
EOSINOPHIL NFR BLD AUTO: 4 % (ref 0–6)
ERYTHROCYTE [DISTWIDTH] IN BLOOD BY AUTOMATED COUNT: 11.9 % (ref 11.6–15.1)
GFR SERPL CREATININE-BSD FRML MDRD: 63 ML/MIN/1.73SQ M
GLUCOSE P FAST SERPL-MCNC: 85 MG/DL (ref 65–99)
HCT VFR BLD AUTO: 38.9 % (ref 36.5–49.3)
HGB BLD-MCNC: 12.7 G/DL (ref 12–17)
IMM GRANULOCYTES # BLD AUTO: 0.01 THOUSAND/UL (ref 0–0.2)
IMM GRANULOCYTES NFR BLD AUTO: 0 % (ref 0–2)
LYMPHOCYTES # BLD AUTO: 1.54 THOUSANDS/ÂΜL (ref 0.6–4.47)
LYMPHOCYTES NFR BLD AUTO: 36 % (ref 14–44)
MCH RBC QN AUTO: 28.3 PG (ref 26.8–34.3)
MCHC RBC AUTO-ENTMCNC: 32.6 G/DL (ref 31.4–37.4)
MCV RBC AUTO: 87 FL (ref 82–98)
MONOCYTES # BLD AUTO: 0.53 THOUSAND/ÂΜL (ref 0.17–1.22)
MONOCYTES NFR BLD AUTO: 12 % (ref 4–12)
NEUTROPHILS # BLD AUTO: 2.01 THOUSANDS/ÂΜL (ref 1.85–7.62)
NEUTS SEG NFR BLD AUTO: 47 % (ref 43–75)
NRBC BLD AUTO-RTO: 0 /100 WBCS
PHOSPHATE SERPL-MCNC: 3.8 MG/DL (ref 2.7–4.5)
PLATELET # BLD AUTO: 233 THOUSANDS/UL (ref 149–390)
PMV BLD AUTO: 12 FL (ref 8.9–12.7)
POTASSIUM SERPL-SCNC: 4.1 MMOL/L (ref 3.5–5.3)
PROT UR-MCNC: 491 MG/DL
PROT/CREAT UR: 4.16 MG/G{CREAT} (ref 0–0.1)
RBC # BLD AUTO: 4.48 MILLION/UL (ref 3.88–5.62)
SODIUM SERPL-SCNC: 141 MMOL/L (ref 135–147)
WBC # BLD AUTO: 4.29 THOUSAND/UL (ref 4.31–10.16)

## 2024-05-28 PROCEDURE — 80069 RENAL FUNCTION PANEL: CPT

## 2024-05-28 PROCEDURE — 84156 ASSAY OF PROTEIN URINE: CPT

## 2024-05-28 PROCEDURE — 36415 COLL VENOUS BLD VENIPUNCTURE: CPT

## 2024-05-28 PROCEDURE — 85025 COMPLETE CBC W/AUTO DIFF WBC: CPT

## 2024-05-28 PROCEDURE — 82570 ASSAY OF URINE CREATININE: CPT

## 2024-05-28 PROCEDURE — 83516 IMMUNOASSAY NONANTIBODY: CPT

## 2024-05-30 LAB — PLA2R IGG SER IA-ACNC: 5.5 RU/ML (ref 0–19.9)

## 2024-07-09 ENCOUNTER — APPOINTMENT (OUTPATIENT)
Dept: LAB | Facility: CLINIC | Age: 49
End: 2024-07-09
Payer: COMMERCIAL

## 2024-07-09 ENCOUNTER — TRANSCRIBE ORDERS (OUTPATIENT)
Dept: LAB | Facility: CLINIC | Age: 49
End: 2024-07-09

## 2024-07-09 DIAGNOSIS — N04.21 PRIMARY MEMBRANOUS NEPHROPATHY WITH NEPHROTIC SYNDROME: Primary | ICD-10-CM

## 2024-07-09 DIAGNOSIS — N04.21 PRIMARY MEMBRANOUS NEPHROPATHY WITH NEPHROTIC SYNDROME: ICD-10-CM

## 2024-07-09 LAB
ALBUMIN SERPL BCG-MCNC: 2.7 G/DL (ref 3.5–5)
ANION GAP SERPL CALCULATED.3IONS-SCNC: 8 MMOL/L (ref 4–13)
BUN SERPL-MCNC: 35 MG/DL (ref 5–25)
CALCIUM ALBUM COR SERPL-MCNC: 9.6 MG/DL (ref 8.3–10.1)
CALCIUM SERPL-MCNC: 8.6 MG/DL (ref 8.4–10.2)
CHLORIDE SERPL-SCNC: 105 MMOL/L (ref 96–108)
CO2 SERPL-SCNC: 26 MMOL/L (ref 21–32)
CREAT SERPL-MCNC: 1.37 MG/DL (ref 0.6–1.3)
CREAT UR-MCNC: 136.2 MG/DL
GFR SERPL CREATININE-BSD FRML MDRD: 61 ML/MIN/1.73SQ M
GLUCOSE P FAST SERPL-MCNC: 80 MG/DL (ref 65–99)
PHOSPHATE SERPL-MCNC: 4 MG/DL (ref 2.7–4.5)
POTASSIUM SERPL-SCNC: 4 MMOL/L (ref 3.5–5.3)
PROT UR-MCNC: 914 MG/DL
PROT/CREAT UR: 6.71 MG/G{CREAT} (ref 0–0.1)
SODIUM SERPL-SCNC: 139 MMOL/L (ref 135–147)

## 2024-07-09 PROCEDURE — 83516 IMMUNOASSAY NONANTIBODY: CPT

## 2024-07-09 PROCEDURE — 36415 COLL VENOUS BLD VENIPUNCTURE: CPT

## 2024-07-09 PROCEDURE — 80069 RENAL FUNCTION PANEL: CPT

## 2024-07-09 PROCEDURE — 84156 ASSAY OF PROTEIN URINE: CPT

## 2024-07-09 PROCEDURE — 82570 ASSAY OF URINE CREATININE: CPT

## 2024-07-11 LAB — PLA2R IGG SER IA-ACNC: 7.8 RU/ML (ref 0–19.9)

## 2024-07-30 ENCOUNTER — TELEMEDICINE (OUTPATIENT)
Dept: FAMILY MEDICINE CLINIC | Facility: CLINIC | Age: 49
End: 2024-07-30
Payer: COMMERCIAL

## 2024-07-30 DIAGNOSIS — J01.00 ACUTE MAXILLARY SINUSITIS, RECURRENCE NOT SPECIFIED: Primary | ICD-10-CM

## 2024-07-30 PROBLEM — G44.029 CHRONIC CLUSTER HEADACHE, NOT INTRACTABLE: Status: RESOLVED | Noted: 2022-06-03 | Resolved: 2024-07-30

## 2024-07-30 PROCEDURE — 99213 OFFICE O/P EST LOW 20 MIN: CPT | Performed by: FAMILY MEDICINE

## 2024-07-30 RX ORDER — CHLORTHALIDONE 25 MG/1
25 TABLET ORAL
COMMUNITY
Start: 2024-07-26 | End: 2025-07-26

## 2024-07-30 RX ORDER — VALSARTAN 80 MG/1
80 TABLET ORAL DAILY
COMMUNITY
Start: 2024-06-18

## 2024-07-30 RX ORDER — CEFIXIME 200 MG/5ML
5 POWDER, FOR SUSPENSION ORAL 2 TIMES DAILY
Qty: 100 ML | Refills: 0 | Status: SHIPPED | OUTPATIENT
Start: 2024-07-30 | End: 2024-08-09

## 2024-07-30 NOTE — PROGRESS NOTES
Virtual Regular Visit  Name: Reagan Velez      : 1975      MRN: [id number]  Encounter Provider: Nenita Granados MD  Encounter Date: 2024   Encounter department: [facility] PRIMARY CARE    Verification of patient location:    Patient is located at Home in the following state in which I hold an active license [place]    Assessment & Plan   1. Acute maxillary sinusitis, recurrence not specified  -     cefixime (SUPRAX) 200 MG/5ML suspension; Take 5 mL (200 mg total) by mouth 2 (two) times a day for 10 days         Encounter provider Nenita Granados MD    The patient was identified by name and date of birth. Reagan Velez was informed that this is a telemedicine visit and that the visit is being conducted through the Epic Embedded platform. He agrees to proceed..  My office door was closed. No one else was in the room.  He acknowledged consent and understanding of privacy and security of the video platform. The patient has agreed to participate and understands they can discontinue the visit at any time.    Patient is aware this is a billable service.     History of Present Illness     Uri Symptoms for couple weeks since he was came back from a trip from [place] he did do a COVID test that was negative patient is prone to sinus problems and has been blowing out and coughing up some brownish to yellow mucus with some facial pressure no fever he is limited in the OTC meds he can take because of his blood pressure been trying to just take plain Robitussin but that has not helped with the cough or the congestion he has little bit of a scratchy throat because of the postnasal drip, used  nebulizer  a  couple times, usually  gets  liquid  antibiotic  because  has  trouble  swallowing  pills        Review of Systems   Constitutional:  Negative for chills and fever.   HENT:  Positive for congestion, postnasal drip, rhinorrhea, sinus pressure, sinus pain and sore throat.    Respiratory:  Positive for cough.  Negative for shortness of breath and wheezing.    Cardiovascular:  Negative for chest pain.   Neurological:  Positive for headaches. Negative for dizziness and light-headedness.       Objective     There were no vitals taken for this visit.  Physical Exam  Constitutional:       Appearance: Normal appearance.   HENT:      Nose: Congestion present. No rhinorrhea.      Right Sinus: No maxillary sinus tenderness or frontal sinus tenderness.      Left Sinus: Maxillary sinus tenderness and frontal sinus tenderness present.   Eyes:      General:         Right eye: No discharge.         Left eye: No discharge.   Pulmonary:      Effort: Pulmonary effort is normal. No respiratory distress.   Neurological:      Mental Status: He is alert.   Psychiatric:         Mood and Affect: Mood normal.         Visit Time  Total Visit Duration: 8

## 2024-08-16 ENCOUNTER — RA CDI HCC (OUTPATIENT)
Dept: OTHER | Facility: HOSPITAL | Age: 49
End: 2024-08-16

## 2024-08-20 ENCOUNTER — TRANSCRIBE ORDERS (OUTPATIENT)
Dept: LAB | Facility: CLINIC | Age: 49
End: 2024-08-20

## 2024-08-20 ENCOUNTER — APPOINTMENT (OUTPATIENT)
Dept: LAB | Facility: CLINIC | Age: 49
End: 2024-08-20
Payer: COMMERCIAL

## 2024-08-20 DIAGNOSIS — N04.21 PRIMARY MEMBRANOUS NEPHROPATHY WITH NEPHROTIC SYNDROME: Primary | ICD-10-CM

## 2024-08-20 DIAGNOSIS — R80.9 PROTEINURIA, UNSPECIFIED TYPE: ICD-10-CM

## 2024-08-20 DIAGNOSIS — N04.21 PRIMARY MEMBRANOUS NEPHROPATHY WITH NEPHROTIC SYNDROME: ICD-10-CM

## 2024-08-20 LAB
ALBUMIN SERPL BCG-MCNC: 2.7 G/DL (ref 3.5–5)
ANION GAP SERPL CALCULATED.3IONS-SCNC: 6 MMOL/L (ref 4–13)
BUN SERPL-MCNC: 24 MG/DL (ref 5–25)
CALCIUM ALBUM COR SERPL-MCNC: 8.6 MG/DL (ref 8.3–10.1)
CALCIUM SERPL-MCNC: 7.6 MG/DL (ref 8.4–10.2)
CHLORIDE SERPL-SCNC: 110 MMOL/L (ref 96–108)
CO2 SERPL-SCNC: 24 MMOL/L (ref 21–32)
CREAT SERPL-MCNC: 1.5 MG/DL (ref 0.6–1.3)
CREAT UR-MCNC: 154.4 MG/DL
GFR SERPL CREATININE-BSD FRML MDRD: 55 ML/MIN/1.73SQ M
GLUCOSE P FAST SERPL-MCNC: 85 MG/DL (ref 65–99)
PHOSPHATE SERPL-MCNC: 3.1 MG/DL (ref 2.7–4.5)
POTASSIUM SERPL-SCNC: 4.2 MMOL/L (ref 3.5–5.3)
PROT UR-MCNC: 861.1 MG/DL
PROT/CREAT UR: 5.6 MG/G{CREAT} (ref 0–0.1)
SODIUM SERPL-SCNC: 140 MMOL/L (ref 135–147)

## 2024-08-20 PROCEDURE — 36415 COLL VENOUS BLD VENIPUNCTURE: CPT

## 2024-08-20 PROCEDURE — 82570 ASSAY OF URINE CREATININE: CPT

## 2024-08-20 PROCEDURE — 83516 IMMUNOASSAY NONANTIBODY: CPT

## 2024-08-20 PROCEDURE — 80069 RENAL FUNCTION PANEL: CPT

## 2024-08-20 PROCEDURE — 84156 ASSAY OF PROTEIN URINE: CPT

## 2024-08-20 PROCEDURE — 85027 COMPLETE CBC AUTOMATED: CPT

## 2024-08-20 NOTE — PROGRESS NOTES
Moderate persistent asthmaNoted 9/17/2018  [J45 40]    NOT on the BPA- please assess using MEAT for 2023 billing    [facility] [address]  coding opportunities          Chart Reviewed number of suggestions sent to Provider: 1     Patients Insurance        Commercial Insurance: [address] [place] [address]
[FreeTextEntry1] : viral trigger Use humidifier, saline nasal drops, encourage fluids and fever control as needed. Elevate head of bed. Return for spiking fever, worsening symptoms, respiratory distress or concerns. Recommend using mist from a humidifier. Allow the child to breathe cool air during the night by opening a window or door. Fever can be treated with an over-the-counter medication such as acetaminophen or ibuprofen. Coughing can be treated with warm, clear fluids to loosen mucus on the vocal cords. Warm water, apple juice, or lemonade is safe for children older than four months. Frozen juice popsicles also can be given. Keep the child's head elevated. If the child's stridor does not improve contact health care provider immediately.

## 2024-08-21 LAB
ERYTHROCYTE [DISTWIDTH] IN BLOOD BY AUTOMATED COUNT: 12.7 % (ref 11.6–15.1)
HCT VFR BLD AUTO: 38.3 % (ref 36.5–49.3)
HGB BLD-MCNC: 11.8 G/DL (ref 12–17)
MCH RBC QN AUTO: 28.4 PG (ref 26.8–34.3)
MCHC RBC AUTO-ENTMCNC: 30.8 G/DL (ref 31.4–37.4)
MCV RBC AUTO: 92 FL (ref 82–98)
PLATELET # BLD AUTO: 184 THOUSANDS/UL (ref 149–390)
PMV BLD AUTO: 11.3 FL (ref 8.9–12.7)
RBC # BLD AUTO: 4.16 MILLION/UL (ref 3.88–5.62)
WBC # BLD AUTO: 5.28 THOUSAND/UL (ref 4.31–10.16)

## 2024-08-23 LAB — PLA2R IGG SER IA-ACNC: 7.3 RU/ML (ref 0–19.9)

## 2024-08-29 ENCOUNTER — OFFICE VISIT (OUTPATIENT)
Dept: FAMILY MEDICINE CLINIC | Facility: CLINIC | Age: 49
End: 2024-08-29
Payer: COMMERCIAL

## 2024-08-29 VITALS
DIASTOLIC BLOOD PRESSURE: 82 MMHG | HEIGHT: 62 IN | TEMPERATURE: 98.2 F | SYSTOLIC BLOOD PRESSURE: 130 MMHG | WEIGHT: 149 LBS | HEART RATE: 86 BPM | BODY MASS INDEX: 27.42 KG/M2 | OXYGEN SATURATION: 98 %

## 2024-08-29 DIAGNOSIS — I10 ESSENTIAL HYPERTENSION: Primary | ICD-10-CM

## 2024-08-29 DIAGNOSIS — N04.21 PRIMARY MEMBRANOUS NEPHROPATHY WITH NEPHROTIC SYNDROME: ICD-10-CM

## 2024-08-29 DIAGNOSIS — E78.5 HYPERLIPIDEMIA, UNSPECIFIED HYPERLIPIDEMIA TYPE: ICD-10-CM

## 2024-08-29 DIAGNOSIS — J45.40 MODERATE PERSISTENT ASTHMA, UNSPECIFIED WHETHER COMPLICATED: ICD-10-CM

## 2024-08-29 DIAGNOSIS — R80.8 OTHER PROTEINURIA: ICD-10-CM

## 2024-08-29 DIAGNOSIS — Z91.018 ALLERGIC TO NUTS (OTHER THAN PEANUTS): ICD-10-CM

## 2024-08-29 DIAGNOSIS — N02.2 MEMBRANOUS NEPHROPATHY DETERMINED BY BIOPSY: ICD-10-CM

## 2024-08-29 PROCEDURE — 99214 OFFICE O/P EST MOD 30 MIN: CPT | Performed by: FAMILY MEDICINE

## 2024-08-29 RX ORDER — EPINEPHRINE 0.3 MG/.3ML
0.3 INJECTION SUBCUTANEOUS ONCE
Qty: 0.6 ML | Refills: 0 | Status: SHIPPED | OUTPATIENT
Start: 2024-08-29 | End: 2024-08-29

## 2024-08-29 RX ORDER — MOMETASONE FUROATE AND FORMOTEROL FUMARATE DIHYDRATE 200; 5 UG/1; UG/1
2 AEROSOL RESPIRATORY (INHALATION) 2 TIMES DAILY
Qty: 13 G | Refills: 5 | Status: SHIPPED | OUTPATIENT
Start: 2024-08-29

## 2024-08-29 NOTE — PROGRESS NOTES
"Ambulatory Visit  Name: Reagan Velez      : 1975      MRN: [id number]  Encounter Provider: Nenita Granados MD  Encounter Date: 2024   Encounter department: [facility] PRIMARY CARE    Assessment & Plan   1. Essential hypertension  2. Membranous nephropathy determined by biopsy  Assessment & Plan:  Sees  nephrology,,gets Rituxan infusions  3. Primary membranous nephropathy with nephrotic syndrome  Assessment & Plan:  Sees  nephrology,,gets Rituxan infusions  4. Other proteinuria  Assessment & Plan:  Sees  nephrology,,gets Rituxan infusions  5. Hyperlipidemia, unspecified hyperlipidemia type  Assessment & Plan:  On zetia  , due for  lipids, is planning on some  picnics  for holiday  Orders:  -     Lipid panel  -     TSH, 3rd generation  -     Comprehensive metabolic panel  6. Moderate persistent asthma, unspecified whether complicated  -     mometasone-formoterol (Dulera) 200-5 MCG/ACT inhaler; Inhale 2 puffs 2 (two) times a day Rinse mouth after use.  7. Allergic to nuts (other than peanuts)  -     EPINEPHrine (EPIPEN) 0.3 mg/0.3 mL SOAJ; Inject 0.3 mL (0.3 mg total) into a muscle once for 1 dose       History of Present Illness     Patient presents with:  Follow-up: Offers no complaints    Has  done rituxan infusions  for  nephrotic  syndrome, taking Zetia  for lipids, due  for  lab, rosa lassiter, no cp, no sob, no edema, no headaches        Review of Systems   Constitutional:  Negative for activity change, appetite change and fatigue.   Respiratory:  Negative for shortness of breath.    Cardiovascular:  Negative for chest pain, palpitations and leg swelling.   Neurological:  Negative for dizziness, light-headedness and headaches.       Objective     /82 (BP Location: Left arm, Patient Position: Sitting, Cuff Size: Standard)   Pulse 86   Temp 98.2 °F (36.8 °C) (Temporal)   Ht 5' 2\" (1.575 m)   Wt 67.6 kg (149 lb)   SpO2 98%   BMI 27.25 kg/m²     Physical Exam  Vitals reviewed. "   Constitutional:       Appearance: Normal appearance.   Cardiovascular:      Rate and Rhythm: Normal rate and regular rhythm.      Pulses: Normal pulses.      Heart sounds: Normal heart sounds.   Pulmonary:      Effort: Pulmonary effort is normal.      Breath sounds: Normal breath sounds.   Musculoskeletal:      Right lower leg: No edema.      Left lower leg: No edema.   Lymphadenopathy:      Cervical: No cervical adenopathy.   Neurological:      Mental Status: He is alert.   Psychiatric:         Mood and Affect: Mood normal.       Administrative Statements

## 2024-09-03 ENCOUNTER — APPOINTMENT (OUTPATIENT)
Dept: LAB | Facility: CLINIC | Age: 49
End: 2024-09-03
Payer: COMMERCIAL

## 2024-09-03 ENCOUNTER — TRANSCRIBE ORDERS (OUTPATIENT)
Dept: LAB | Facility: CLINIC | Age: 49
End: 2024-09-03

## 2024-09-03 DIAGNOSIS — N04.21 PRIMARY MEMBRANOUS NEPHROPATHY WITH NEPHROTIC SYNDROME: ICD-10-CM

## 2024-09-03 DIAGNOSIS — R80.9 PROTEINURIA, UNSPECIFIED TYPE: Primary | ICD-10-CM

## 2024-09-03 DIAGNOSIS — R80.9 PROTEINURIA, UNSPECIFIED TYPE: ICD-10-CM

## 2024-09-03 DIAGNOSIS — Z79.899 ENCOUNTER FOR LONG-TERM (CURRENT) USE OF OTHER MEDICATIONS: ICD-10-CM

## 2024-09-03 LAB
ALBUMIN SERPL BCG-MCNC: 2.7 G/DL (ref 3.5–5)
ALP SERPL-CCNC: 41 U/L (ref 34–104)
ALT SERPL W P-5'-P-CCNC: 19 U/L (ref 7–52)
ANION GAP SERPL CALCULATED.3IONS-SCNC: 3 MMOL/L (ref 4–13)
AST SERPL W P-5'-P-CCNC: 27 U/L (ref 13–39)
BILIRUB SERPL-MCNC: 0.27 MG/DL (ref 0.2–1)
BUN SERPL-MCNC: 28 MG/DL (ref 5–25)
CALCIUM ALBUM COR SERPL-MCNC: 8.6 MG/DL (ref 8.3–10.1)
CALCIUM SERPL-MCNC: 7.6 MG/DL (ref 8.4–10.2)
CHLORIDE SERPL-SCNC: 108 MMOL/L (ref 96–108)
CHOLEST SERPL-MCNC: 182 MG/DL
CO2 SERPL-SCNC: 27 MMOL/L (ref 21–32)
CREAT SERPL-MCNC: 1.55 MG/DL (ref 0.6–1.3)
CREAT UR-MCNC: 108.5 MG/DL
ERYTHROCYTE [DISTWIDTH] IN BLOOD BY AUTOMATED COUNT: 12 % (ref 11.6–15.1)
GFR SERPL CREATININE-BSD FRML MDRD: 52 ML/MIN/1.73SQ M
GLUCOSE P FAST SERPL-MCNC: 85 MG/DL (ref 65–99)
HCT VFR BLD AUTO: 36.4 % (ref 36.5–49.3)
HDLC SERPL-MCNC: 46 MG/DL
HGB BLD-MCNC: 11.7 G/DL (ref 12–17)
LDLC SERPL CALC-MCNC: 77 MG/DL (ref 0–100)
MCH RBC QN AUTO: 28.1 PG (ref 26.8–34.3)
MCHC RBC AUTO-ENTMCNC: 32.1 G/DL (ref 31.4–37.4)
MCV RBC AUTO: 88 FL (ref 82–98)
NONHDLC SERPL-MCNC: 136 MG/DL
PHOSPHATE SERPL-MCNC: 4.3 MG/DL (ref 2.7–4.5)
PLATELET # BLD AUTO: 251 THOUSANDS/UL (ref 149–390)
PMV BLD AUTO: 11.6 FL (ref 8.9–12.7)
POTASSIUM SERPL-SCNC: 4.3 MMOL/L (ref 3.5–5.3)
PROT SERPL-MCNC: 4.8 G/DL (ref 6.4–8.4)
PROT UR-MCNC: 580.2 MG/DL
PROT/CREAT UR: 5.3 MG/G{CREAT} (ref 0–0.1)
RBC # BLD AUTO: 4.16 MILLION/UL (ref 3.88–5.62)
SODIUM SERPL-SCNC: 138 MMOL/L (ref 135–147)
TRIGL SERPL-MCNC: 293 MG/DL
TSH SERPL DL<=0.05 MIU/L-ACNC: 2.61 UIU/ML (ref 0.45–4.5)
WBC # BLD AUTO: 4.76 THOUSAND/UL (ref 4.31–10.16)

## 2024-09-03 PROCEDURE — 82570 ASSAY OF URINE CREATININE: CPT

## 2024-09-03 PROCEDURE — 84156 ASSAY OF PROTEIN URINE: CPT

## 2024-09-03 PROCEDURE — 80053 COMPREHEN METABOLIC PANEL: CPT | Performed by: FAMILY MEDICINE

## 2024-09-03 PROCEDURE — 85027 COMPLETE CBC AUTOMATED: CPT

## 2024-09-03 PROCEDURE — 84100 ASSAY OF PHOSPHORUS: CPT

## 2024-09-03 PROCEDURE — 83516 IMMUNOASSAY NONANTIBODY: CPT

## 2024-09-03 PROCEDURE — 84443 ASSAY THYROID STIM HORMONE: CPT | Performed by: FAMILY MEDICINE

## 2024-09-03 PROCEDURE — 80061 LIPID PANEL: CPT | Performed by: FAMILY MEDICINE

## 2024-09-03 PROCEDURE — 36415 COLL VENOUS BLD VENIPUNCTURE: CPT | Performed by: FAMILY MEDICINE

## 2024-09-05 LAB — PLA2R IGG SER IA-ACNC: 5.3 RU/ML (ref 0–19.9)

## 2024-09-16 ENCOUNTER — APPOINTMENT (OUTPATIENT)
Dept: LAB | Facility: CLINIC | Age: 49
End: 2024-09-16
Payer: COMMERCIAL

## 2024-09-16 ENCOUNTER — TRANSCRIBE ORDERS (OUTPATIENT)
Dept: LAB | Facility: CLINIC | Age: 49
End: 2024-09-16

## 2024-09-16 DIAGNOSIS — Z79.899 ENCOUNTER FOR LONG-TERM (CURRENT) USE OF OTHER MEDICATIONS: ICD-10-CM

## 2024-09-16 DIAGNOSIS — I10 ESSENTIAL HYPERTENSION, MALIGNANT: Primary | ICD-10-CM

## 2024-09-16 DIAGNOSIS — R80.9 PROTEINURIA, UNSPECIFIED TYPE: ICD-10-CM

## 2024-09-16 DIAGNOSIS — N04.21 PRIMARY MEMBRANOUS NEPHROPATHY WITH NEPHROTIC SYNDROME: ICD-10-CM

## 2024-09-16 LAB
ALBUMIN SERPL BCG-MCNC: 2.9 G/DL (ref 3.5–5)
ANION GAP SERPL CALCULATED.3IONS-SCNC: 7 MMOL/L (ref 4–13)
BASOPHILS # BLD AUTO: 0.02 THOUSANDS/ΜL (ref 0–0.1)
BASOPHILS NFR BLD AUTO: 0 % (ref 0–1)
BUN SERPL-MCNC: 22 MG/DL (ref 5–25)
CALCIUM ALBUM COR SERPL-MCNC: 9.1 MG/DL (ref 8.3–10.1)
CALCIUM SERPL-MCNC: 8.2 MG/DL (ref 8.4–10.2)
CHLORIDE SERPL-SCNC: 103 MMOL/L (ref 96–108)
CO2 SERPL-SCNC: 27 MMOL/L (ref 21–32)
CREAT SERPL-MCNC: 1.28 MG/DL (ref 0.6–1.3)
CREAT UR-MCNC: 119.9 MG/DL
EOSINOPHIL # BLD AUTO: 0.24 THOUSAND/ΜL (ref 0–0.61)
EOSINOPHIL NFR BLD AUTO: 5 % (ref 0–6)
ERYTHROCYTE [DISTWIDTH] IN BLOOD BY AUTOMATED COUNT: 12.3 % (ref 11.6–15.1)
GFR SERPL CREATININE-BSD FRML MDRD: 66 ML/MIN/1.73SQ M
GLUCOSE P FAST SERPL-MCNC: 99 MG/DL (ref 65–99)
HCT VFR BLD AUTO: 39.5 % (ref 36.5–49.3)
HGB BLD-MCNC: 12.7 G/DL (ref 12–17)
IMM GRANULOCYTES # BLD AUTO: 0.01 THOUSAND/UL (ref 0–0.2)
IMM GRANULOCYTES NFR BLD AUTO: 0 % (ref 0–2)
LYMPHOCYTES # BLD AUTO: 1.6 THOUSANDS/ΜL (ref 0.6–4.47)
LYMPHOCYTES NFR BLD AUTO: 31 % (ref 14–44)
MCH RBC QN AUTO: 28.7 PG (ref 26.8–34.3)
MCHC RBC AUTO-ENTMCNC: 32.2 G/DL (ref 31.4–37.4)
MCV RBC AUTO: 89 FL (ref 82–98)
MONOCYTES # BLD AUTO: 0.49 THOUSAND/ΜL (ref 0.17–1.22)
MONOCYTES NFR BLD AUTO: 10 % (ref 4–12)
NEUTROPHILS # BLD AUTO: 2.76 THOUSANDS/ΜL (ref 1.85–7.62)
NEUTS SEG NFR BLD AUTO: 54 % (ref 43–75)
NRBC BLD AUTO-RTO: 0 /100 WBCS
PHOSPHATE SERPL-MCNC: 3.6 MG/DL (ref 2.7–4.5)
PLATELET # BLD AUTO: 221 THOUSANDS/UL (ref 149–390)
PMV BLD AUTO: 11.6 FL (ref 8.9–12.7)
POTASSIUM SERPL-SCNC: 3.6 MMOL/L (ref 3.5–5.3)
PROT UR-MCNC: 371.1 MG/DL
PROT/CREAT UR: 3.1 MG/G{CREAT} (ref 0–0.1)
RBC # BLD AUTO: 4.43 MILLION/UL (ref 3.88–5.62)
SODIUM SERPL-SCNC: 137 MMOL/L (ref 135–147)
WBC # BLD AUTO: 5.12 THOUSAND/UL (ref 4.31–10.16)

## 2024-09-16 PROCEDURE — 80069 RENAL FUNCTION PANEL: CPT

## 2024-09-16 PROCEDURE — 82570 ASSAY OF URINE CREATININE: CPT

## 2024-09-16 PROCEDURE — 36415 COLL VENOUS BLD VENIPUNCTURE: CPT

## 2024-09-16 PROCEDURE — 85025 COMPLETE CBC W/AUTO DIFF WBC: CPT

## 2024-09-16 PROCEDURE — 84156 ASSAY OF PROTEIN URINE: CPT

## 2024-09-16 PROCEDURE — 83516 IMMUNOASSAY NONANTIBODY: CPT

## 2024-09-18 LAB — PLA2R IGG SER IA-ACNC: 2.1 RU/ML (ref 0–19.9)

## 2024-10-02 ENCOUNTER — APPOINTMENT (OUTPATIENT)
Dept: LAB | Facility: CLINIC | Age: 49
End: 2024-10-02
Payer: COMMERCIAL

## 2024-10-02 DIAGNOSIS — I10 ESSENTIAL HYPERTENSION, MALIGNANT: ICD-10-CM

## 2024-10-02 LAB
ANION GAP SERPL CALCULATED.3IONS-SCNC: 7 MMOL/L (ref 4–13)
BUN SERPL-MCNC: 23 MG/DL (ref 5–25)
CALCIUM SERPL-MCNC: 8.2 MG/DL (ref 8.4–10.2)
CHLORIDE SERPL-SCNC: 110 MMOL/L (ref 96–108)
CO2 SERPL-SCNC: 24 MMOL/L (ref 21–32)
CREAT SERPL-MCNC: 1.32 MG/DL (ref 0.6–1.3)
GFR SERPL CREATININE-BSD FRML MDRD: 63 ML/MIN/1.73SQ M
GLUCOSE P FAST SERPL-MCNC: 90 MG/DL (ref 65–99)
POTASSIUM SERPL-SCNC: 4 MMOL/L (ref 3.5–5.3)
SODIUM SERPL-SCNC: 141 MMOL/L (ref 135–147)

## 2024-10-02 PROCEDURE — 80048 BASIC METABOLIC PNL TOTAL CA: CPT

## 2024-10-02 PROCEDURE — 36415 COLL VENOUS BLD VENIPUNCTURE: CPT

## 2024-10-30 ENCOUNTER — TELEMEDICINE (OUTPATIENT)
Dept: FAMILY MEDICINE CLINIC | Facility: CLINIC | Age: 49
End: 2024-10-30
Payer: COMMERCIAL

## 2024-10-30 VITALS — TEMPERATURE: 99.8 F

## 2024-10-30 DIAGNOSIS — J01.00 ACUTE MAXILLARY SINUSITIS, RECURRENCE NOT SPECIFIED: Primary | ICD-10-CM

## 2024-10-30 PROCEDURE — 99213 OFFICE O/P EST LOW 20 MIN: CPT | Performed by: FAMILY MEDICINE

## 2024-10-30 RX ORDER — AMOXICILLIN 400 MG/5ML
POWDER, FOR SUSPENSION ORAL
Qty: 150 ML | Refills: 0 | Status: SHIPPED | OUTPATIENT
Start: 2024-10-30 | End: 2024-11-08

## 2024-10-30 NOTE — PROGRESS NOTES
Virtual Regular Visit  Name: Reagan Velez      : 1975      MRN: [id number]  Encounter Provider: Nenita Granados MD  Encounter Date: 10/30/2024   Encounter department: [facility] PRIMARY CARE    Verification of patient location:    Patient is located at Home in the following state in which I hold an active license [place]    Assessment & Plan  Acute maxillary sinusitis, recurrence not specified    Orders:    amoxicillin (AMOXIL) 400 MG/5ML suspension; 1  1/2  teaspoons  bid  for  10 days         Encounter provider Nenita Granados MD    The patient was identified by name and date of birth. Reagan Velez was informed that this is a telemedicine visit and that the visit is being conducted through the Epic Embedded platform. He agrees to proceed..  My office door was closed. No one else was in the room.  He acknowledged consent and understanding of privacy and security of the video platform. The patient has agreed to participate and understands they can discontinue the visit at any time.    Patient is aware this is a billable service.     History of Present Illness     Uri  sx  for  a week, now  with headache fever, facial pressure and thick yellow  green nasal discharge, several co-workers sick          Review of Systems   Constitutional:  Positive for chills, fatigue and fever. Negative for activity change and appetite change.   HENT:  Positive for congestion, postnasal drip, rhinorrhea, sinus pressure and sinus pain. Negative for ear pain and sore throat.    Respiratory:  Positive for cough.    Neurological:  Positive for headaches. Negative for dizziness and light-headedness.           Objective     Temp 99.8 °F (37.7 °C)   Physical Exam  Vitals reviewed.   Constitutional:       General: He is not in acute distress.     Appearance: Normal appearance. He is ill-appearing. He is not toxic-appearing.   HENT:      Nose: Congestion present. No rhinorrhea.      Right Sinus: Maxillary sinus tenderness present.       Left Sinus: Maxillary sinus tenderness present.   Eyes:      General:         Right eye: No discharge.         Left eye: No discharge.   Pulmonary:      Effort: Pulmonary effort is normal. No respiratory distress.   Lymphadenopathy:      Cervical: No cervical adenopathy.   Neurological:      Mental Status: He is alert.   Psychiatric:         Mood and Affect: Mood normal.         Visit Time  Total Visit Duration: 6

## 2024-11-25 ENCOUNTER — OFFICE VISIT (OUTPATIENT)
Dept: FAMILY MEDICINE CLINIC | Facility: CLINIC | Age: 49
End: 2024-11-25
Payer: COMMERCIAL

## 2024-11-25 VITALS
BODY MASS INDEX: 27.23 KG/M2 | HEIGHT: 62 IN | TEMPERATURE: 97.2 F | OXYGEN SATURATION: 99 % | SYSTOLIC BLOOD PRESSURE: 138 MMHG | WEIGHT: 148 LBS | HEART RATE: 75 BPM | DIASTOLIC BLOOD PRESSURE: 80 MMHG

## 2024-11-25 DIAGNOSIS — J01.41 ACUTE RECURRENT PANSINUSITIS: ICD-10-CM

## 2024-11-25 DIAGNOSIS — H69.93 DYSFUNCTION OF BOTH EUSTACHIAN TUBES: ICD-10-CM

## 2024-11-25 DIAGNOSIS — I10 ESSENTIAL HYPERTENSION: ICD-10-CM

## 2024-11-25 DIAGNOSIS — L04.0 ACUTE CERVICAL ADENITIS: ICD-10-CM

## 2024-11-25 DIAGNOSIS — J30.9 ALLERGIC RHINITIS, UNSPECIFIED SEASONALITY, UNSPECIFIED TRIGGER: ICD-10-CM

## 2024-11-25 DIAGNOSIS — J45.40 MODERATE PERSISTENT ASTHMA WITHOUT COMPLICATION: ICD-10-CM

## 2024-11-25 DIAGNOSIS — R09.81 NASAL CONGESTION: Primary | ICD-10-CM

## 2024-11-25 LAB
SARS-COV-2 AG UPPER RESP QL IA: NEGATIVE
VALID CONTROL: NORMAL

## 2024-11-25 PROCEDURE — 99214 OFFICE O/P EST MOD 30 MIN: CPT | Performed by: FAMILY MEDICINE

## 2024-11-25 PROCEDURE — 87811 SARS-COV-2 COVID19 W/OPTIC: CPT | Performed by: FAMILY MEDICINE

## 2024-11-25 RX ORDER — AZELASTINE 1 MG/ML
2 SPRAY, METERED NASAL 2 TIMES DAILY
Qty: 30 ML | Refills: 3 | Status: SHIPPED | OUTPATIENT
Start: 2024-11-25

## 2024-11-25 RX ORDER — AZITHROMYCIN 200 MG/5ML
POWDER, FOR SUSPENSION ORAL
Qty: 37.7 ML | Refills: 0 | Status: SHIPPED | OUTPATIENT
Start: 2024-11-25 | End: 2024-11-30

## 2024-11-25 NOTE — PATIENT INSTRUCTIONS
Finish Zithromax 12.5 mL today then 6.3 mL daily for the next 4 days  Use Astelin spray 2 sprays both nostrils twice daily  Return in 1 week if still with symptoms

## 2024-11-25 NOTE — PROGRESS NOTES
Name: Reagan Velez      : 1975      MRN: [id number]  Encounter Provider: Chetan Loving DO  Encounter Date: 2024   Encounter department: [facility] 1.  Nasal congestion, COVID-negative  2.  Acute sinusitis question recurrent  3.  PRID acute cervical adenitis  Zithromax suspension ordered, patient unable to take tablets  4.  Allergic rhinitis  5.  Eustachian tube dysfunction  Astelin nasal spray ordered #6.  Hypertension, stable continue present therapy because of this no decongestant  7.  Asthma, stable lungs are clear  May.  Return in 1 week if still with symptoms  :  Assessment & Plan  Nasal congestion  COVID-negative  Orders:    POCT Rapid Covid Ag    Acute recurrent pansinusitis  Zithromax suspension ordered patient unable to take tablets  Orders:    azelastine (ASTELIN) 0.1 % nasal spray; 2 sprays into each nostril 2 (two) times a day Use in each nostril as directed    Acute cervical adenitis  Zithromax as above  Orders:    azelastine (ASTELIN) 0.1 % nasal spray; 2 sprays into each nostril 2 (two) times a day Use in each nostril as directed    Allergic rhinitis, unspecified seasonality, unspecified trigger  Astelin nasal spray ordered  Orders:    azithromycin (ZITHROMAX) 200 mg/5 mL suspension; Take 12.5 mL (500 mg total) by mouth daily for 1 day, THEN 6.3 mL (250 mg total) daily for 4 days.    Dysfunction of both eustachian tubes  Nasal spray ordered  Orders:    azithromycin (ZITHROMAX) 200 mg/5 mL suspension; Take 12.5 mL (500 mg total) by mouth daily for 1 day, THEN 6.3 mL (250 mg total) daily for 4 days.    Essential hypertension  Stable continue present therapy because of this no decongestant         Moderate persistent asthma without complication  Stable, lungs are clear                History of Present Illness     Approximately 2 weeks ago patient was treated with amoxicillin for a sinus infection.  He felt well until this past Thursday, 2024 sinus pain and  "pressure restarted.  Patient did do a COVID test on Friday which was negative.  No medication has been used since that time      Review of Systems   Constitutional:  Negative for chills and fever.   HENT:          HPI   Eyes: Negative.    Respiratory: Negative.     Cardiovascular: Negative.    Gastrointestinal: Negative.    Endocrine: Negative.    Genitourinary: Negative.    Musculoskeletal: Negative.    Skin: Negative.    Allergic/Immunologic: Positive for environmental allergies.   Neurological: Negative.    Hematological: Negative.    Psychiatric/Behavioral: Negative.            Objective   /80 (BP Location: Right arm, Patient Position: Sitting, Cuff Size: Standard)   Pulse 75   Temp (!) 97.2 °F (36.2 °C) (Tympanic)   Ht 5' 2\" (1.575 m)   Wt 67.1 kg (148 lb)   SpO2 99%   BMI 27.07 kg/m²      Physical Exam  Vitals and nursing note reviewed.   Constitutional:       Appearance: Normal appearance.   HENT:      Head: Normocephalic and atraumatic.      Ears:      Comments: Tympanic membranes are dull bilaterally negative effusion negative injection     Nose:      Comments: Positive allergic turbinate positive pansinus tenderness to percussion     Mouth/Throat:      Comments: Purulent postnasal drip minimal pharyngeal injection negative exudate  Eyes:      General: No scleral icterus.     Conjunctiva/sclera: Conjunctivae normal.   Neck:      Comments: Positive shotty anterior cervical adenopathy  Cardiovascular:      Rate and Rhythm: Normal rate and regular rhythm.      Heart sounds: Normal heart sounds.   Pulmonary:      Effort: Pulmonary effort is normal.      Breath sounds: Normal breath sounds.   Musculoskeletal:      Cervical back: Neck supple. Tenderness present. No rigidity.   Lymphadenopathy:      Cervical: Cervical adenopathy present.   Skin:     General: Skin is warm and dry.   Neurological:      General: No focal deficit present.      Mental Status: He is alert and oriented to person, place, and " time.   Psychiatric:         Mood and Affect: Mood normal.

## 2024-12-11 ENCOUNTER — APPOINTMENT (OUTPATIENT)
Dept: LAB | Facility: CLINIC | Age: 49
End: 2024-12-11
Payer: COMMERCIAL

## 2024-12-11 ENCOUNTER — TRANSCRIBE ORDERS (OUTPATIENT)
Dept: LAB | Facility: CLINIC | Age: 49
End: 2024-12-11

## 2024-12-11 DIAGNOSIS — N04.21 PRIMARY MEMBRANOUS NEPHROPATHY WITH NEPHROTIC SYNDROME: Primary | ICD-10-CM

## 2024-12-11 DIAGNOSIS — N04.21 PRIMARY MEMBRANOUS NEPHROPATHY WITH NEPHROTIC SYNDROME: ICD-10-CM

## 2024-12-11 LAB
ALBUMIN SERPL BCG-MCNC: 3.4 G/DL (ref 3.5–5)
ANION GAP SERPL CALCULATED.3IONS-SCNC: 6 MMOL/L (ref 4–13)
BUN SERPL-MCNC: 23 MG/DL (ref 5–25)
CALCIUM ALBUM COR SERPL-MCNC: 9.3 MG/DL (ref 8.3–10.1)
CALCIUM SERPL-MCNC: 8.8 MG/DL (ref 8.4–10.2)
CHLORIDE SERPL-SCNC: 105 MMOL/L (ref 96–108)
CO2 SERPL-SCNC: 26 MMOL/L (ref 21–32)
CREAT SERPL-MCNC: 1.41 MG/DL (ref 0.6–1.3)
CREAT UR-MCNC: 128.6 MG/DL
GFR SERPL CREATININE-BSD FRML MDRD: 58 ML/MIN/1.73SQ M
GLUCOSE P FAST SERPL-MCNC: 79 MG/DL (ref 65–99)
PHOSPHATE SERPL-MCNC: 3.7 MG/DL (ref 2.7–4.5)
POTASSIUM SERPL-SCNC: 3.9 MMOL/L (ref 3.5–5.3)
PROT UR-MCNC: 296.2 MG/DL
PROT/CREAT UR: 2.3 MG/G{CREAT} (ref 0–0.1)
SODIUM SERPL-SCNC: 137 MMOL/L (ref 135–147)

## 2024-12-11 PROCEDURE — 82570 ASSAY OF URINE CREATININE: CPT

## 2024-12-11 PROCEDURE — 36415 COLL VENOUS BLD VENIPUNCTURE: CPT

## 2024-12-11 PROCEDURE — 80069 RENAL FUNCTION PANEL: CPT

## 2024-12-11 PROCEDURE — 84156 ASSAY OF PROTEIN URINE: CPT

## 2024-12-11 PROCEDURE — 83516 IMMUNOASSAY NONANTIBODY: CPT

## 2024-12-13 LAB — PLA2R IGG SER IA-ACNC: <1.8 RU/ML (ref 0–19.9)

## 2025-01-06 ENCOUNTER — OFFICE VISIT (OUTPATIENT)
Dept: FAMILY MEDICINE CLINIC | Facility: CLINIC | Age: 50
End: 2025-01-06
Payer: COMMERCIAL

## 2025-01-06 VITALS
OXYGEN SATURATION: 97 % | DIASTOLIC BLOOD PRESSURE: 80 MMHG | HEIGHT: 62 IN | WEIGHT: 145.8 LBS | BODY MASS INDEX: 26.83 KG/M2 | SYSTOLIC BLOOD PRESSURE: 126 MMHG | HEART RATE: 82 BPM

## 2025-01-06 DIAGNOSIS — J45.40 MODERATE PERSISTENT ASTHMA, UNSPECIFIED WHETHER COMPLICATED: ICD-10-CM

## 2025-01-06 DIAGNOSIS — J45.40 MODERATE PERSISTENT ASTHMA WITHOUT COMPLICATION: ICD-10-CM

## 2025-01-06 DIAGNOSIS — Z91.018 ALLERGIC TO NUTS (OTHER THAN PEANUTS): ICD-10-CM

## 2025-01-06 DIAGNOSIS — Z00.00 WELL ADULT EXAM: Primary | ICD-10-CM

## 2025-01-06 DIAGNOSIS — E78.5 HYPERLIPIDEMIA, UNSPECIFIED HYPERLIPIDEMIA TYPE: ICD-10-CM

## 2025-01-06 DIAGNOSIS — Z12.5 SCREENING FOR PROSTATE CANCER: ICD-10-CM

## 2025-01-06 DIAGNOSIS — E77.8 HYPOPROTEINEMIA (HCC): ICD-10-CM

## 2025-01-06 PROCEDURE — 99396 PREV VISIT EST AGE 40-64: CPT | Performed by: FAMILY MEDICINE

## 2025-01-06 PROCEDURE — 99214 OFFICE O/P EST MOD 30 MIN: CPT | Performed by: FAMILY MEDICINE

## 2025-01-06 RX ORDER — EPINEPHRINE 0.3 MG/.3ML
0.3 INJECTION SUBCUTANEOUS ONCE
Qty: 0.6 ML | Refills: 0 | Status: SHIPPED | OUTPATIENT
Start: 2025-01-06 | End: 2025-01-06

## 2025-01-06 RX ORDER — ALBUTEROL SULFATE 90 UG/1
2 INHALANT RESPIRATORY (INHALATION) EVERY 6 HOURS PRN
Qty: 6.7 G | Refills: 1 | Status: SHIPPED | OUTPATIENT
Start: 2025-01-06

## 2025-01-06 RX ORDER — MOMETASONE FUROATE AND FORMOTEROL FUMARATE DIHYDRATE 200; 5 UG/1; UG/1
2 AEROSOL RESPIRATORY (INHALATION) 2 TIMES DAILY
Qty: 13 G | Refills: 5 | Status: SHIPPED | OUTPATIENT
Start: 2025-01-06

## 2025-01-06 NOTE — PROGRESS NOTES
"Assessment & Plan     Healthy male exam.      1.   2. Patient Counseling:  --Nutrition: Stressed importance of moderation in sodium/caffeine intake, saturated fat and cholesterol, caloric balance, sufficient intake of fresh fruits, vegetables, fiber, calcium    --Exercise: Stressed the importance of regular exercise.   --Substance Abuse:no concerns.    --Sexuality: no concerns, bc condoms   --Injury prevention: Discussed safety belts, safety helmets, smoke detector,  --Dental health: Discussed importance of regular tooth brushing, flossing, and dental visits.  --Immunizations reviewed.  --Discussed benefits of screening colonoscopy.-had  done  2022. Recall for  2029  --After hours service discussed with patient  Baseline  PSA  2.-due  f/u  2025  3. Discussed the patient's BMI with him.  The BMI is above average; BMI management plan is completed  4. Follow up  6 months .    Subjective     Reagan Velez is a 49 y.o. male and is here for a comprehensive physical exam. The patient reports no problems.    Do you take any herbs or supplements that were not prescribed by a doctor? no  Are you taking calcium supplements? no  Are you taking aspirin daily? no     History:  Patient receives prostate care outside our clinic  Date last prostate exam:  none  Date last PSA: April 5, 2023  Any STD's in the past? none        Review of Systems  Do you have pain that bothers you in your daily life? no  Ros  negative .    Objective     /80 (BP Location: Right arm, Patient Position: Sitting, Cuff Size: Standard)   Pulse 82   Ht 5' 2\" (1.575 m)   Wt 66.1 kg (145 lb 12.8 oz)   SpO2 97%   BMI 26.67 kg/m²     General Appearance:    Alert, cooperative, no distress, appears stated age   Head:    Normocephalic, without obvious abnormality, atraumatic   Eyes:    PERRL, conjunctiva/corneas clear, EOM's intact, fundi     benign, both eyes        Ears:    Normal TM's and external ear canals, both ears   Nose:   Nares normal, septum " "midline, mucosa normal, no drainage    or sinus tenderness   Throat:   Lips, mucosa, and tongue normal; teeth and gums normal   Neck:   Supple, symmetrical, trachea midline, no adenopathy;        thyroid:  No enlargement/tenderness/nodules; no carotid    bruit or JVD   Back:     Symmetric, no curvature, ROM normal, no CVA tenderness   Lungs:     Clear to auscultation bilaterally, respirations unlabored   Chest wall:    No tenderness or deformity   Heart:    Regular rate and rhythm, S1 and S2 normal, no murmur, rub   or gallop   Abdomen:     Soft, non-tender, bowel sounds active all four quadrants,     no masses, no organomegaly           Extremities:   Extremities normal, atraumatic, no cyanosis or edema   Pulses:   2+ and symmetric all extremities   Skin:   Skin color, texture, turgor normal, no rashes or lesions   Lymph nodes:   Cervical, supraclavicular, and axillary nodes normal   Neurologic:   CNII-XII intact. Normal strength, sensation and reflexes       throughout   .no immunizations  needed, declines  most  recent  covid  booster    Name: Reagan Velez      : 1975      MRN: [id number]  Encounter Provider: Nenita Granados MD  Encounter Date: 2025   Encounter department: [facility]  :  Assessment & Plan  Hypoproteinemia (HCC)  Sees  nephrology,  protein in urine went  from 6  to 2       Moderate persistent asthma without complication       stable  at present, no flare  ups  Well adult exam                History of Present Illness     HPI  Review of Systems    Objective   /80 (BP Location: Right arm, Patient Position: Sitting, Cuff Size: Standard)   Pulse 82   Ht 5' 2\" (1.575 m)   Wt 66.1 kg (145 lb 12.8 oz)   SpO2 97%   BMI 26.67 kg/m²      Physical Exam    "

## 2025-02-15 DIAGNOSIS — E78.5 HYPERLIPIDEMIA, UNSPECIFIED HYPERLIPIDEMIA TYPE: ICD-10-CM

## 2025-02-16 RX ORDER — EZETIMIBE 10 MG/1
10 TABLET ORAL DAILY
Qty: 90 TABLET | Refills: 0 | Status: SHIPPED | OUTPATIENT
Start: 2025-02-16

## 2025-04-08 ENCOUNTER — APPOINTMENT (OUTPATIENT)
Dept: LAB | Facility: CLINIC | Age: 50
End: 2025-04-08
Payer: COMMERCIAL

## 2025-04-08 DIAGNOSIS — Z12.5 SCREENING FOR PROSTATE CANCER: ICD-10-CM

## 2025-04-08 DIAGNOSIS — E78.5 HYPERLIPIDEMIA, UNSPECIFIED HYPERLIPIDEMIA TYPE: ICD-10-CM

## 2025-04-08 LAB
CHOLEST SERPL-MCNC: 136 MG/DL (ref ?–200)
HDLC SERPL-MCNC: 41 MG/DL
LDLC SERPL CALC-MCNC: 59 MG/DL (ref 0–100)
NONHDLC SERPL-MCNC: 95 MG/DL
TRIGL SERPL-MCNC: 180 MG/DL (ref ?–150)

## 2025-04-08 PROCEDURE — 36415 COLL VENOUS BLD VENIPUNCTURE: CPT

## 2025-04-08 PROCEDURE — 80061 LIPID PANEL: CPT

## 2025-04-08 PROCEDURE — 84443 ASSAY THYROID STIM HORMONE: CPT

## 2025-04-08 PROCEDURE — G0103 PSA SCREENING: HCPCS

## 2025-04-09 ENCOUNTER — RESULTS FOLLOW-UP (OUTPATIENT)
Dept: FAMILY MEDICINE CLINIC | Facility: CLINIC | Age: 50
End: 2025-04-09

## 2025-04-09 DIAGNOSIS — R97.20 INCREASED PROSTATE SPECIFIC ANTIGEN (PSA) VELOCITY: Primary | ICD-10-CM

## 2025-04-09 LAB
PSA SERPL-MCNC: 3.76 NG/ML (ref 0–4)
TSH SERPL DL<=0.05 MIU/L-ACNC: 1.58 UIU/ML (ref 0.45–4.5)

## 2025-04-11 ENCOUNTER — TELEPHONE (OUTPATIENT)
Age: 50
End: 2025-04-11

## 2025-04-11 NOTE — TELEPHONE ENCOUNTER
New Patient      Insurance   Current Insurance? Yes    Insurance E-verified? Yes      History   Reason for appointment/active symptoms?  Elevated PSA      Has the patient had any previous Urologist(s)? No      Was the patient seen in the ED? No      Labs/Imaging(Including Out Of Network)?     4/8/25 PSA done resulting at 3.759     Records Requested? No   Records Visible in EPIC? Yes      Personal history of cancer? No      Appointment   Office location preference:  [place]   ?   Appointment Details   Date:  4/29/25  Time:  10:00am   Location:  [place]   Provider:  Rich   Does the appointment need further review? No

## 2025-04-29 ENCOUNTER — CONSULT (OUTPATIENT)
Dept: UROLOGY | Facility: AMBULATORY SURGERY CENTER | Age: 50
End: 2025-04-29
Attending: FAMILY MEDICINE
Payer: COMMERCIAL

## 2025-04-29 VITALS
BODY MASS INDEX: 26.68 KG/M2 | WEIGHT: 145 LBS | HEART RATE: 66 BPM | HEIGHT: 62 IN | SYSTOLIC BLOOD PRESSURE: 138 MMHG | OXYGEN SATURATION: 99 % | DIASTOLIC BLOOD PRESSURE: 84 MMHG

## 2025-04-29 DIAGNOSIS — R97.20 INCREASED PROSTATE SPECIFIC ANTIGEN (PSA) VELOCITY: ICD-10-CM

## 2025-04-29 PROCEDURE — 99204 OFFICE O/P NEW MOD 45 MIN: CPT

## 2025-04-29 NOTE — ASSESSMENT & PLAN NOTE
Patient denies a known family history for prostate cancer  Patient's last PSA was performed 4/8/2025 and found to be 3.759.  Refer to PSA trend below.  LESLEE performed today.  Palpable benign prostate.  Refer to physical exam findings.  We discussed that the patient's PSA did increase a whole point over a year, which we get concerned about for potential prostate cancer.  We discussed that we should initially repeat the PSA to determine if this is a transient elevation versus a persistent rise.  Additionally, we discussed that further workup for an elevated PSA would include a multiparametric MRI of the prostate to look for any suspicious lesions for prostate cancer.  Patient will undergo repeat PSA testing in the next 2 to 4 weeks and our office will call with results.  If the patient's PSA were to increase further then my recommendation would be to go for an a multiparametric MRI of the prostate.  If the patient's PSA were to decrease near his baseline then we can plan to continue to monitor the PSA.  Our office will call with PSA results.    Lab Results   Component Value Date    PSA 3.759 04/08/2025    PSA 2.75 04/24/2024    PSA 2.46 11/01/2023

## 2025-04-29 NOTE — PROGRESS NOTES
4/29/2025      Assessment and Plan    49 y.o. male new patient to [facility] for urology    Increased prostate specific antigen (PSA) velocity  Patient denies a known family history for prostate cancer  Patient's last PSA was performed 4/8/2025 and found to be 3.759.  Refer to PSA trend below.  LELSEE performed today.  Palpable benign prostate.  Refer to physical exam findings.  We discussed that the patient's PSA did increase a whole point over a year, which we get concerned about for potential prostate cancer.  We discussed that we should initially repeat the PSA to determine if this is a transient elevation versus a persistent rise.  Additionally, we discussed that further workup for an elevated PSA would include a multiparametric MRI of the prostate to look for any suspicious lesions for prostate cancer.  Patient will undergo repeat PSA testing in the next 2 to 4 weeks and our office will call with results.  If the patient's PSA were to increase further then my recommendation would be to go for an a multiparametric MRI of the prostate.  If the patient's PSA were to decrease near his baseline then we can plan to continue to monitor the PSA.  Our office will call with PSA results.    Lab Results   Component Value Date    PSA 3.759 04/08/2025    PSA 2.75 04/24/2024    PSA 2.46 11/01/2023            History of Present Illness  Reagan Velez is a 49 y.o. male here for evaluation of elevated PSA.  Patient has a significant past medical history for hypertension, hyperlipidemia, and chronic bilateral low back pain.  Patient was referred to our practice by his PCP after undergoing routine PSA testing and was noted to have an increased PSA velocity.  Patient's last PSA was performed 4/8/2025 and found to be 3.759.  Patient's PSA is noted to have an increased PSA velocity going from 2.75 a year ago to 3.759.  Patient does not currently utilize any pharmacotherapy for treatment of lower urinary tract symptoms.    Today,  the patient denies any known family history for prostate cancer.  Patient reports that he is currently content with his voiding pattern.  Patient denies a weakened urinary stream, worsening urinary frequency/urgency, urinary hesitancy, feelings of incomplete bladder emptying, dysuria, hematuria, or flank pain.  Patient offers no lower urinary tract complaints today's office visit.        Review of Systems   Constitutional:  Negative for chills and fever.   HENT:  Negative for ear pain and sore throat.    Eyes:  Negative for pain and visual disturbance.   Respiratory:  Negative for cough and shortness of breath.    Cardiovascular:  Negative for chest pain and palpitations.   Gastrointestinal:  Negative for abdominal pain and vomiting.   Genitourinary:  Negative for decreased urine volume, difficulty urinating, dysuria, flank pain, frequency, hematuria and urgency.   Musculoskeletal:  Negative for arthralgias and back pain.   Skin:  Negative for color change and rash.   Neurological:  Negative for seizures and syncope.   All other systems reviewed and are negative.          AUA SYMPTOM SCORE      Flowsheet Row Most Recent Value   AUA SYMPTOM SCORE    How often have you had a sensation of not emptying your bladder completely after you finished urinating? 2 (P)     How often have you had to urinate again less than two hours after you finished urinating? 1 (P)     How often have you found you stopped and started again several times when you urinate? 1 (P)     How often have you found it difficult to postpone urination? 1 (P)     How often have you had a weak urinary stream? 1 (P)     How often have you had to push or strain to begin urination? 1 (P)     How many times did you most typically get up to urinate from the time you went to bed at night until the time you got up in the morning? 1 (P)     Quality of Life: If you were to spend the rest of your life with your urinary condition just the way it is now, how would you  "feel about that? 2 (P)     AUA SYMPTOM SCORE 8 (P)               Vitals  Vitals:    04/29/25 0952   BP: 138/84   Patient Position: Sitting   Cuff Size: Adult   Pulse: 66   SpO2: 99%   Weight: 65.8 kg (145 lb)   Height: 5' 2\" (1.575 m)       Physical Exam  Vitals reviewed.   Constitutional:       General: He is not in acute distress.     Appearance: Normal appearance. He is not ill-appearing.   HENT:      Head: Normocephalic and atraumatic.      Nose: Nose normal.   Eyes:      General: No scleral icterus.  Pulmonary:      Effort: No respiratory distress.   Abdominal:      General: Abdomen is flat. There is no distension.      Palpations: Abdomen is soft.      Tenderness: There is no abdominal tenderness.   Genitourinary:     Comments: LESLEE performed today.  Prostate estimated to be about 40 g and smooth bilaterally without nodularity or induration.  Musculoskeletal:         General: Normal range of motion.      Cervical back: Normal range of motion.   Skin:     General: Skin is warm.      Coloration: Skin is not jaundiced.   Neurological:      Mental Status: He is alert and oriented to person, place, and time.      Gait: Gait normal.   Psychiatric:         Mood and Affect: Mood normal.         Behavior: Behavior normal.           Past History  Past Medical History:   Diagnosis Date    Acute appendicitis     Allergic     Asthma     Chronic kidney disease     Headache(784.0)     Hypertension     Membranous nephropathy determined by biopsy 04/24/2019    Otitis media     Proteinuria      Social History     Socioeconomic History    Marital status: Single     Spouse name: None    Number of children: None    Years of education: None    Highest education level: None   Occupational History    None   Tobacco Use    Smoking status: Never    Smokeless tobacco: Never   Vaping Use    Vaping status: Never Used   Substance and Sexual Activity    Alcohol use: Not Currently     Comment: Half of a beer 2 months ago.    Drug use: Never "    Sexual activity: Yes     Partners: Female     Birth control/protection: Condom Male, None   Other Topics Concern    None   Social History Narrative    Uses seat belt     [place]     Employed    No children    No living will     Single     Supportive and safe      Social Drivers of Health     Financial Resource Strain: Low Risk  (1/30/2024)    [facility]    Overall Financial Resource Strain (CARDIA)     Difficulty of Paying Living Expenses: Not very hard   Food Insecurity: No Food Insecurity (1/30/2024)    [facility]    Hunger Vital Sign     Worried About Running Out of Food in the Last Year: Never true     Ran Out of Food in the Last Year: Never true   Transportation Needs: No Transportation Needs (1/30/2024)    [facility]    PRAPARE - Transportation     Lack of Transportation (Medical): No     Lack of Transportation (Non-Medical): No   Physical Activity: Not on file   Stress: Not on file   Social Connections: Not on file   Intimate Partner Violence: Not At Risk (1/30/2024)    [facility]    Humiliation, Afraid, Rape, and Kick questionnaire     Fear of Current or Ex-Partner: No     Emotionally Abused: No     Physically Abused: No     Sexually Abused: No   Housing Stability: Low Risk  (1/30/2024)    [facility]    Housing Stability Vital Sign     Unable to Pay for Housing in the Last Year: No     Number of Places Lived in the Last Year: 1     Unstable Housing in the Last Year: No     Social History     Tobacco Use   Smoking Status Never   Smokeless Tobacco Never     Family History   Problem Relation Age of Onset    Asthma Mother     Diabetes Father     Kidney failure Father      Hypertension Father     Diabetes Family     No Known Problems Sister     Ulcers Brother        The following portions of the patient's history were reviewed and updated as appropriate: allergies, current medications, past medical history, past social history, past surgical history and problem list.    Results  No results found for this or any previous visit (from the past hour).]  Lab Results   Component Value Date    PSA 3.759 04/08/2025    PSA 2.75 04/24/2024    PSA 2.46 11/01/2023    PSA 2.2 04/05/2023     Lab Results   Component Value Date    CALCIUM 8.8 12/11/2024     04/07/2017    K 3.9 12/11/2024    CO2 26 12/11/2024     12/11/2024    BUN 23 12/11/2024    CREATININE 1.41 (H) 12/11/2024     Lab Results   Component Value Date    WBC 5.12 09/16/2024    HGB 12.7 09/16/2024    HCT 39.5 09/16/2024    MCV 89 09/16/2024     09/16/2024

## 2025-05-12 ENCOUNTER — TRANSCRIBE ORDERS (OUTPATIENT)
Dept: LAB | Facility: CLINIC | Age: 50
End: 2025-05-12

## 2025-05-12 ENCOUNTER — APPOINTMENT (OUTPATIENT)
Dept: LAB | Facility: CLINIC | Age: 50
End: 2025-05-12
Payer: COMMERCIAL

## 2025-05-12 DIAGNOSIS — N04.21 PRIMARY MEMBRANOUS NEPHROPATHY WITH NEPHROTIC SYNDROME: ICD-10-CM

## 2025-05-12 DIAGNOSIS — R97.20 INCREASED PROSTATE SPECIFIC ANTIGEN (PSA) VELOCITY: ICD-10-CM

## 2025-05-12 DIAGNOSIS — N04.21 PRIMARY MEMBRANOUS NEPHROPATHY WITH NEPHROTIC SYNDROME: Primary | ICD-10-CM

## 2025-05-12 LAB
ALBUMIN SERPL BCG-MCNC: 3.6 G/DL (ref 3.5–5)
ANION GAP SERPL CALCULATED.3IONS-SCNC: 6 MMOL/L (ref 4–13)
BUN SERPL-MCNC: 24 MG/DL (ref 5–25)
CALCIUM SERPL-MCNC: 8.7 MG/DL (ref 8.4–10.2)
CHLORIDE SERPL-SCNC: 106 MMOL/L (ref 96–108)
CO2 SERPL-SCNC: 27 MMOL/L (ref 21–32)
CREAT SERPL-MCNC: 1.51 MG/DL (ref 0.6–1.3)
CREAT UR-MCNC: 124 MG/DL
GFR SERPL CREATININE-BSD FRML MDRD: 54 ML/MIN/1.73SQ M
GLUCOSE P FAST SERPL-MCNC: 98 MG/DL (ref 65–99)
HCT VFR BLD AUTO: 35 % (ref 36.5–49.3)
HGB BLD-MCNC: 11.5 G/DL (ref 12–17)
PHOSPHATE SERPL-MCNC: 3.5 MG/DL (ref 2.7–4.5)
POTASSIUM SERPL-SCNC: 4.1 MMOL/L (ref 3.5–5.3)
PROT UR-MCNC: 192.2 MG/DL
PROT/CREAT UR: 1.6 MG/G{CREAT}
PSA SERPL-MCNC: 3.25 NG/ML (ref 0–4)
SODIUM SERPL-SCNC: 139 MMOL/L (ref 135–147)

## 2025-05-12 PROCEDURE — 82570 ASSAY OF URINE CREATININE: CPT

## 2025-05-12 PROCEDURE — 84153 ASSAY OF PSA TOTAL: CPT

## 2025-05-12 PROCEDURE — 85018 HEMOGLOBIN: CPT

## 2025-05-12 PROCEDURE — 83516 IMMUNOASSAY NONANTIBODY: CPT

## 2025-05-12 PROCEDURE — 84156 ASSAY OF PROTEIN URINE: CPT

## 2025-05-12 PROCEDURE — 36415 COLL VENOUS BLD VENIPUNCTURE: CPT

## 2025-05-12 PROCEDURE — 80069 RENAL FUNCTION PANEL: CPT

## 2025-05-12 PROCEDURE — 85014 HEMATOCRIT: CPT

## 2025-05-13 ENCOUNTER — RESULTS FOLLOW-UP (OUTPATIENT)
Dept: UROLOGY | Facility: AMBULATORY SURGERY CENTER | Age: 50
End: 2025-05-13

## 2025-05-14 LAB — PLA2R IGG SER IA-ACNC: 3.3 RU/ML (ref 0–19.9)

## 2025-05-21 DIAGNOSIS — E78.5 HYPERLIPIDEMIA, UNSPECIFIED HYPERLIPIDEMIA TYPE: ICD-10-CM

## 2025-05-21 RX ORDER — EZETIMIBE 10 MG/1
10 TABLET ORAL DAILY
Qty: 90 TABLET | Refills: 0 | Status: SHIPPED | OUTPATIENT
Start: 2025-05-21

## 2025-07-28 ENCOUNTER — TELEPHONE (OUTPATIENT)
Age: 50
End: 2025-07-28

## 2025-07-29 ENCOUNTER — TELEMEDICINE (OUTPATIENT)
Dept: FAMILY MEDICINE CLINIC | Facility: CLINIC | Age: 50
End: 2025-07-29
Payer: COMMERCIAL

## 2025-07-29 DIAGNOSIS — J45.40 MODERATE PERSISTENT ASTHMA WITHOUT COMPLICATION: ICD-10-CM

## 2025-07-29 DIAGNOSIS — J01.00 ACUTE NON-RECURRENT MAXILLARY SINUSITIS: Primary | ICD-10-CM

## 2025-07-29 PROCEDURE — 99214 OFFICE O/P EST MOD 30 MIN: CPT | Performed by: NURSE PRACTITIONER

## 2025-07-29 RX ORDER — AZITHROMYCIN 200 MG/5ML
POWDER, FOR SUSPENSION ORAL
Qty: 37.7 ML | Refills: 0 | Status: SHIPPED | OUTPATIENT
Start: 2025-07-29 | End: 2025-08-03

## 2025-07-29 RX ORDER — PREDNISOLONE SODIUM PHOSPHATE 15 MG/5ML
15 SOLUTION ORAL DAILY
Qty: 25 ML | Refills: 0 | Status: SHIPPED | OUTPATIENT
Start: 2025-07-29 | End: 2025-08-03

## 2025-08-11 ENCOUNTER — APPOINTMENT (OUTPATIENT)
Dept: LAB | Facility: CLINIC | Age: 50
End: 2025-08-11
Payer: COMMERCIAL

## 2025-08-11 ENCOUNTER — TRANSCRIBE ORDERS (OUTPATIENT)
Dept: LAB | Facility: CLINIC | Age: 50
End: 2025-08-11

## 2025-08-11 ENCOUNTER — APPOINTMENT (OUTPATIENT)
Dept: LAB | Facility: CLINIC | Age: 50
End: 2025-08-11
Attending: INTERNAL MEDICINE
Payer: COMMERCIAL